# Patient Record
Sex: MALE | Race: WHITE | NOT HISPANIC OR LATINO
[De-identification: names, ages, dates, MRNs, and addresses within clinical notes are randomized per-mention and may not be internally consistent; named-entity substitution may affect disease eponyms.]

---

## 2017-01-05 ENCOUNTER — APPOINTMENT (OUTPATIENT)
Dept: FAMILY MEDICINE | Facility: CLINIC | Age: 82
End: 2017-01-05

## 2017-01-05 VITALS
BODY MASS INDEX: 31.84 KG/M2 | TEMPERATURE: 98 F | SYSTOLIC BLOOD PRESSURE: 124 MMHG | DIASTOLIC BLOOD PRESSURE: 60 MMHG | HEIGHT: 69 IN | WEIGHT: 215 LBS | HEART RATE: 72 BPM

## 2017-01-05 DIAGNOSIS — K52.9 NONINFECTIVE GASTROENTERITIS AND COLITIS, UNSPECIFIED: ICD-10-CM

## 2017-02-27 ENCOUNTER — APPOINTMENT (OUTPATIENT)
Dept: FAMILY MEDICINE | Facility: CLINIC | Age: 82
End: 2017-02-27

## 2017-02-27 ENCOUNTER — NON-APPOINTMENT (OUTPATIENT)
Age: 82
End: 2017-02-27

## 2017-02-27 VITALS — HEART RATE: 72 BPM | SYSTOLIC BLOOD PRESSURE: 140 MMHG | RESPIRATION RATE: 16 BRPM | DIASTOLIC BLOOD PRESSURE: 80 MMHG

## 2017-02-27 VITALS
SYSTOLIC BLOOD PRESSURE: 162 MMHG | OXYGEN SATURATION: 95 % | DIASTOLIC BLOOD PRESSURE: 80 MMHG | HEIGHT: 69 IN | HEART RATE: 95 BPM | WEIGHT: 210 LBS | BODY MASS INDEX: 31.1 KG/M2

## 2017-02-28 LAB
ALBUMIN SERPL ELPH-MCNC: 4.8 G/DL
ALP BLD-CCNC: 96 U/L
ALT SERPL-CCNC: 32 U/L
ANION GAP SERPL CALC-SCNC: 15 MMOL/L
APPEARANCE: CLEAR
AST SERPL-CCNC: 21 U/L
BACTERIA: NEGATIVE
BASOPHILS # BLD AUTO: 0.05 K/UL
BASOPHILS NFR BLD AUTO: 1.1 %
BILIRUB SERPL-MCNC: 0.5 MG/DL
BILIRUBIN URINE: NEGATIVE
BLOOD URINE: ABNORMAL
BUN SERPL-MCNC: 23 MG/DL
CALCIUM SERPL-MCNC: 10.4 MG/DL
CHLORIDE SERPL-SCNC: 101 MMOL/L
CHOLEST SERPL-MCNC: 206 MG/DL
CHOLEST/HDLC SERPL: 5 RATIO
CO2 SERPL-SCNC: 26 MMOL/L
COLOR: YELLOW
CREAT SERPL-MCNC: 1.16 MG/DL
CREAT SPEC-SCNC: 31 MG/DL
EOSINOPHIL # BLD AUTO: 0.18 K/UL
EOSINOPHIL NFR BLD AUTO: 3.9 %
GLUCOSE QUALITATIVE U: 100 MG/DL
GLUCOSE SERPL-MCNC: 167 MG/DL
HBA1C MFR BLD HPLC: 7.4 %
HCT VFR BLD CALC: 44.7 %
HDLC SERPL-MCNC: 41 MG/DL
HGB BLD-MCNC: 14.3 G/DL
HYALINE CASTS: 0 /LPF
IMM GRANULOCYTES NFR BLD AUTO: 0.2 %
KETONES URINE: NEGATIVE
LDLC SERPL CALC-MCNC: 104 MG/DL
LEUKOCYTE ESTERASE URINE: NEGATIVE
LYMPHOCYTES # BLD AUTO: 1.45 K/UL
LYMPHOCYTES NFR BLD AUTO: 31 %
MAN DIFF?: NORMAL
MCHC RBC-ENTMCNC: 27.9 PG
MCHC RBC-ENTMCNC: 32 GM/DL
MCV RBC AUTO: 87.1 FL
MICROALBUMIN 24H UR DL<=1MG/L-MCNC: 7.8 MG/DL
MICROALBUMIN/CREAT 24H UR-RTO: 253 UG/MG
MICROSCOPIC-UA: NORMAL
MONOCYTES # BLD AUTO: 0.58 K/UL
MONOCYTES NFR BLD AUTO: 12.4 %
NEUTROPHILS # BLD AUTO: 2.4 K/UL
NEUTROPHILS NFR BLD AUTO: 51.4 %
NITRITE URINE: NEGATIVE
PH URINE: 6
PLATELET # BLD AUTO: 196 K/UL
POTASSIUM SERPL-SCNC: 4.8 MMOL/L
PROT SERPL-MCNC: 8 G/DL
PROTEIN URINE: NEGATIVE MG/DL
RBC # BLD: 5.13 M/UL
RBC # FLD: 13.7 %
RED BLOOD CELLS URINE: 2 /HPF
SODIUM SERPL-SCNC: 142 MMOL/L
SPECIFIC GRAVITY URINE: 1.01
SQUAMOUS EPITHELIAL CELLS: 0 /HPF
T4 SERPL-MCNC: 6.6 UG/DL
TRIGL SERPL-MCNC: 306 MG/DL
TSH SERPL-ACNC: 0.76 UIU/ML
URATE SERPL-MCNC: 4.4 MG/DL
UROBILINOGEN URINE: NORMAL MG/DL
WBC # FLD AUTO: 4.67 K/UL
WHITE BLOOD CELLS URINE: 1 /HPF

## 2017-03-13 ENCOUNTER — APPOINTMENT (OUTPATIENT)
Dept: FAMILY MEDICINE | Facility: CLINIC | Age: 82
End: 2017-03-13

## 2017-03-13 VITALS
DIASTOLIC BLOOD PRESSURE: 60 MMHG | SYSTOLIC BLOOD PRESSURE: 108 MMHG | HEART RATE: 66 BPM | WEIGHT: 210.25 LBS | HEIGHT: 70 IN | BODY MASS INDEX: 30.1 KG/M2 | OXYGEN SATURATION: 93 %

## 2017-03-13 VITALS — RESPIRATION RATE: 16 BRPM | HEART RATE: 72 BPM | SYSTOLIC BLOOD PRESSURE: 120 MMHG | DIASTOLIC BLOOD PRESSURE: 80 MMHG

## 2017-03-13 DIAGNOSIS — M25.512 PAIN IN LEFT SHOULDER: ICD-10-CM

## 2017-03-13 LAB — INR PPP: 4.1 RATIO

## 2017-04-17 ENCOUNTER — APPOINTMENT (OUTPATIENT)
Dept: FAMILY MEDICINE | Facility: CLINIC | Age: 82
End: 2017-04-17

## 2017-04-17 VITALS
WEIGHT: 204 LBS | BODY MASS INDEX: 30.21 KG/M2 | SYSTOLIC BLOOD PRESSURE: 132 MMHG | TEMPERATURE: 97.6 F | HEIGHT: 69 IN | DIASTOLIC BLOOD PRESSURE: 60 MMHG

## 2017-04-17 LAB — INR PPP: 1.8 RATIO

## 2017-04-19 ENCOUNTER — TRANSCRIPTION ENCOUNTER (OUTPATIENT)
Age: 82
End: 2017-04-19

## 2017-04-19 LAB
ALBUMIN SERPL ELPH-MCNC: 4.1 G/DL
ALP BLD-CCNC: 308 U/L
ALT SERPL-CCNC: 280 U/L
ANION GAP SERPL CALC-SCNC: 18 MMOL/L
AST SERPL-CCNC: 76 U/L
BASOPHILS # BLD AUTO: 0.04 K/UL
BASOPHILS NFR BLD AUTO: 0.6 %
BILIRUB SERPL-MCNC: 2.1 MG/DL
BUN SERPL-MCNC: 24 MG/DL
CALCIUM SERPL-MCNC: 10.1 MG/DL
CHLORIDE SERPL-SCNC: 94 MMOL/L
CHOLEST SERPL-MCNC: 235 MG/DL
CHOLEST/HDLC SERPL: 10.7 RATIO
CO2 SERPL-SCNC: 23 MMOL/L
CREAT SERPL-MCNC: 0.96 MG/DL
EOSINOPHIL # BLD AUTO: 0.26 K/UL
EOSINOPHIL NFR BLD AUTO: 4.1 %
GLUCOSE SERPL-MCNC: 236 MG/DL
HAV IGG+IGM SER QL: NONREACTIVE
HBA1C MFR BLD HPLC: 7.2 %
HBV SURFACE AG SER QL: NONREACTIVE
HCT VFR BLD CALC: 41 %
HCV AB SER QL: NONREACTIVE
HCV S/CO RATIO: 0.16 S/CO
HDLC SERPL-MCNC: 22 MG/DL
HGB BLD-MCNC: 13.4 G/DL
IMM GRANULOCYTES NFR BLD AUTO: 1.4 %
LDLC SERPL CALC-MCNC: 164 MG/DL
LYMPHOCYTES # BLD AUTO: 1.13 K/UL
LYMPHOCYTES NFR BLD AUTO: 17.7 %
MAN DIFF?: NORMAL
MCHC RBC-ENTMCNC: 29.5 PG
MCHC RBC-ENTMCNC: 32.7 GM/DL
MCV RBC AUTO: 90.3 FL
MONOCYTES # BLD AUTO: 0.77 K/UL
MONOCYTES NFR BLD AUTO: 12.1 %
NEUTROPHILS # BLD AUTO: 4.08 K/UL
NEUTROPHILS NFR BLD AUTO: 64.1 %
PLATELET # BLD AUTO: 212 K/UL
POTASSIUM SERPL-SCNC: 4.6 MMOL/L
PROT SERPL-MCNC: 7.7 G/DL
RBC # BLD: 4.54 M/UL
RBC # FLD: 14.3 %
SODIUM SERPL-SCNC: 135 MMOL/L
T4 SERPL-MCNC: 8 UG/DL
TRIGL SERPL-MCNC: 244 MG/DL
TSH SERPL-ACNC: 1.72 UIU/ML
URATE SERPL-MCNC: 6.8 MG/DL
WBC # FLD AUTO: 6.37 K/UL

## 2017-04-20 LAB
HCV RNA FLD QL NAA+PROBE: NORMAL
HCV RNA SPEC QL PROBE+SIG AMP: NOT DETECTED

## 2017-04-27 ENCOUNTER — RX RENEWAL (OUTPATIENT)
Age: 82
End: 2017-04-27

## 2017-05-23 ENCOUNTER — APPOINTMENT (OUTPATIENT)
Dept: FAMILY MEDICINE | Facility: CLINIC | Age: 82
End: 2017-05-23

## 2017-05-23 VITALS
WEIGHT: 200 LBS | DIASTOLIC BLOOD PRESSURE: 62 MMHG | TEMPERATURE: 98.3 F | BODY MASS INDEX: 29.62 KG/M2 | HEIGHT: 69 IN | SYSTOLIC BLOOD PRESSURE: 114 MMHG

## 2017-05-23 LAB — INR PPP: 1 RATIO

## 2017-06-05 ENCOUNTER — APPOINTMENT (OUTPATIENT)
Dept: FAMILY MEDICINE | Facility: CLINIC | Age: 82
End: 2017-06-05

## 2017-06-15 ENCOUNTER — APPOINTMENT (OUTPATIENT)
Dept: FAMILY MEDICINE | Facility: CLINIC | Age: 82
End: 2017-06-15

## 2017-06-15 VITALS
RESPIRATION RATE: 16 BRPM | SYSTOLIC BLOOD PRESSURE: 112 MMHG | HEART RATE: 65 BPM | DIASTOLIC BLOOD PRESSURE: 62 MMHG | WEIGHT: 183.5 LBS | HEIGHT: 69 IN | OXYGEN SATURATION: 98 % | BODY MASS INDEX: 27.18 KG/M2 | TEMPERATURE: 97.9 F

## 2017-06-15 VITALS
BODY MASS INDEX: 27.18 KG/M2 | WEIGHT: 183.5 LBS | RESPIRATION RATE: 16 BRPM | OXYGEN SATURATION: 98 % | HEART RATE: 65 BPM | SYSTOLIC BLOOD PRESSURE: 112 MMHG | DIASTOLIC BLOOD PRESSURE: 62 MMHG | HEIGHT: 69 IN

## 2017-06-15 RX ORDER — WARFARIN 5 MG/1
5 TABLET ORAL
Qty: 90 | Refills: 0 | Status: COMPLETED | COMMUNITY
Start: 2017-01-05

## 2017-06-15 RX ORDER — HYDROCODONE BITARTRATE AND ACETAMINOPHEN 7.5; 325 MG/1; MG/1
7.5-325 TABLET ORAL
Qty: 12 | Refills: 0 | Status: COMPLETED | COMMUNITY
Start: 2017-05-17

## 2017-06-15 RX ORDER — TRIAMCINOLONE ACETONIDE 1 MG/G
0.1 CREAM TOPICAL
Qty: 60 | Refills: 0 | Status: COMPLETED | COMMUNITY
Start: 2017-01-13

## 2017-06-15 RX ORDER — AMOXICILLIN 875 MG/1
875 TABLET, FILM COATED ORAL
Qty: 14 | Refills: 0 | Status: COMPLETED | COMMUNITY
Start: 2017-05-23 | End: 2017-06-15

## 2017-06-15 RX ORDER — KETOCONAZOLE 20 MG/G
2 CREAM TOPICAL
Qty: 60 | Refills: 0 | Status: COMPLETED | COMMUNITY
Start: 2016-11-09

## 2017-06-15 RX ORDER — FLUTICASONE PROPIONATE 50 UG/1
50 SPRAY, METERED NASAL DAILY
Qty: 1 | Refills: 1 | Status: COMPLETED | COMMUNITY
Start: 2017-05-23 | End: 2017-06-15

## 2017-06-15 RX ORDER — WARFARIN 3 MG/1
3 TABLET ORAL
Qty: 90 | Refills: 0 | Status: COMPLETED | COMMUNITY
Start: 2017-02-04

## 2017-06-15 RX ORDER — CLINDAMYCIN HYDROCHLORIDE 300 MG/1
300 CAPSULE ORAL
Qty: 28 | Refills: 0 | Status: COMPLETED | COMMUNITY
Start: 2017-05-17

## 2017-06-15 RX ORDER — CEFPROZIL 500 MG/1
500 TABLET ORAL
Qty: 20 | Refills: 0 | Status: COMPLETED | COMMUNITY
Start: 2017-04-17 | End: 2017-06-15

## 2017-06-15 RX ORDER — L ACID,RHAMN/B.INFANTIS,LONGUM 2B CELL
100 CAPSULE, SPRINKLE ORAL
Qty: 30 | Refills: 0 | Status: COMPLETED | COMMUNITY
Start: 2017-06-06

## 2017-06-15 RX ORDER — CLOTRIMAZOLE AND BETAMETHASONE DIPROPIONATE 10; .5 MG/G; MG/G
1-0.05 CREAM TOPICAL
Qty: 45 | Refills: 0 | Status: COMPLETED | COMMUNITY
Start: 2016-12-23

## 2017-06-15 RX ORDER — WARFARIN 1 MG/1
1 TABLET ORAL
Qty: 90 | Refills: 0 | Status: COMPLETED | COMMUNITY
Start: 2017-02-06

## 2017-06-22 ENCOUNTER — APPOINTMENT (OUTPATIENT)
Dept: FAMILY MEDICINE | Facility: CLINIC | Age: 82
End: 2017-06-22

## 2017-06-22 LAB — INR PPP: 1.3 RATIO

## 2017-06-27 ENCOUNTER — RX RENEWAL (OUTPATIENT)
Age: 82
End: 2017-06-27

## 2017-07-13 ENCOUNTER — APPOINTMENT (OUTPATIENT)
Dept: FAMILY MEDICINE | Facility: CLINIC | Age: 82
End: 2017-07-13

## 2017-07-13 ENCOUNTER — RX RENEWAL (OUTPATIENT)
Age: 82
End: 2017-07-13

## 2017-07-13 LAB — INR PPP: 2.5 RATIO

## 2017-07-24 ENCOUNTER — APPOINTMENT (OUTPATIENT)
Dept: FAMILY MEDICINE | Facility: CLINIC | Age: 82
End: 2017-07-24

## 2017-07-24 LAB — INR PPP: 1.8 RATIO

## 2017-07-26 ENCOUNTER — APPOINTMENT (OUTPATIENT)
Dept: FAMILY MEDICINE | Facility: CLINIC | Age: 82
End: 2017-07-26

## 2017-09-09 ENCOUNTER — APPOINTMENT (OUTPATIENT)
Dept: FAMILY MEDICINE | Facility: CLINIC | Age: 82
End: 2017-09-09
Payer: MEDICARE

## 2017-09-09 LAB — INR PPP: 3.2 RATIO

## 2017-09-09 PROCEDURE — 85610 PROTHROMBIN TIME: CPT | Mod: QW

## 2017-09-09 PROCEDURE — 99212 OFFICE O/P EST SF 10 MIN: CPT | Mod: 25

## 2017-09-26 ENCOUNTER — APPOINTMENT (OUTPATIENT)
Dept: FAMILY MEDICINE | Facility: CLINIC | Age: 82
End: 2017-09-26
Payer: MEDICARE

## 2017-09-26 LAB — INR PPP: 3.9 RATIO

## 2017-09-26 PROCEDURE — 99212 OFFICE O/P EST SF 10 MIN: CPT | Mod: 25

## 2017-09-26 PROCEDURE — 85610 PROTHROMBIN TIME: CPT | Mod: QW

## 2017-10-02 ENCOUNTER — RX RENEWAL (OUTPATIENT)
Age: 82
End: 2017-10-02

## 2017-10-07 ENCOUNTER — APPOINTMENT (OUTPATIENT)
Dept: FAMILY MEDICINE | Facility: CLINIC | Age: 82
End: 2017-10-07
Payer: MEDICARE

## 2017-10-07 LAB — INR PPP: 1.8 RATIO

## 2017-10-07 PROCEDURE — G0008: CPT

## 2017-10-07 PROCEDURE — 99213 OFFICE O/P EST LOW 20 MIN: CPT | Mod: 25

## 2017-10-07 PROCEDURE — 90662 IIV NO PRSV INCREASED AG IM: CPT

## 2017-10-07 PROCEDURE — 85610 PROTHROMBIN TIME: CPT | Mod: QW

## 2017-10-17 ENCOUNTER — RX RENEWAL (OUTPATIENT)
Age: 82
End: 2017-10-17

## 2017-10-18 ENCOUNTER — RX RENEWAL (OUTPATIENT)
Age: 82
End: 2017-10-18

## 2017-10-21 ENCOUNTER — APPOINTMENT (OUTPATIENT)
Dept: FAMILY MEDICINE | Facility: CLINIC | Age: 82
End: 2017-10-21
Payer: MEDICARE

## 2017-10-21 VITALS
WEIGHT: 183 LBS | BODY MASS INDEX: 27.11 KG/M2 | HEART RATE: 71 BPM | RESPIRATION RATE: 16 BRPM | SYSTOLIC BLOOD PRESSURE: 110 MMHG | HEIGHT: 69 IN | OXYGEN SATURATION: 96 % | DIASTOLIC BLOOD PRESSURE: 50 MMHG

## 2017-10-21 DIAGNOSIS — S90.426A BLISTER (NONTHERMAL), UNSPECIFIED LESSER TOE(S), INITIAL ENCOUNTER: ICD-10-CM

## 2017-10-21 LAB — INR PPP: 2.7 RATIO

## 2017-10-21 PROCEDURE — 85610 PROTHROMBIN TIME: CPT | Mod: QW

## 2017-10-21 PROCEDURE — 99213 OFFICE O/P EST LOW 20 MIN: CPT | Mod: 25

## 2017-10-22 PROBLEM — S90.426A TOE BLISTER WITHOUT INFECTION: Status: ACTIVE | Noted: 2017-10-22

## 2017-10-24 ENCOUNTER — RX RENEWAL (OUTPATIENT)
Age: 82
End: 2017-10-24

## 2017-10-25 ENCOUNTER — APPOINTMENT (OUTPATIENT)
Dept: FAMILY MEDICINE | Facility: CLINIC | Age: 82
End: 2017-10-25

## 2017-10-30 ENCOUNTER — RX RENEWAL (OUTPATIENT)
Age: 82
End: 2017-10-30

## 2017-11-02 ENCOUNTER — RX RENEWAL (OUTPATIENT)
Age: 82
End: 2017-11-02

## 2017-11-08 ENCOUNTER — APPOINTMENT (OUTPATIENT)
Dept: FAMILY MEDICINE | Facility: CLINIC | Age: 82
End: 2017-11-08

## 2017-11-13 ENCOUNTER — APPOINTMENT (OUTPATIENT)
Dept: FAMILY MEDICINE | Facility: CLINIC | Age: 82
End: 2017-11-13
Payer: MEDICARE

## 2017-11-13 LAB — INR PPP: 2.2 RATIO

## 2017-11-13 PROCEDURE — 90670 PCV13 VACCINE IM: CPT

## 2017-11-13 PROCEDURE — 85610 PROTHROMBIN TIME: CPT | Mod: QW

## 2017-11-13 PROCEDURE — G0009: CPT

## 2017-11-13 PROCEDURE — 99212 OFFICE O/P EST SF 10 MIN: CPT | Mod: 25

## 2017-12-08 ENCOUNTER — RX RENEWAL (OUTPATIENT)
Age: 82
End: 2017-12-08

## 2017-12-18 ENCOUNTER — APPOINTMENT (OUTPATIENT)
Dept: FAMILY MEDICINE | Facility: CLINIC | Age: 82
End: 2017-12-18
Payer: MEDICARE

## 2017-12-18 LAB — INR PPP: 4.2 RATIO

## 2017-12-18 PROCEDURE — 85610 PROTHROMBIN TIME: CPT | Mod: QW

## 2017-12-18 PROCEDURE — 99212 OFFICE O/P EST SF 10 MIN: CPT | Mod: 25

## 2017-12-28 ENCOUNTER — APPOINTMENT (OUTPATIENT)
Dept: FAMILY MEDICINE | Facility: CLINIC | Age: 82
End: 2017-12-28

## 2017-12-31 ENCOUNTER — RX RENEWAL (OUTPATIENT)
Age: 82
End: 2017-12-31

## 2018-01-09 ENCOUNTER — FORM ENCOUNTER (OUTPATIENT)
Age: 83
End: 2018-01-09

## 2018-01-10 ENCOUNTER — APPOINTMENT (OUTPATIENT)
Dept: RADIOLOGY | Facility: CLINIC | Age: 83
End: 2018-01-10
Payer: MEDICARE

## 2018-01-10 ENCOUNTER — OUTPATIENT (OUTPATIENT)
Dept: OUTPATIENT SERVICES | Facility: HOSPITAL | Age: 83
LOS: 1 days | End: 2018-01-10
Payer: MEDICARE

## 2018-01-10 ENCOUNTER — APPOINTMENT (OUTPATIENT)
Dept: FAMILY MEDICINE | Facility: CLINIC | Age: 83
End: 2018-01-10
Payer: MEDICARE

## 2018-01-10 ENCOUNTER — APPOINTMENT (OUTPATIENT)
Dept: CT IMAGING | Facility: CLINIC | Age: 83
End: 2018-01-10
Payer: MEDICARE

## 2018-01-10 VITALS
HEIGHT: 69 IN | SYSTOLIC BLOOD PRESSURE: 104 MMHG | WEIGHT: 184 LBS | BODY MASS INDEX: 27.25 KG/M2 | DIASTOLIC BLOOD PRESSURE: 60 MMHG

## 2018-01-10 DIAGNOSIS — Z00.8 ENCOUNTER FOR OTHER GENERAL EXAMINATION: ICD-10-CM

## 2018-01-10 DIAGNOSIS — M25.511 PAIN IN RIGHT SHOULDER: ICD-10-CM

## 2018-01-10 DIAGNOSIS — M54.9 DORSALGIA, UNSPECIFIED: ICD-10-CM

## 2018-01-10 DIAGNOSIS — S22.39XA FRACTURE OF ONE RIB, UNSPECIFIED SIDE, INITIAL ENCOUNTER FOR CLOSED FRACTURE: Chronic | ICD-10-CM

## 2018-01-10 DIAGNOSIS — Z98.49 CATARACT EXTRACTION STATUS, UNSPECIFIED EYE: Chronic | ICD-10-CM

## 2018-01-10 DIAGNOSIS — Z95.1 PRESENCE OF AORTOCORONARY BYPASS GRAFT: Chronic | ICD-10-CM

## 2018-01-10 DIAGNOSIS — I48.91 UNSPECIFIED ATRIAL FIBRILLATION: ICD-10-CM

## 2018-01-10 DIAGNOSIS — Z98.89 OTHER SPECIFIED POSTPROCEDURAL STATES: Chronic | ICD-10-CM
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
## 2018-01-10 LAB — INR PPP: 4.1 RATIO

## 2018-01-10 PROCEDURE — 72070 X-RAY EXAM THORAC SPINE 2VWS: CPT | Mod: 26

## 2018-01-10 PROCEDURE — 70450 CT HEAD/BRAIN W/O DYE: CPT

## 2018-01-10 PROCEDURE — 70450 CT HEAD/BRAIN W/O DYE: CPT | Mod: 26

## 2018-01-10 PROCEDURE — 73030 X-RAY EXAM OF SHOULDER: CPT | Mod: 26,RT

## 2018-01-10 PROCEDURE — 99214 OFFICE O/P EST MOD 30 MIN: CPT

## 2018-01-10 PROCEDURE — 73030 X-RAY EXAM OF SHOULDER: CPT

## 2018-01-10 PROCEDURE — 72070 X-RAY EXAM THORAC SPINE 2VWS: CPT

## 2018-01-12 ENCOUNTER — RX RENEWAL (OUTPATIENT)
Age: 83
End: 2018-01-12

## 2018-01-13 ENCOUNTER — APPOINTMENT (OUTPATIENT)
Dept: FAMILY MEDICINE | Facility: CLINIC | Age: 83
End: 2018-01-13
Payer: MEDICARE

## 2018-01-13 LAB — INR PPP: 1.5 RATIO

## 2018-01-13 PROCEDURE — 85610 PROTHROMBIN TIME: CPT | Mod: QW

## 2018-01-13 PROCEDURE — 99212 OFFICE O/P EST SF 10 MIN: CPT | Mod: 25

## 2018-01-22 ENCOUNTER — APPOINTMENT (OUTPATIENT)
Dept: FAMILY MEDICINE | Facility: CLINIC | Age: 83
End: 2018-01-22
Payer: MEDICARE

## 2018-01-22 LAB — INR PPP: 2.1 RATIO

## 2018-01-22 PROCEDURE — 99212 OFFICE O/P EST SF 10 MIN: CPT | Mod: 25

## 2018-01-22 PROCEDURE — 85610 PROTHROMBIN TIME: CPT | Mod: QW

## 2018-02-28 ENCOUNTER — APPOINTMENT (OUTPATIENT)
Dept: CARDIOLOGY | Facility: CLINIC | Age: 83
End: 2018-02-28
Payer: MEDICARE

## 2018-02-28 ENCOUNTER — NON-APPOINTMENT (OUTPATIENT)
Age: 83
End: 2018-02-28

## 2018-02-28 ENCOUNTER — APPOINTMENT (OUTPATIENT)
Dept: FAMILY MEDICINE | Facility: CLINIC | Age: 83
End: 2018-02-28
Payer: MEDICARE

## 2018-02-28 VITALS
DIASTOLIC BLOOD PRESSURE: 62 MMHG | BODY MASS INDEX: 28.88 KG/M2 | SYSTOLIC BLOOD PRESSURE: 97 MMHG | HEIGHT: 69 IN | OXYGEN SATURATION: 94 % | WEIGHT: 195 LBS | HEART RATE: 65 BPM

## 2018-02-28 VITALS — HEART RATE: 72 BPM | RESPIRATION RATE: 16 BRPM | DIASTOLIC BLOOD PRESSURE: 80 MMHG | SYSTOLIC BLOOD PRESSURE: 110 MMHG

## 2018-02-28 VITALS
SYSTOLIC BLOOD PRESSURE: 110 MMHG | HEIGHT: 69 IN | DIASTOLIC BLOOD PRESSURE: 60 MMHG | BODY MASS INDEX: 28.88 KG/M2 | WEIGHT: 195 LBS

## 2018-02-28 LAB — INR PPP: 2.5 RATIO

## 2018-02-28 PROCEDURE — 99214 OFFICE O/P EST MOD 30 MIN: CPT

## 2018-02-28 PROCEDURE — 85610 PROTHROMBIN TIME: CPT | Mod: QW

## 2018-02-28 PROCEDURE — 36415 COLL VENOUS BLD VENIPUNCTURE: CPT

## 2018-02-28 PROCEDURE — G0439: CPT

## 2018-02-28 PROCEDURE — 93000 ELECTROCARDIOGRAM COMPLETE: CPT | Mod: 59

## 2018-03-01 ENCOUNTER — TRANSCRIPTION ENCOUNTER (OUTPATIENT)
Age: 83
End: 2018-03-01

## 2018-03-01 LAB
ALBUMIN SERPL ELPH-MCNC: 4.5 G/DL
ALP BLD-CCNC: 72 U/L
ALT SERPL-CCNC: 23 U/L
ANION GAP SERPL CALC-SCNC: 12 MMOL/L
APPEARANCE: CLEAR
AST SERPL-CCNC: 28 U/L
BACTERIA: NEGATIVE
BASOPHILS # BLD AUTO: 0.04 K/UL
BASOPHILS NFR BLD AUTO: 0.8 %
BILIRUB SERPL-MCNC: 0.7 MG/DL
BILIRUBIN URINE: NEGATIVE
BLOOD URINE: NEGATIVE
BUN SERPL-MCNC: 24 MG/DL
CALCIUM SERPL-MCNC: 10.5 MG/DL
CHLORIDE SERPL-SCNC: 101 MMOL/L
CHOLEST SERPL-MCNC: 191 MG/DL
CHOLEST/HDLC SERPL: 3.7 RATIO
CO2 SERPL-SCNC: 24 MMOL/L
COLOR: YELLOW
CREAT SERPL-MCNC: 0.97 MG/DL
CREAT SPEC-SCNC: 58 MG/DL
EOSINOPHIL # BLD AUTO: 0.21 K/UL
EOSINOPHIL NFR BLD AUTO: 4.3 %
GLUCOSE QUALITATIVE U: NEGATIVE MG/DL
GLUCOSE SERPL-MCNC: 135 MG/DL
HBA1C MFR BLD HPLC: 5.9 %
HCT VFR BLD CALC: 46.4 %
HDLC SERPL-MCNC: 51 MG/DL
HGB BLD-MCNC: 15.6 G/DL
IMM GRANULOCYTES NFR BLD AUTO: 0.2 %
KETONES URINE: NEGATIVE
LDLC SERPL CALC-MCNC: 115 MG/DL
LEUKOCYTE ESTERASE URINE: NEGATIVE
LYMPHOCYTES # BLD AUTO: 1.6 K/UL
LYMPHOCYTES NFR BLD AUTO: 32.5 %
MAN DIFF?: NORMAL
MCHC RBC-ENTMCNC: 29.8 PG
MCHC RBC-ENTMCNC: 33.6 GM/DL
MCV RBC AUTO: 88.7 FL
MICROALBUMIN 24H UR DL<=1MG/L-MCNC: 0.6 MG/DL
MICROALBUMIN/CREAT 24H UR-RTO: 10 MG/G
MICROSCOPIC-UA: NORMAL
MONOCYTES # BLD AUTO: 0.66 K/UL
MONOCYTES NFR BLD AUTO: 13.4 %
NEUTROPHILS # BLD AUTO: 2.41 K/UL
NEUTROPHILS NFR BLD AUTO: 48.8 %
NITRITE URINE: NEGATIVE
PH URINE: 6.5
PLATELET # BLD AUTO: 175 K/UL
POTASSIUM SERPL-SCNC: 5 MMOL/L
PROT SERPL-MCNC: 7.8 G/DL
PROTEIN URINE: NEGATIVE MG/DL
RBC # BLD: 5.23 M/UL
RBC # FLD: 14.1 %
RED BLOOD CELLS URINE: 1 /HPF
SODIUM SERPL-SCNC: 137 MMOL/L
SPECIFIC GRAVITY URINE: 1.01
SQUAMOUS EPITHELIAL CELLS: 0 /HPF
T4 SERPL-MCNC: 6.3 UG/DL
TRIGL SERPL-MCNC: 125 MG/DL
TSH SERPL-ACNC: 1.44 UIU/ML
URATE SERPL-MCNC: 5 MG/DL
UROBILINOGEN URINE: NEGATIVE MG/DL
WBC # FLD AUTO: 4.93 K/UL
WHITE BLOOD CELLS URINE: 0 /HPF

## 2018-03-12 ENCOUNTER — APPOINTMENT (OUTPATIENT)
Dept: CARDIOLOGY | Facility: CLINIC | Age: 83
End: 2018-03-12
Payer: MEDICARE

## 2018-03-12 PROCEDURE — 93306 TTE W/DOPPLER COMPLETE: CPT

## 2018-03-12 RX ADMIN — PERFLUTREN MG/ML: 6.52 INJECTION, SUSPENSION INTRAVENOUS at 00:00

## 2018-03-14 RX ORDER — PERFLUTREN 6.52 MG/ML
6.52 INJECTION, SUSPENSION INTRAVENOUS
Qty: 1 | Refills: 0 | Status: COMPLETED | OUTPATIENT
Start: 2018-03-12

## 2018-03-19 ENCOUNTER — APPOINTMENT (OUTPATIENT)
Dept: ELECTROPHYSIOLOGY | Facility: CLINIC | Age: 83
End: 2018-03-19
Payer: MEDICARE

## 2018-03-19 VITALS
SYSTOLIC BLOOD PRESSURE: 128 MMHG | BODY MASS INDEX: 28.88 KG/M2 | HEART RATE: 69 BPM | OXYGEN SATURATION: 96 % | WEIGHT: 195 LBS | DIASTOLIC BLOOD PRESSURE: 75 MMHG | HEIGHT: 69 IN

## 2018-03-19 PROCEDURE — 93283 PRGRMG EVAL IMPLANTABLE DFB: CPT

## 2018-03-19 PROCEDURE — 99205 OFFICE O/P NEW HI 60 MIN: CPT | Mod: 25

## 2018-03-21 ENCOUNTER — OUTPATIENT (OUTPATIENT)
Dept: OUTPATIENT SERVICES | Facility: HOSPITAL | Age: 83
LOS: 1 days | End: 2018-03-21
Payer: MEDICARE

## 2018-03-21 DIAGNOSIS — I25.10 ATHEROSCLEROTIC HEART DISEASE OF NATIVE CORONARY ARTERY WITHOUT ANGINA PECTORIS: ICD-10-CM

## 2018-03-21 DIAGNOSIS — Z98.89 OTHER SPECIFIED POSTPROCEDURAL STATES: Chronic | ICD-10-CM

## 2018-03-21 DIAGNOSIS — S22.39XA FRACTURE OF ONE RIB, UNSPECIFIED SIDE, INITIAL ENCOUNTER FOR CLOSED FRACTURE: Chronic | ICD-10-CM

## 2018-03-21 DIAGNOSIS — Z98.49 CATARACT EXTRACTION STATUS, UNSPECIFIED EYE: Chronic | ICD-10-CM

## 2018-03-21 DIAGNOSIS — Z95.1 PRESENCE OF AORTOCORONARY BYPASS GRAFT: Chronic | ICD-10-CM

## 2018-03-21 PROCEDURE — 78452 HT MUSCLE IMAGE SPECT MULT: CPT | Mod: 26

## 2018-03-21 PROCEDURE — A9500: CPT

## 2018-03-21 PROCEDURE — 78452 HT MUSCLE IMAGE SPECT MULT: CPT

## 2018-03-21 PROCEDURE — 93017 CV STRESS TEST TRACING ONLY: CPT

## 2018-03-21 PROCEDURE — 93016 CV STRESS TEST SUPVJ ONLY: CPT

## 2018-03-21 PROCEDURE — 93018 CV STRESS TEST I&R ONLY: CPT

## 2018-03-29 ENCOUNTER — RX RENEWAL (OUTPATIENT)
Age: 83
End: 2018-03-29

## 2018-03-29 ENCOUNTER — OTHER (OUTPATIENT)
Age: 83
End: 2018-03-29

## 2018-03-30 ENCOUNTER — RX RENEWAL (OUTPATIENT)
Age: 83
End: 2018-03-30

## 2018-04-04 ENCOUNTER — RX RENEWAL (OUTPATIENT)
Age: 83
End: 2018-04-04

## 2018-04-11 ENCOUNTER — APPOINTMENT (OUTPATIENT)
Dept: FAMILY MEDICINE | Facility: CLINIC | Age: 83
End: 2018-04-11
Payer: MEDICARE

## 2018-04-11 LAB — INR PPP: 2.1 RATIO

## 2018-04-11 PROCEDURE — 99212 OFFICE O/P EST SF 10 MIN: CPT | Mod: 25

## 2018-04-11 PROCEDURE — 85610 PROTHROMBIN TIME: CPT | Mod: QW

## 2018-04-19 ENCOUNTER — RX RENEWAL (OUTPATIENT)
Age: 83
End: 2018-04-19

## 2018-04-23 ENCOUNTER — RX RENEWAL (OUTPATIENT)
Age: 83
End: 2018-04-23

## 2018-05-14 ENCOUNTER — RX RENEWAL (OUTPATIENT)
Age: 83
End: 2018-05-14

## 2018-05-15 ENCOUNTER — RX RENEWAL (OUTPATIENT)
Age: 83
End: 2018-05-15

## 2018-05-23 ENCOUNTER — RX RENEWAL (OUTPATIENT)
Age: 83
End: 2018-05-23

## 2018-05-24 ENCOUNTER — RX RENEWAL (OUTPATIENT)
Age: 83
End: 2018-05-24

## 2018-05-29 ENCOUNTER — RX RENEWAL (OUTPATIENT)
Age: 83
End: 2018-05-29

## 2018-06-07 ENCOUNTER — RX RENEWAL (OUTPATIENT)
Age: 83
End: 2018-06-07

## 2018-06-13 ENCOUNTER — RX RENEWAL (OUTPATIENT)
Age: 83
End: 2018-06-13

## 2018-06-26 ENCOUNTER — RX RENEWAL (OUTPATIENT)
Age: 83
End: 2018-06-26

## 2018-07-02 ENCOUNTER — RX RENEWAL (OUTPATIENT)
Age: 83
End: 2018-07-02

## 2018-07-05 ENCOUNTER — RX RENEWAL (OUTPATIENT)
Age: 83
End: 2018-07-05

## 2018-07-08 RX ORDER — LANCETS
EACH MISCELLANEOUS
Qty: 90 | Refills: 1 | Status: ACTIVE | COMMUNITY
Start: 2018-07-05 | End: 1900-01-01

## 2018-07-18 ENCOUNTER — RX RENEWAL (OUTPATIENT)
Age: 83
End: 2018-07-18

## 2018-07-23 ENCOUNTER — RX RENEWAL (OUTPATIENT)
Age: 83
End: 2018-07-23

## 2018-07-25 ENCOUNTER — APPOINTMENT (OUTPATIENT)
Dept: FAMILY MEDICINE | Facility: CLINIC | Age: 83
End: 2018-07-25
Payer: MEDICARE

## 2018-07-25 LAB — INR PPP: 2.9 RATIO

## 2018-07-25 PROCEDURE — 99212 OFFICE O/P EST SF 10 MIN: CPT | Mod: 25

## 2018-07-25 PROCEDURE — 85610 PROTHROMBIN TIME: CPT | Mod: QW

## 2018-08-15 ENCOUNTER — RX RENEWAL (OUTPATIENT)
Age: 83
End: 2018-08-15

## 2018-09-05 ENCOUNTER — APPOINTMENT (OUTPATIENT)
Dept: FAMILY MEDICINE | Facility: CLINIC | Age: 83
End: 2018-09-05
Payer: MEDICARE

## 2018-09-05 LAB — INR PPP: 2.6 RATIO

## 2018-09-05 PROCEDURE — 99212 OFFICE O/P EST SF 10 MIN: CPT | Mod: 25

## 2018-09-05 PROCEDURE — 85610 PROTHROMBIN TIME: CPT | Mod: QW

## 2018-09-23 ENCOUNTER — RX RENEWAL (OUTPATIENT)
Age: 83
End: 2018-09-23

## 2018-09-25 ENCOUNTER — RX RENEWAL (OUTPATIENT)
Age: 83
End: 2018-09-25

## 2018-10-09 ENCOUNTER — APPOINTMENT (OUTPATIENT)
Dept: FAMILY MEDICINE | Facility: CLINIC | Age: 83
End: 2018-10-09
Payer: MEDICARE

## 2018-10-09 DIAGNOSIS — Z23 ENCOUNTER FOR IMMUNIZATION: ICD-10-CM

## 2018-10-09 LAB — INR PPP: 2.1 RATIO

## 2018-10-09 PROCEDURE — G0008: CPT

## 2018-10-09 PROCEDURE — 90662 IIV NO PRSV INCREASED AG IM: CPT

## 2018-10-09 PROCEDURE — 85610 PROTHROMBIN TIME: CPT | Mod: QW

## 2018-10-09 PROCEDURE — 99212 OFFICE O/P EST SF 10 MIN: CPT | Mod: 25

## 2018-10-13 ENCOUNTER — RX RENEWAL (OUTPATIENT)
Age: 83
End: 2018-10-13

## 2018-10-15 ENCOUNTER — RX RENEWAL (OUTPATIENT)
Age: 83
End: 2018-10-15

## 2018-10-26 ENCOUNTER — RX RENEWAL (OUTPATIENT)
Age: 83
End: 2018-10-26

## 2018-10-28 ENCOUNTER — RX RENEWAL (OUTPATIENT)
Age: 83
End: 2018-10-28

## 2018-11-12 ENCOUNTER — APPOINTMENT (OUTPATIENT)
Dept: FAMILY MEDICINE | Facility: CLINIC | Age: 83
End: 2018-11-12
Payer: MEDICARE

## 2018-11-12 VITALS — RESPIRATION RATE: 16 BRPM | DIASTOLIC BLOOD PRESSURE: 80 MMHG | HEART RATE: 72 BPM | SYSTOLIC BLOOD PRESSURE: 110 MMHG

## 2018-11-12 VITALS
HEIGHT: 69 IN | SYSTOLIC BLOOD PRESSURE: 122 MMHG | DIASTOLIC BLOOD PRESSURE: 62 MMHG | HEART RATE: 96 BPM | OXYGEN SATURATION: 97 % | TEMPERATURE: 97.4 F | WEIGHT: 197 LBS | BODY MASS INDEX: 29.18 KG/M2

## 2018-11-12 DIAGNOSIS — Z86.39 PERSONAL HISTORY OF OTHER ENDOCRINE, NUTRITIONAL AND METABOLIC DISEASE: ICD-10-CM

## 2018-11-12 DIAGNOSIS — L28.0 LICHEN SIMPLEX CHRONICUS: ICD-10-CM

## 2018-11-12 DIAGNOSIS — Z87.448 PERSONAL HISTORY OF OTHER DISEASES OF URINARY SYSTEM: ICD-10-CM

## 2018-11-12 DIAGNOSIS — Z86.79 PERSONAL HISTORY OF OTHER DISEASES OF THE CIRCULATORY SYSTEM: ICD-10-CM

## 2018-11-12 DIAGNOSIS — M79.643 PAIN IN UNSPECIFIED HAND: ICD-10-CM

## 2018-11-12 PROCEDURE — 85610 PROTHROMBIN TIME: CPT | Mod: QW

## 2018-11-12 PROCEDURE — 99215 OFFICE O/P EST HI 40 MIN: CPT | Mod: 25

## 2018-11-12 RX ORDER — WARFARIN 1 MG/1
1 TABLET ORAL DAILY
Qty: 90 | Refills: 3 | Status: COMPLETED | COMMUNITY
Start: 2017-06-22 | End: 2018-11-12

## 2018-11-12 NOTE — PHYSICAL EXAM
[No Acute Distress] : no acute distress [Well Nourished] : well nourished [Well Developed] : well developed [Well-Appearing] : well-appearing [Normal Sclera/Conjunctiva] : normal sclera/conjunctiva [PERRL] : pupils equal round and reactive to light [EOMI] : extraocular movements intact [Normal Outer Ear/Nose] : the outer ears and nose were normal in appearance [Normal Oropharynx] : the oropharynx was normal [No JVD] : no jugular venous distention [Supple] : supple [No Lymphadenopathy] : no lymphadenopathy [Thyroid Normal, No Nodules] : the thyroid was normal and there were no nodules present [No Respiratory Distress] : no respiratory distress  [Clear to Auscultation] : lungs were clear to auscultation bilaterally [No Accessory Muscle Use] : no accessory muscle use [Normal Rate] : normal rate  [Regular Rhythm] : with a regular rhythm [Normal S1, S2] : normal S1 and S2 [No Murmur] : no murmur heard [No Carotid Bruits] : no carotid bruits [No Edema] : there was no peripheral edema [Soft] : abdomen soft [Non Tender] : non-tender [Non-distended] : non-distended [No Masses] : no abdominal mass palpated [No HSM] : no HSM [Normal Bowel Sounds] : normal bowel sounds [Normal Posterior Cervical Nodes] : no posterior cervical lymphadenopathy [Normal Anterior Cervical Nodes] : no anterior cervical lymphadenopathy [No CVA Tenderness] : no CVA  tenderness [No Spinal Tenderness] : no spinal tenderness [No Joint Swelling] : no joint swelling [Grossly Normal Strength/Tone] : grossly normal strength/tone [No Rash] : no rash [Normal Gait] : normal gait [Coordination Grossly Intact] : coordination grossly intact [No Focal Deficits] : no focal deficits [Deep Tendon Reflexes (DTR)] : deep tendon reflexes were 2+ and symmetric [Normal Affect] : the affect was normal [Normal Insight/Judgement] : insight and judgment were intact [Right Foot Was Examined] : Right foot ~C was examined [Left Foot Was Examined] : left foot ~C was examined [None] : no ulcers in either foot were found [] : left foot [de-identified] : trigger  finger  right 2nd  digit

## 2018-11-12 NOTE — ASSESSMENT
[FreeTextEntry1] : had  gist  tumor  2015  last pet  scan was 2017 will  order  pet  scan f/u labs  done  for  dm inr 1.7 same dose do not take  any supplements unless  checked eat consistent diet\par  tramadol  for hand pain and betamethazone for neuro derm of  rash

## 2018-11-12 NOTE — REVIEW OF SYSTEMS
[Fatigue] : no fatigue [Chest Pain] : no chest pain [Shortness Of Breath] : no shortness of breath [Dyspnea on Exertion] : not dyspnea on exertion [Abdominal Pain] : no abdominal pain [Constipation] : no constipation [Diarrhea] : no diarrhea [Easy Bleeding] : no easy bleeding [Swollen Glands] : no swollen glands

## 2018-11-12 NOTE — HISTORY OF PRESENT ILLNESS
[Coronary Artery Disease] : Coronary Artery Disease [Hyperlipidemia] : Hyperlipidemia [Hypertension] : Hypertension [Other: ___] : [unfilled] [Check glucose ___ x/day] : Patient checks  blood glucose [unfilled]  times a day [Fasting:  ___] : Fasting Blood Sugar: [unfilled] mg/dL [Most Recent A1C: ___] : Most recent A1C was [unfilled] [Does not check BP] : The patient is not checking blood pressure [<130/90] : Target blood pressure is  <130/90 [Target goal met] : BP target goal met [Doing Well] : Patient is doing well [Low Intensity Therapy] : Patient is currently on low intensity statin  therapy [Systolic] : systolic [Stable] : The condition is stable [FreeTextEntry6] : c/o  bilateral and hand pain and  finger locking  has  itching right post calf  [Symptoms Limit Activities] : Symptoms do not limit ~his/her~ activities

## 2018-11-19 ENCOUNTER — APPOINTMENT (OUTPATIENT)
Dept: CARDIOLOGY | Facility: CLINIC | Age: 83
End: 2018-11-19
Payer: MEDICARE

## 2018-11-19 ENCOUNTER — NON-APPOINTMENT (OUTPATIENT)
Age: 83
End: 2018-11-19

## 2018-11-19 VITALS
HEART RATE: 71 BPM | OXYGEN SATURATION: 96 % | HEIGHT: 69 IN | BODY MASS INDEX: 29.18 KG/M2 | WEIGHT: 197 LBS | DIASTOLIC BLOOD PRESSURE: 67 MMHG | SYSTOLIC BLOOD PRESSURE: 123 MMHG

## 2018-11-19 PROCEDURE — 93000 ELECTROCARDIOGRAM COMPLETE: CPT

## 2018-11-19 PROCEDURE — 99214 OFFICE O/P EST MOD 30 MIN: CPT | Mod: 25

## 2018-11-19 NOTE — REASON FOR VISIT
[Follow-Up - Clinic] : a clinic follow-up of [Cardiomyopathy] : cardiomyopathy [Coronary Artery Disease] : coronary artery disease

## 2018-11-25 ENCOUNTER — RX RENEWAL (OUTPATIENT)
Age: 83
End: 2018-11-25

## 2018-11-28 ENCOUNTER — APPOINTMENT (OUTPATIENT)
Dept: FAMILY MEDICINE | Facility: CLINIC | Age: 83
End: 2018-11-28
Payer: MEDICARE

## 2018-11-28 LAB — INR PPP: 1.5 RATIO

## 2018-11-28 PROCEDURE — 85610 PROTHROMBIN TIME: CPT | Mod: QW

## 2018-11-28 PROCEDURE — 99212 OFFICE O/P EST SF 10 MIN: CPT | Mod: 25

## 2018-12-03 ENCOUNTER — NON-APPOINTMENT (OUTPATIENT)
Age: 83
End: 2018-12-03

## 2018-12-03 NOTE — REVIEW OF SYSTEMS
[Eyeglasses] : currently wearing eyeglasses [Negative] : Heme/Lymph [Recent Weight Loss (___ Lbs)] : no recent weight loss [Blurry Vision] : no blurred vision [see HPI] : see HPI [Joint Pain] : joint pain [Muscle Cramps] : no muscle cramps

## 2018-12-03 NOTE — PHYSICAL EXAM
[General Appearance - Well Developed] : well developed [Normal Appearance] : normal appearance [Well Groomed] : well groomed [General Appearance - Well Nourished] : well nourished [General Appearance - In No Acute Distress] : no acute distress [Normal Conjunctiva] : the conjunctiva exhibited no abnormalities [No Oral Pallor] : no oral pallor [No Oral Cyanosis] : no oral cyanosis [Normal Jugular Venous V Waves Present] : normal jugular venous V waves present [No Jugular Venous Cook A Waves] : no jugular venous cook A waves [Respiration, Rhythm And Depth] : normal respiratory rhythm and effort [Exaggerated Use Of Accessory Muscles For Inspiration] : no accessory muscle use [Auscultation Breath Sounds / Voice Sounds] : lungs were clear to auscultation bilaterally [Heart Rate And Rhythm] : heart rate and rhythm were normal [Heart Sounds] : normal S1 and S2 [Murmurs] : no murmurs present [Arterial Pulses Normal] : the arterial pulses were normal [Edema] : no peripheral edema present [Abdomen Soft] : soft [Abdomen Tenderness] : non-tender [Abnormal Walk] : normal gait [Nail Clubbing] : no clubbing of the fingernails [Cyanosis, Localized] : no localized cyanosis [Skin Turgor] : normal skin turgor [] : no rash [Oriented To Time, Place, And Person] : oriented to person, place, and time [Impaired Insight] : insight and judgment were intact [Affect] : the affect was normal [Memory Recent] : recent memory was not impaired [FreeTextEntry1] : no JVD or bruits

## 2018-12-12 ENCOUNTER — APPOINTMENT (OUTPATIENT)
Dept: FAMILY MEDICINE | Facility: CLINIC | Age: 83
End: 2018-12-12
Payer: MEDICARE

## 2018-12-12 DIAGNOSIS — M65.321 TRIGGER FINGER, RIGHT INDEX FINGER: ICD-10-CM

## 2018-12-12 DIAGNOSIS — H91.90 UNSPECIFIED HEARING LOSS, UNSPECIFIED EAR: ICD-10-CM

## 2018-12-12 LAB
BASOPHILS # BLD AUTO: 0.05 K/UL
BASOPHILS NFR BLD AUTO: 0.8 %
EOSINOPHIL # BLD AUTO: 0.29 K/UL
EOSINOPHIL NFR BLD AUTO: 4.7 %
HCT VFR BLD CALC: 46.4 %
HGB BLD-MCNC: 15.3 G/DL
IMM GRANULOCYTES NFR BLD AUTO: 0.3 %
INR PPP: 2.5 RATIO
LYMPHOCYTES # BLD AUTO: 1.64 K/UL
LYMPHOCYTES NFR BLD AUTO: 26.7 %
MAN DIFF?: NORMAL
MCHC RBC-ENTMCNC: 29.4 PG
MCHC RBC-ENTMCNC: 33 GM/DL
MCV RBC AUTO: 89.2 FL
MONOCYTES # BLD AUTO: 0.58 K/UL
MONOCYTES NFR BLD AUTO: 9.4 %
NEUTROPHILS # BLD AUTO: 3.56 K/UL
NEUTROPHILS NFR BLD AUTO: 58.1 %
PLATELET # BLD AUTO: 177 K/UL
RBC # BLD: 5.2 M/UL
RBC # FLD: 13.8 %
WBC # FLD AUTO: 6.14 K/UL

## 2018-12-12 PROCEDURE — 20605 DRAIN/INJ JOINT/BURSA W/O US: CPT

## 2018-12-12 PROCEDURE — 99214 OFFICE O/P EST MOD 30 MIN: CPT | Mod: 25

## 2018-12-12 RX ORDER — METHYLPRED ACET/NACL,ISO-OS/PF 40 MG/ML
40 VIAL (ML) INJECTION
Qty: 1 | Refills: 0 | Status: COMPLETED | OUTPATIENT
Start: 2018-12-12

## 2018-12-12 RX ADMIN — METHYLPREDNISOLONE ACETATE 40 MG/ML: 40 INJECTION, SUSPENSION INTRA-ARTICULAR; INTRALESIONAL; INTRAMUSCULAR; SOFT TISSUE at 00:00

## 2018-12-12 NOTE — DATA REVIEWED
[FreeTextEntry1] : inr  2.5  same dose do not take  any supplements unless  checked eat consistent diet\par

## 2018-12-12 NOTE — PHYSICAL EXAM
[de-identified] : eac clear minimal wax  [de-identified] : trigger  finger right 2nd  finger injected with lidocaine and medrol under local

## 2018-12-13 LAB
ALBUMIN SERPL ELPH-MCNC: 5 G/DL
ALP BLD-CCNC: 68 U/L
ALT SERPL-CCNC: 23 U/L
ANION GAP SERPL CALC-SCNC: 13 MMOL/L
APPEARANCE: CLEAR
AST SERPL-CCNC: 28 U/L
BACTERIA: NEGATIVE
BILIRUB SERPL-MCNC: 0.5 MG/DL
BILIRUBIN URINE: NEGATIVE
BLOOD URINE: NEGATIVE
BUN SERPL-MCNC: 35 MG/DL
CALCIUM SERPL-MCNC: 10.9 MG/DL
CHLORIDE SERPL-SCNC: 105 MMOL/L
CHOLEST SERPL-MCNC: 182 MG/DL
CHOLEST/HDLC SERPL: 4 RATIO
CO2 SERPL-SCNC: 23 MMOL/L
COLOR: YELLOW
CREAT SERPL-MCNC: 1.37 MG/DL
CREAT SPEC-SCNC: 73 MG/DL
GLUCOSE QUALITATIVE U: NEGATIVE MG/DL
GLUCOSE SERPL-MCNC: 121 MG/DL
HBA1C MFR BLD HPLC: 5.6 %
HDLC SERPL-MCNC: 46 MG/DL
HYALINE CASTS: 1 /LPF
KETONES URINE: NEGATIVE
LDLC SERPL CALC-MCNC: 110 MG/DL
LEUKOCYTE ESTERASE URINE: ABNORMAL
MICROALBUMIN 24H UR DL<=1MG/L-MCNC: 3.7 MG/DL
MICROALBUMIN/CREAT 24H UR-RTO: 51 MG/G
MICROSCOPIC-UA: NORMAL
NITRITE URINE: NEGATIVE
PH URINE: 5.5
POTASSIUM SERPL-SCNC: 5.4 MMOL/L
PROT SERPL-MCNC: 7.9 G/DL
PROTEIN URINE: NEGATIVE MG/DL
RED BLOOD CELLS URINE: 2 /HPF
SODIUM SERPL-SCNC: 141 MMOL/L
SPECIFIC GRAVITY URINE: 1.02
SQUAMOUS EPITHELIAL CELLS: 1 /HPF
T4 SERPL-MCNC: 6.6 UG/DL
TRIGL SERPL-MCNC: 131 MG/DL
TSH SERPL-ACNC: 1.37 UIU/ML
URATE SERPL-MCNC: 5.5 MG/DL
UROBILINOGEN URINE: NEGATIVE MG/DL
WHITE BLOOD CELLS URINE: 5 /HPF

## 2018-12-18 ENCOUNTER — RX RENEWAL (OUTPATIENT)
Age: 83
End: 2018-12-18

## 2018-12-22 ENCOUNTER — RX RENEWAL (OUTPATIENT)
Age: 83
End: 2018-12-22

## 2019-01-10 ENCOUNTER — RX RENEWAL (OUTPATIENT)
Age: 84
End: 2019-01-10

## 2019-01-24 ENCOUNTER — RX RENEWAL (OUTPATIENT)
Age: 84
End: 2019-01-24

## 2019-01-24 ENCOUNTER — APPOINTMENT (OUTPATIENT)
Dept: FAMILY MEDICINE | Facility: CLINIC | Age: 84
End: 2019-01-24
Payer: MEDICARE

## 2019-01-24 PROCEDURE — 85610 PROTHROMBIN TIME: CPT | Mod: QW

## 2019-01-24 PROCEDURE — 99212 OFFICE O/P EST SF 10 MIN: CPT | Mod: 25

## 2019-02-21 ENCOUNTER — RX RENEWAL (OUTPATIENT)
Age: 84
End: 2019-02-21

## 2019-02-27 ENCOUNTER — RX RENEWAL (OUTPATIENT)
Age: 84
End: 2019-02-27

## 2019-03-01 ENCOUNTER — RX RENEWAL (OUTPATIENT)
Age: 84
End: 2019-03-01

## 2019-03-04 ENCOUNTER — APPOINTMENT (OUTPATIENT)
Dept: FAMILY MEDICINE | Facility: CLINIC | Age: 84
End: 2019-03-04
Payer: MEDICARE

## 2019-03-04 ENCOUNTER — LABORATORY RESULT (OUTPATIENT)
Age: 84
End: 2019-03-04

## 2019-03-04 ENCOUNTER — TRANSCRIPTION ENCOUNTER (OUTPATIENT)
Age: 84
End: 2019-03-04

## 2019-03-04 VITALS — HEART RATE: 72 BPM | DIASTOLIC BLOOD PRESSURE: 80 MMHG | RESPIRATION RATE: 16 BRPM | SYSTOLIC BLOOD PRESSURE: 110 MMHG

## 2019-03-04 VITALS
HEIGHT: 69 IN | SYSTOLIC BLOOD PRESSURE: 110 MMHG | HEART RATE: 74 BPM | OXYGEN SATURATION: 97 % | DIASTOLIC BLOOD PRESSURE: 70 MMHG | WEIGHT: 204 LBS | BODY MASS INDEX: 30.21 KG/M2

## 2019-03-04 DIAGNOSIS — M79.10 MYALGIA, UNSPECIFIED SITE: ICD-10-CM

## 2019-03-04 LAB — INR PPP: 2.1 RATIO

## 2019-03-04 PROCEDURE — 99213 OFFICE O/P EST LOW 20 MIN: CPT | Mod: 25

## 2019-03-04 PROCEDURE — G0009: CPT

## 2019-03-04 PROCEDURE — G0439: CPT

## 2019-03-04 PROCEDURE — 90732 PPSV23 VACC 2 YRS+ SUBQ/IM: CPT

## 2019-03-04 RX ORDER — TIZANIDINE HYDROCHLORIDE 4 MG/1
4 CAPSULE ORAL
Qty: 60 | Refills: 0 | Status: COMPLETED | COMMUNITY
Start: 2018-01-10 | End: 2019-03-04

## 2019-03-04 RX ORDER — METAXALONE 800 MG/1
800 TABLET ORAL 3 TIMES DAILY
Qty: 45 | Refills: 0 | Status: COMPLETED | COMMUNITY
Start: 2018-01-22 | End: 2019-03-04

## 2019-03-04 NOTE — HISTORY OF PRESENT ILLNESS
[FreeTextEntry1] : c/o  neck back shoulder hip  pain  [de-identified] : has  multiple  back and  joint pains and muscle pains

## 2019-03-04 NOTE — HEALTH RISK ASSESSMENT
[Very Good] : ~his/her~  mood as very good [Intercurrent ED visits] : went to ED [No falls in past year] : Patient reported no falls in the past year [0] : 2) Feeling down, depressed, or hopeless: Not at all (0) [None] : None [Alone] : lives alone [Retired] : retired [High School] : high school [] :  [Fully functional (bathing, dressing, toileting, transferring, walking, feeding)] : Fully functional (bathing, dressing, toileting, transferring, walking, feeding) [Fully functional (using the telephone, shopping, preparing meals, housekeeping, doing laundry, using] : Fully functional and needs no help or supervision to perform IADLs (using the telephone, shopping, preparing meals, housekeeping, doing laundry, using transportation, managing medications and managing finances) [Reports changes in hearing] : Reports changes in hearing [Reports changes in vision] : Reports changes in vision [Smoke Detector] : smoke detector [Carbon Monoxide Detector] : carbon monoxide detector [Seat Belt] :  uses seat belt [] : No [de-identified] : occ  [Reports changes in dental health] : Reports no changes in dental health [de-identified] : hearing  aids  [de-identified] : macular degeneration

## 2019-03-05 LAB
ALBUMIN SERPL ELPH-MCNC: 5.1 G/DL
ALP BLD-CCNC: 68 U/L
ALT SERPL-CCNC: 20 U/L
ANION GAP SERPL CALC-SCNC: 13 MMOL/L
APPEARANCE: CLEAR
AST SERPL-CCNC: 24 U/L
BACTERIA: NEGATIVE
BASOPHILS # BLD AUTO: 0.05 K/UL
BASOPHILS NFR BLD AUTO: 1.1 %
BILIRUB SERPL-MCNC: 0.4 MG/DL
BILIRUBIN URINE: NEGATIVE
BLOOD URINE: NEGATIVE
BUN SERPL-MCNC: 31 MG/DL
C3 SERPL-MCNC: 147 MG/DL
CALCIUM SERPL-MCNC: 10.3 MG/DL
CCP AB SER IA-ACNC: <8 UNITS
CHLORIDE SERPL-SCNC: 106 MMOL/L
CHOLEST SERPL-MCNC: 172 MG/DL
CHOLEST/HDLC SERPL: 3.9 RATIO
CO2 SERPL-SCNC: 25 MMOL/L
COLOR: YELLOW
CREAT SERPL-MCNC: 1.2 MG/DL
CREAT SPEC-SCNC: 93 MG/DL
CRP SERPL-MCNC: <0.1 MG/DL
EOSINOPHIL # BLD AUTO: 0.21 K/UL
EOSINOPHIL NFR BLD AUTO: 4.5 %
ERYTHROCYTE [SEDIMENTATION RATE] IN BLOOD BY WESTERGREN METHOD: 14 MM/HR
GLUCOSE QUALITATIVE U: NEGATIVE
GLUCOSE SERPL-MCNC: 78 MG/DL
HBA1C MFR BLD HPLC: 5.5 %
HCT VFR BLD CALC: 47 %
HDLC SERPL-MCNC: 44 MG/DL
HGB BLD-MCNC: 14.5 G/DL
HYALINE CASTS: 0 /LPF
IMM GRANULOCYTES NFR BLD AUTO: 0.2 %
KETONES URINE: NEGATIVE
LDLC SERPL CALC-MCNC: 96 MG/DL
LEUKOCYTE ESTERASE URINE: NEGATIVE
LYMPHOCYTES # BLD AUTO: 1.54 K/UL
LYMPHOCYTES NFR BLD AUTO: 33 %
MAN DIFF?: NORMAL
MCHC RBC-ENTMCNC: 29.4 PG
MCHC RBC-ENTMCNC: 30.9 GM/DL
MCV RBC AUTO: 95.1 FL
MICROALBUMIN 24H UR DL<=1MG/L-MCNC: 1.3 MG/DL
MICROALBUMIN/CREAT 24H UR-RTO: 14 MG/G
MICROSCOPIC-UA: NORMAL
MONOCYTES # BLD AUTO: 0.6 K/UL
MONOCYTES NFR BLD AUTO: 12.9 %
NEUTROPHILS # BLD AUTO: 2.25 K/UL
NEUTROPHILS NFR BLD AUTO: 48.3 %
NITRITE URINE: NEGATIVE
PH URINE: 6
PLATELET # BLD AUTO: 187 K/UL
POTASSIUM SERPL-SCNC: 4.8 MMOL/L
PROT SERPL-MCNC: 7.6 G/DL
PROTEIN URINE: NEGATIVE
RBC # BLD: 4.94 M/UL
RBC # FLD: 13.8 %
RED BLOOD CELLS URINE: 2 /HPF
RF+CCP IGG SER-IMP: NEGATIVE
RHEUMATOID FACT SER QL: <10 IU/ML
SODIUM SERPL-SCNC: 144 MMOL/L
SPECIFIC GRAVITY URINE: 1.02
SQUAMOUS EPITHELIAL CELLS: 1 /HPF
T4 SERPL-MCNC: 6 UG/DL
TRIGL SERPL-MCNC: 160 MG/DL
TSH SERPL-ACNC: 1.29 UIU/ML
URATE SERPL-MCNC: 4.8 MG/DL
UROBILINOGEN URINE: NORMAL
WBC # FLD AUTO: 4.66 K/UL
WHITE BLOOD CELLS URINE: 1 /HPF

## 2019-03-06 ENCOUNTER — RX RENEWAL (OUTPATIENT)
Age: 84
End: 2019-03-06

## 2019-03-06 LAB — B BURGDOR IGG+IGM SER QL IB: NORMAL

## 2019-03-07 LAB — ANA SER IF-ACNC: NEGATIVE

## 2019-03-18 ENCOUNTER — RX RENEWAL (OUTPATIENT)
Age: 84
End: 2019-03-18

## 2019-03-21 ENCOUNTER — RX RENEWAL (OUTPATIENT)
Age: 84
End: 2019-03-21

## 2019-04-02 ENCOUNTER — RX RENEWAL (OUTPATIENT)
Age: 84
End: 2019-04-02

## 2019-04-07 ENCOUNTER — RX RENEWAL (OUTPATIENT)
Age: 84
End: 2019-04-07

## 2019-04-08 ENCOUNTER — RX RENEWAL (OUTPATIENT)
Age: 84
End: 2019-04-08

## 2019-04-11 ENCOUNTER — RX RENEWAL (OUTPATIENT)
Age: 84
End: 2019-04-11

## 2019-04-15 ENCOUNTER — RX RENEWAL (OUTPATIENT)
Age: 84
End: 2019-04-15

## 2019-04-26 ENCOUNTER — RX RENEWAL (OUTPATIENT)
Age: 84
End: 2019-04-26

## 2019-04-27 ENCOUNTER — RX RENEWAL (OUTPATIENT)
Age: 84
End: 2019-04-27

## 2019-05-20 ENCOUNTER — NON-APPOINTMENT (OUTPATIENT)
Age: 84
End: 2019-05-20

## 2019-05-20 ENCOUNTER — APPOINTMENT (OUTPATIENT)
Dept: CARDIOLOGY | Facility: CLINIC | Age: 84
End: 2019-05-20
Payer: MEDICARE

## 2019-05-20 ENCOUNTER — APPOINTMENT (OUTPATIENT)
Dept: DERMATOLOGY | Facility: CLINIC | Age: 84
End: 2019-05-20
Payer: MEDICARE

## 2019-05-20 VITALS
BODY MASS INDEX: 29.18 KG/M2 | SYSTOLIC BLOOD PRESSURE: 120 MMHG | HEART RATE: 49 BPM | OXYGEN SATURATION: 95 % | DIASTOLIC BLOOD PRESSURE: 64 MMHG | WEIGHT: 197 LBS | HEIGHT: 69 IN

## 2019-05-20 VITALS — BODY MASS INDEX: 29.33 KG/M2 | HEIGHT: 69 IN | WEIGHT: 198 LBS

## 2019-05-20 DIAGNOSIS — L85.3 XEROSIS CUTIS: ICD-10-CM

## 2019-05-20 DIAGNOSIS — D22.5 MELANOCYTIC NEVI OF TRUNK: ICD-10-CM

## 2019-05-20 PROCEDURE — 93000 ELECTROCARDIOGRAM COMPLETE: CPT

## 2019-05-20 PROCEDURE — 99214 OFFICE O/P EST MOD 30 MIN: CPT | Mod: 25

## 2019-05-20 PROCEDURE — 85610 PROTHROMBIN TIME: CPT | Mod: QW

## 2019-05-20 PROCEDURE — 99203 OFFICE O/P NEW LOW 30 MIN: CPT

## 2019-05-20 NOTE — PHYSICAL EXAM
[Alert] : alert [Oriented x 3] : ~L oriented x 3 [Well Nourished] : well nourished [FreeTextEntry3] : The following areas were examined and no significant abnormalities were seen except as noted below:\par \par Type II skin\par \par scalp, face, eyelids, nose, lips, ears, neck, chest, abdomen, back,groin, buttocks, right arm, left arm, right hand, left hand,\par right  leg, left leg, right foot, left foot\par \par Right lower chest: 6 x 6 mm, brown, verrucous plaque\par Upper abdomen on right: 3 x 3 mm, brown macule, darker, [place] - not suspicious on dermoscopy\par Back: Mild 2-3 mm, brown macules.\par One  3 mm raised, pink papule on the left mid back.\par Moderate xerosis present on the legs\par \par No suspicious lesions seen

## 2019-05-20 NOTE — PHYSICAL EXAM
[General Appearance - Well Developed] : well developed [Normal Appearance] : normal appearance [Well Groomed] : well groomed [General Appearance - Well Nourished] : well nourished [General Appearance - In No Acute Distress] : no acute distress [Normal Conjunctiva] : the conjunctiva exhibited no abnormalities [No Oral Pallor] : no oral pallor [No Oral Cyanosis] : no oral cyanosis [Normal Jugular Venous V Waves Present] : normal jugular venous V waves present [No Jugular Venous Cook A Waves] : no jugular venous cook A waves [Respiration, Rhythm And Depth] : normal respiratory rhythm and effort [Exaggerated Use Of Accessory Muscles For Inspiration] : no accessory muscle use [Auscultation Breath Sounds / Voice Sounds] : lungs were clear to auscultation bilaterally [Heart Rate And Rhythm] : heart rate and rhythm were normal [Heart Sounds] : normal S1 and S2 [Arterial Pulses Normal] : the arterial pulses were normal [Edema] : no peripheral edema present [Abdomen Soft] : soft [Abdomen Tenderness] : non-tender [Abnormal Walk] : normal gait [Nail Clubbing] : no clubbing of the fingernails [Cyanosis, Localized] : no localized cyanosis [Skin Turgor] : normal skin turgor [] : no rash [Oriented To Time, Place, And Person] : oriented to person, place, and time [Impaired Insight] : insight and judgment were intact [Affect] : the affect was normal [Memory Recent] : recent memory was not impaired [FreeTextEntry1] : no JVD or bruits [Systolic grade ___/6] : A grade [unfilled]/6 systolic murmur was heard.

## 2019-05-20 NOTE — REASON FOR VISIT
[Follow-Up - Clinic] : a clinic follow-up of [Cardiomyopathy] : cardiomyopathy [Coronary Artery Disease] : coronary artery disease [Dyspnea] : dyspnea

## 2019-05-20 NOTE — REVIEW OF SYSTEMS
[Eyeglasses] : currently wearing eyeglasses [see HPI] : see HPI [Joint Pain] : joint pain [Negative] : Heme/Lymph [Recent Weight Loss (___ Lbs)] : no recent weight loss [Blurry Vision] : no blurred vision [Shortness Of Breath] : no shortness of breath [Dyspnea on exertion] : dyspnea during exertion [Chest  Pressure] : no chest pressure [Chest Pain] : no chest pain [Lower Ext Edema] : no extremity edema [Leg Claudication] : no intermittent leg claudication [Palpitations] : no palpitations [Muscle Cramps] : no muscle cramps

## 2019-05-20 NOTE — ASSESSMENT
[FreeTextEntry1] : Seborrheic keratosis on right lower chest.\par Melanocytic nevi on trunk\par Xerosis on legs

## 2019-05-20 NOTE — CONSULT LETTER
[Dear  ___] : Dear  [unfilled], [Consult Letter:] : I had the pleasure of evaluating your patient, [unfilled]. [Consult Closing:] : Thank you very much for allowing me to participate in the care of this patient.  If you have any questions, please do not hesitate to contact me. [Sincerely,] : Sincerely, [FreeTextEntry2] : Isaiah Hart MD [FreeTextEntry1] : Examination of his skin reveals marked xerosis of the legs, and no suspicious lesions.\par \par Please see attached chart note for further details. [FreeTextEntry3] : Valdo Solomon MD\par [address], [address]\par [place] [address]\par Tel ([phone])\par Fax ([phone])\par Private line ([phone])\par

## 2019-05-22 ENCOUNTER — RX RENEWAL (OUTPATIENT)
Age: 84
End: 2019-05-22

## 2019-05-23 ENCOUNTER — OUTPATIENT (OUTPATIENT)
Dept: OUTPATIENT SERVICES | Facility: HOSPITAL | Age: 84
LOS: 1 days | End: 2019-05-23
Payer: MEDICARE

## 2019-05-23 VITALS
HEIGHT: 69.5 IN | HEART RATE: 75 BPM | TEMPERATURE: 98 F | OXYGEN SATURATION: 96 % | SYSTOLIC BLOOD PRESSURE: 172 MMHG | DIASTOLIC BLOOD PRESSURE: 75 MMHG | WEIGHT: 197.09 LBS | RESPIRATION RATE: 18 BRPM

## 2019-05-23 VITALS — WEIGHT: 197.09 LBS | HEIGHT: 69.5 IN

## 2019-05-23 DIAGNOSIS — Z95.1 PRESENCE OF AORTOCORONARY BYPASS GRAFT: Chronic | ICD-10-CM

## 2019-05-23 DIAGNOSIS — S22.39XA FRACTURE OF ONE RIB, UNSPECIFIED SIDE, INITIAL ENCOUNTER FOR CLOSED FRACTURE: Chronic | ICD-10-CM

## 2019-05-23 DIAGNOSIS — R06.09 OTHER FORMS OF DYSPNEA: ICD-10-CM

## 2019-05-23 DIAGNOSIS — Z98.89 OTHER SPECIFIED POSTPROCEDURAL STATES: Chronic | ICD-10-CM

## 2019-05-23 DIAGNOSIS — Z98.49 CATARACT EXTRACTION STATUS, UNSPECIFIED EYE: Chronic | ICD-10-CM

## 2019-05-23 DIAGNOSIS — Z01.810 ENCOUNTER FOR PREPROCEDURAL CARDIOVASCULAR EXAMINATION: ICD-10-CM

## 2019-05-23 LAB
ANION GAP SERPL CALC-SCNC: 14 MMOL/L — SIGNIFICANT CHANGE UP (ref 5–17)
APTT BLD: 47.4 SEC — HIGH (ref 27.5–36.3)
BASOPHILS # BLD AUTO: 0 K/UL — SIGNIFICANT CHANGE UP (ref 0–0.2)
BASOPHILS NFR BLD AUTO: 0.9 % — SIGNIFICANT CHANGE UP (ref 0–2)
BUN SERPL-MCNC: 31 MG/DL — HIGH (ref 8–20)
CALCIUM SERPL-MCNC: 9.5 MG/DL — SIGNIFICANT CHANGE UP (ref 8.6–10.2)
CHLORIDE SERPL-SCNC: 107 MMOL/L — SIGNIFICANT CHANGE UP (ref 98–107)
CO2 SERPL-SCNC: 18 MMOL/L — LOW (ref 22–29)
CREAT SERPL-MCNC: 1.08 MG/DL — SIGNIFICANT CHANGE UP (ref 0.5–1.3)
EOSINOPHIL # BLD AUTO: 0.2 K/UL — SIGNIFICANT CHANGE UP (ref 0–0.5)
EOSINOPHIL NFR BLD AUTO: 4.5 % — SIGNIFICANT CHANGE UP (ref 0–5)
GLUCOSE SERPL-MCNC: 109 MG/DL — SIGNIFICANT CHANGE UP (ref 70–115)
HCT VFR BLD CALC: 44.1 % — SIGNIFICANT CHANGE UP (ref 42–52)
HGB BLD-MCNC: 14.7 G/DL — SIGNIFICANT CHANGE UP (ref 14–18)
INR BLD: 2.8 RATIO — HIGH (ref 0.88–1.16)
LYMPHOCYTES # BLD AUTO: 1.5 K/UL — SIGNIFICANT CHANGE UP (ref 1–4.8)
LYMPHOCYTES # BLD AUTO: 33.3 % — SIGNIFICANT CHANGE UP (ref 20–55)
MAGNESIUM SERPL-MCNC: 2 MG/DL — SIGNIFICANT CHANGE UP (ref 1.6–2.6)
MCHC RBC-ENTMCNC: 29.6 PG — SIGNIFICANT CHANGE UP (ref 27–31)
MCHC RBC-ENTMCNC: 33.3 G/DL — SIGNIFICANT CHANGE UP (ref 32–36)
MCV RBC AUTO: 88.7 FL — SIGNIFICANT CHANGE UP (ref 80–94)
MONOCYTES # BLD AUTO: 0.7 K/UL — SIGNIFICANT CHANGE UP (ref 0–0.8)
MONOCYTES NFR BLD AUTO: 14.9 % — HIGH (ref 3–10)
NEUTROPHILS # BLD AUTO: 2 K/UL — SIGNIFICANT CHANGE UP (ref 1.8–8)
NEUTROPHILS NFR BLD AUTO: 46.2 % — SIGNIFICANT CHANGE UP (ref 37–73)
PLATELET # BLD AUTO: 162 K/UL — SIGNIFICANT CHANGE UP (ref 150–400)
POTASSIUM SERPL-MCNC: 5.1 MMOL/L — SIGNIFICANT CHANGE UP (ref 3.5–5.3)
POTASSIUM SERPL-SCNC: 5.1 MMOL/L — SIGNIFICANT CHANGE UP (ref 3.5–5.3)
PROTHROM AB SERPL-ACNC: 33.2 SEC — HIGH (ref 10–12.9)
RBC # BLD: 4.97 M/UL — SIGNIFICANT CHANGE UP (ref 4.6–6.2)
RBC # FLD: 13.9 % — SIGNIFICANT CHANGE UP (ref 11–15.6)
SODIUM SERPL-SCNC: 139 MMOL/L — SIGNIFICANT CHANGE UP (ref 135–145)
WBC # BLD: 4.4 K/UL — LOW (ref 4.8–10.8)
WBC # FLD AUTO: 4.4 K/UL — LOW (ref 4.8–10.8)

## 2019-05-23 PROCEDURE — 36415 COLL VENOUS BLD VENIPUNCTURE: CPT

## 2019-05-23 PROCEDURE — G0463: CPT

## 2019-05-23 PROCEDURE — 80048 BASIC METABOLIC PNL TOTAL CA: CPT

## 2019-05-23 PROCEDURE — 93005 ELECTROCARDIOGRAM TRACING: CPT

## 2019-05-23 PROCEDURE — 93010 ELECTROCARDIOGRAM REPORT: CPT

## 2019-05-23 PROCEDURE — 85730 THROMBOPLASTIN TIME PARTIAL: CPT

## 2019-05-23 PROCEDURE — 85027 COMPLETE CBC AUTOMATED: CPT

## 2019-05-23 PROCEDURE — 83735 ASSAY OF MAGNESIUM: CPT

## 2019-05-23 PROCEDURE — 85610 PROTHROMBIN TIME: CPT

## 2019-05-23 RX ORDER — WARFARIN SODIUM 2.5 MG/1
1 TABLET ORAL
Qty: 0 | Refills: 0 | DISCHARGE

## 2019-05-23 NOTE — H&P PST ADULT - NSICDXPASTSURGICALHX_GEN_ALL_CORE_FT
PAST SURGICAL HISTORY:  Closed rib fracture     S/P CABG x 3 1996 Leia Enciso    S/P cataract extraction     S/P hernia repair inguinal    S/P tonsillectomy

## 2019-05-23 NOTE — ASU PATIENT PROFILE, ADULT - PSH
Closed rib fracture    S/P CABG x 3  1996 Leia Enciso  S/P cataract extraction    S/P hernia repair  inguinal  S/P tonsillectomy

## 2019-05-23 NOTE — H&P PST ADULT - ASSESSMENT
87 year old man with a history of myocardial infarction, 3V CABG, ICD in the past with cardiomyopathy, afib on coumadin who presents today for [facility].  Given his new symptoms and previous nuclear stress test which showed possible ischemia I will arrange for cardiac cath. I spoke with both pt and son over the phone. Risks/benefits discussed.   Repeat TTE  Advised pt to go to the nearest ED if symptoms persist or worsen .  c/w fenofibrate. Change pravastatin to lipitor  c/w coreg, norvasc, enalapril  advised to start ASA 81mg qd  BP well controlled - c/w current meds  Pt needs ICD check as well - will arrange f/u with EP  The described plan was discussed with the pt. All questions and concerns were addressed to the best of my knowledge.  Here for PST for cardiac cath to r/o further CAD.

## 2019-05-23 NOTE — H&P PST ADULT - NSICDXPASTMEDICALHX_GEN_ALL_CORE_FT
PAST MEDICAL HISTORY:  AICD (automatic cardioverter/defibrillator) present     CAD (coronary artery disease)     Cardiomyopathy, ischemic     CRI (chronic renal insufficiency)     DM (diabetes mellitus) diet controlled per pt    HLD (hyperlipidemia)     HTN (hypertension)     Low back pain chronic, receiving epidurals    PAF (paroxysmal atrial fibrillation)     TIA (transient ischemic attack)

## 2019-05-23 NOTE — ASU PATIENT PROFILE, ADULT - PMH
AICD (automatic cardioverter/defibrillator) present    CAD (coronary artery disease)    Cardiomyopathy, ischemic    CRI (chronic renal insufficiency)    DM (diabetes mellitus)  diet controlled per pt  HLD (hyperlipidemia)    HTN (hypertension)    Low back pain  chronic, receiving epidurals  PAF (paroxysmal atrial fibrillation)    TIA (transient ischemic attack)

## 2019-05-23 NOTE — H&P PST ADULT - HISTORY OF PRESENT ILLNESS
87 year old man with a history of coronary disease S/P MI in 1973, atrial fibrillation history with CVA on coumadin and 3V CABG in 1996 at [facility] and [facility] ([facility]). He tells me that his ICD has never shocked him.   Today he tells me that he has been getting SOB with exertion for the past few mnths, this is new for him. Yesterday at his granddaughters graduation he could not keep up with everyone. He denies CP, diaphoresis, palpitations, dizziness, syncope, LE edema, PND.  Of note anginal symptoms were "feeling exhausted"  Echocardiogram 3/2018 showed new mod-sev LV dysfunction, mod AI, mils TR.  Nuclear stress test 3/2018 showed med sized, mod-sev defects in basal to mid lateral and inferior walls which were partially reversible     TG = 160  LDL = 164 --> 96  Tchol = 235 --> 172  HDL = 44  A1c = 7.2  Cr 1.2

## 2019-06-05 ENCOUNTER — INPATIENT (INPATIENT)
Facility: HOSPITAL | Age: 84
LOS: 0 days | Discharge: HOME | DRG: 247 | End: 2019-06-06
Attending: INTERNAL MEDICINE | Admitting: INTERNAL MEDICINE
Payer: MEDICARE

## 2019-06-05 ENCOUNTER — TRANSCRIPTION ENCOUNTER (OUTPATIENT)
Age: 84
End: 2019-06-05

## 2019-06-05 VITALS
TEMPERATURE: 98 F | RESPIRATION RATE: 18 BRPM | DIASTOLIC BLOOD PRESSURE: 67 MMHG | WEIGHT: 197.09 LBS | HEART RATE: 64 BPM | HEIGHT: 69 IN | SYSTOLIC BLOOD PRESSURE: 133 MMHG | OXYGEN SATURATION: 94 %

## 2019-06-05 DIAGNOSIS — I25.10 ATHEROSCLEROTIC HEART DISEASE OF NATIVE CORONARY ARTERY WITHOUT ANGINA PECTORIS: ICD-10-CM

## 2019-06-05 DIAGNOSIS — Z95.1 PRESENCE OF AORTOCORONARY BYPASS GRAFT: Chronic | ICD-10-CM

## 2019-06-05 DIAGNOSIS — Z98.89 OTHER SPECIFIED POSTPROCEDURAL STATES: Chronic | ICD-10-CM

## 2019-06-05 DIAGNOSIS — Z98.49 CATARACT EXTRACTION STATUS, UNSPECIFIED EYE: Chronic | ICD-10-CM

## 2019-06-05 DIAGNOSIS — S22.39XA FRACTURE OF ONE RIB, UNSPECIFIED SIDE, INITIAL ENCOUNTER FOR CLOSED FRACTURE: Chronic | ICD-10-CM

## 2019-06-05 DIAGNOSIS — Z01.810 ENCOUNTER FOR PREPROCEDURAL CARDIOVASCULAR EXAMINATION: ICD-10-CM

## 2019-06-05 LAB
APTT BLD: 38.8 SEC — HIGH (ref 27.5–36.3)
BLD GP AB SCN SERPL QL: SIGNIFICANT CHANGE UP
GLUCOSE BLDC GLUCOMTR-MCNC: 133 MG/DL — HIGH (ref 70–99)
INR BLD: 1.42 RATIO — HIGH (ref 0.88–1.16)
PROTHROM AB SERPL-ACNC: 16.5 SEC — HIGH (ref 10–12.9)
TYPE + AB SCN PNL BLD: SIGNIFICANT CHANGE UP

## 2019-06-05 PROCEDURE — 93458 L HRT ARTERY/VENTRICLE ANGIO: CPT | Mod: 26,XU

## 2019-06-05 PROCEDURE — 99152 MOD SED SAME PHYS/QHP 5/>YRS: CPT

## 2019-06-05 PROCEDURE — 99222 1ST HOSP IP/OBS MODERATE 55: CPT | Mod: 25

## 2019-06-05 PROCEDURE — 93567 NJX CAR CTH SPRVLV AORTGRPHY: CPT

## 2019-06-05 PROCEDURE — 76937 US GUIDE VASCULAR ACCESS: CPT | Mod: 26

## 2019-06-05 PROCEDURE — 92928 PRQ TCAT PLMT NTRAC ST 1 LES: CPT | Mod: LD

## 2019-06-05 RX ORDER — THIAMINE MONONITRATE (VIT B1) 100 MG
100 TABLET ORAL DAILY
Refills: 0 | Status: DISCONTINUED | OUTPATIENT
Start: 2019-06-05 | End: 2019-06-06

## 2019-06-05 RX ORDER — ALPRAZOLAM 0.25 MG
0.25 TABLET ORAL EVERY 6 HOURS
Refills: 0 | Status: DISCONTINUED | OUTPATIENT
Start: 2019-06-05 | End: 2019-06-06

## 2019-06-05 RX ORDER — WARFARIN SODIUM 2.5 MG/1
2.5 TABLET ORAL ONCE
Refills: 0 | Status: COMPLETED | OUTPATIENT
Start: 2019-06-05 | End: 2019-06-05

## 2019-06-05 RX ORDER — ASPIRIN/CALCIUM CARB/MAGNESIUM 324 MG
81 TABLET ORAL DAILY
Refills: 0 | Status: DISCONTINUED | OUTPATIENT
Start: 2019-06-05 | End: 2019-06-06

## 2019-06-05 RX ORDER — WARFARIN SODIUM 2.5 MG/1
5 TABLET ORAL ONCE
Refills: 0 | Status: DISCONTINUED | OUTPATIENT
Start: 2019-06-05 | End: 2019-06-05

## 2019-06-05 RX ORDER — CLOPIDOGREL BISULFATE 75 MG/1
600 TABLET, FILM COATED ORAL ONCE
Refills: 0 | Status: COMPLETED | OUTPATIENT
Start: 2019-06-05 | End: 2019-06-05

## 2019-06-05 RX ORDER — AMLODIPINE BESYLATE 2.5 MG/1
5 TABLET ORAL DAILY
Refills: 0 | Status: DISCONTINUED | OUTPATIENT
Start: 2019-06-05 | End: 2019-06-06

## 2019-06-05 RX ORDER — PREGABALIN 225 MG/1
1000 CAPSULE ORAL DAILY
Refills: 0 | Status: DISCONTINUED | OUTPATIENT
Start: 2019-06-05 | End: 2019-06-06

## 2019-06-05 RX ORDER — ATORVASTATIN CALCIUM 80 MG/1
40 TABLET, FILM COATED ORAL AT BEDTIME
Refills: 0 | Status: DISCONTINUED | OUTPATIENT
Start: 2019-06-05 | End: 2019-06-06

## 2019-06-05 RX ORDER — CARVEDILOL PHOSPHATE 80 MG/1
6.25 CAPSULE, EXTENDED RELEASE ORAL EVERY 12 HOURS
Refills: 0 | Status: DISCONTINUED | OUTPATIENT
Start: 2019-06-05 | End: 2019-06-06

## 2019-06-05 RX ORDER — FOLIC ACID 0.8 MG
1 TABLET ORAL DAILY
Refills: 0 | Status: DISCONTINUED | OUTPATIENT
Start: 2019-06-05 | End: 2019-06-06

## 2019-06-05 RX ORDER — CLOPIDOGREL BISULFATE 75 MG/1
75 TABLET, FILM COATED ORAL DAILY
Refills: 0 | Status: DISCONTINUED | OUTPATIENT
Start: 2019-06-05 | End: 2019-06-06

## 2019-06-05 RX ORDER — FENOFIBRATE,MICRONIZED 130 MG
145 CAPSULE ORAL DAILY
Refills: 0 | Status: DISCONTINUED | OUTPATIENT
Start: 2019-06-05 | End: 2019-06-06

## 2019-06-05 RX ORDER — CHOLECALCIFEROL (VITAMIN D3) 125 MCG
1000 CAPSULE ORAL DAILY
Refills: 0 | Status: DISCONTINUED | OUTPATIENT
Start: 2019-06-05 | End: 2019-06-06

## 2019-06-05 RX ORDER — ALBUTEROL 90 UG/1
2.5 AEROSOL, METERED ORAL ONCE
Refills: 0 | Status: COMPLETED | OUTPATIENT
Start: 2019-06-05 | End: 2019-06-05

## 2019-06-05 RX ORDER — SODIUM CHLORIDE 9 MG/ML
1000 INJECTION INTRAMUSCULAR; INTRAVENOUS; SUBCUTANEOUS
Refills: 0 | Status: DISCONTINUED | OUTPATIENT
Start: 2019-06-05 | End: 2019-06-06

## 2019-06-05 RX ORDER — ASPIRIN/CALCIUM CARB/MAGNESIUM 324 MG
1 TABLET ORAL
Qty: 0 | Refills: 0 | DISCHARGE

## 2019-06-05 RX ADMIN — ALBUTEROL 2.5 MILLIGRAM(S): 90 AEROSOL, METERED ORAL at 13:49

## 2019-06-05 RX ADMIN — Medication 0.25 MILLIGRAM(S): at 23:55

## 2019-06-05 RX ADMIN — ATORVASTATIN CALCIUM 40 MILLIGRAM(S): 80 TABLET, FILM COATED ORAL at 21:34

## 2019-06-05 RX ADMIN — CARVEDILOL PHOSPHATE 6.25 MILLIGRAM(S): 80 CAPSULE, EXTENDED RELEASE ORAL at 17:51

## 2019-06-05 RX ADMIN — WARFARIN SODIUM 2.5 MILLIGRAM(S): 2.5 TABLET ORAL at 21:34

## 2019-06-05 RX ADMIN — CLOPIDOGREL BISULFATE 600 MILLIGRAM(S): 75 TABLET, FILM COATED ORAL at 17:55

## 2019-06-05 RX ADMIN — SODIUM CHLORIDE 75 MILLILITER(S): 9 INJECTION INTRAMUSCULAR; INTRAVENOUS; SUBCUTANEOUS at 13:50

## 2019-06-05 NOTE — DISCHARGE NOTE PROVIDER - NSDCACTIVITY_GEN_ALL_CORE
Showering allowed/No heavy lifting/straining/Do not make important decisions/Do not drive or operate machinery

## 2019-06-05 NOTE — DISCHARGE NOTE PROVIDER - NSDCCPCAREPLAN_GEN_ALL_CORE_FT
PRINCIPAL DISCHARGE DIAGNOSIS  Diagnosis: Coronary stent patent  Assessment and Plan of Treatment: PRINCIPAL DISCHARGE DIAGNOSIS  Diagnosis: Coronary stent patent  Assessment and Plan of Treatment: DESx2 D1

## 2019-06-05 NOTE — DISCHARGE NOTE PROVIDER - CARE PROVIDER_API CALL
Alexander Rasmussen)  Cardiovascular Disease; Interventional Cardiology  [address] [facility], [address] [place] [address]  Phone: [phone]  Fax: [phone]  Follow Up Time: 2 weeks Alexander Rasmussen)  Cardiovascular Disease; Interventional Cardiology  [address] [facility], [address]  [place] [address]  Phone: [phone]  Fax: [phone]  Follow Up Time: 2 weeks    Yenifer Sinclair (DO)  Internal Medicine  [address] [facility], [address]  [place] [address]  Phone: [phone]  Fax: [phone]  Follow Up Time:     [address]  [place]  [phone]  Phone: (   )    -  Fax: (   )    -  Follow Up Time:

## 2019-06-05 NOTE — DISCHARGE NOTE PROVIDER - NSDCFUADDAPPT_GEN_ALL_CORE_FT
Check Coumadin level on friday maintain between 2-3     No heavy lifting, driving, sex, tub baths, swimming, or any activity that submerges the lower half of the body in water for 48 hours.  Limited walking and stairs for 48 hours.    Change the bandaid after 24 hours and every 24 hours after that.  Keep the puncture site dry and covered with a bandaid until a scab forms.      Observe the site frequently.  If bleeding or a large lump (the size of a golf ball or bigger) occurs lie flat, apply continuous direct pressure just above the puncture site for at least 10 minutes, and notify your physician immediately.  If the bleeding cannot be controlled, call 911 immediately for assistance.  Notify your physician of pain, swelling or any drainage.      Notify your physician immediately if coldness, numbness, discoloration or pain in your foot occurs. Check Coumadin level on friday maintain between 2-3   Also check kidney function same day with Dr Baptiste    No heavy lifting, driving, sex, tub baths, swimming, or any activity that submerges the lower half of the body in water for 48 hours.  Limited walking and stairs for 48 hours.    Change the bandaid after 24 hours and every 24 hours after that.  Keep the puncture site dry and covered with a bandaid until a scab forms.      Observe the site frequently.  If bleeding or a large lump (the size of a golf ball or bigger) occurs lie flat, apply continuous direct pressure just above the puncture site for at least 10 minutes, and notify your physician immediately.  If the bleeding cannot be controlled, call 911 immediately for assistance.  Notify your physician of pain, swelling or any drainage.      Notify your physician immediately if coldness, numbness, discoloration or pain in your foot occurs. Check Coumadin level on friday maintain between 2-3   Also check kidney function same day with Dr Baptiste    No heavy lifting, driving, sex, tub baths, swimming, or any activity that submerges the lower half of the body in water for 48 hours.  Limited walking and stairs for 48 hours.    Change the bandaid after 24 hours and every 24 hours after that.  Keep the puncture site dry and covered with a bandaid until a scab forms.      Observe the site frequently.  If bleeding or a large lump (the size of a golf ball or bigger) occurs lie flat, apply continuous direct pressure just above the puncture site for at least 10 minutes, and notify your physician immediately.  If the bleeding cannot be controlled, call 911 immediately for assistance.  Notify your physician of pain, swelling or any drainage.      Notify your physician immediately if coldness, numbness, discoloration or pain in your foot occurs.    PLEASE RECONSIDER GETTING A LIFEALERT AS YOU LIVE ALONE

## 2019-06-05 NOTE — DISCHARGE NOTE PROVIDER - HOSPITAL COURSE
s/p LHC RFA w/angioseal    SYNERGY ELOY x2 to D1                    REVIEW OF SYSTEMS:    Denies SOB, CP, NV, HA, dizziness, palpitations, site pain        PHYSICAL EXAM: A&Ox3 NAD Skin warm and dry    NEURO: Speech intact +gag +swallow Tongue midline VILLEGAS    NECK: No JVD, trachea midline. Eupneic    HEART: 2/6 SM noted ECG unchanged    PULMONARY:  CTA charles    ABDOMEN: Soft nontender X4 +BS Vdg/eating    EXTREMITIES: RFA site: No bleed, hematoma, pain, ecchymosis or swelling Rt DP/PT+ Dressing removed w/ instructions to pt for right groin care. s/p C RFA w/angioseal    SYNERGY ELOY x2 to D1    OOB ambulating  without fatigue, SOB or CP    Ate well    VSS Afebrile        REVIEW OF SYSTEMS:    Denies SOB, CP, NV, HA, dizziness, palpitations, site pain        PHYSICAL EXAM: A&Ox3 NAD Skin warm and dry    NEURO: Speech intact +gag +swallow Tongue midline VILLEGAS    NECK: No JVD, trachea midline. Eupneic    HEART: 2/6 SM noted ECG unchanged Tele not retrivable. ICD no pacing spikes    PULMONARY:  CTA charles    ABDOMEN: Soft nontender X4 +BS Vdg/eating    EXTREMITIES: RFA site: No bleed, hematoma, pain, ecchymosis or swelling Rt DP/PT+ Dressing removed w/ instructions to pt for right groin care.        A-s/p PCI ELOY x 2 D1        P- Warfarin/Plavix for AF/CAD    Rt groin care instructions reviewed with pt.    BMP/INR to be drawn Friday June 7 with Dr Hart to follow to monitor renal function/INR    Will keep on atrovastatin 40 mg po

## 2019-06-05 NOTE — PROGRESS NOTE ADULT - SUBJECTIVE AND OBJECTIVE BOX
SP [facility] ELOY X 2 to DIAG    complaints of dyspnea/inability to take a deep breath at rest     VSS   lungs CTA Bilat no use of accessory muscles  S1 S2 No RMG  sitting up eating with out complains at this minute  EKG repeated  No acute changes note     We will continue to monitor.

## 2019-06-05 NOTE — DISCHARGE NOTE PROVIDER - CARE PROVIDERS DIRECT ADDRESSES
,[email or web address],DirectAddress_Unknown

## 2019-06-05 NOTE — DISCHARGE NOTE PROVIDER - PROVIDER TOKENS
PROVIDER:[TOKEN:[[id number]],FOLLOWUP:[2 weeks]] PROVIDER:[TOKEN:[[id number]],FOLLOWUP:[2 weeks]],PROVIDER:[TOKEN:[[id number]]],FREE:[LAST:[blending],FIRST:[giselle],PHONE:[(   )    -],FAX:[(   )    -],ADDRESS:[[facility] [address] [place]  [phone]]]

## 2019-06-05 NOTE — DISCHARGE NOTE PROVIDER - NSDCFUADDINST_GEN_ALL_CORE_FT
No heavy lifting, driving, sex, tub baths, swimming, or any activity that submerges the lower half of the body in water for 48 hours.  Limited walking and stairs for 48 hours.    Change the bandaid after 24 hours and every 24 hours after that.  Keep the puncture site dry and covered with a bandaid until a scab forms.    Observe the site frequently.  If bleeding or a large lump (the size of a golf ball or bigger) occurs lie flat, apply continuous direct pressure just above the puncture site for at least 10 minutes, and notify your physician immediately.  If the bleeding cannot be controlled, call 911 immediately for assistance.  Notify your physician of pain, swelling or any drainage.    Notify your physician immediately if coldness, numbness, discoloration or pain in your foot occurs.  YOUR DRESSING WAS REMOVED

## 2019-06-05 NOTE — PROGRESS NOTE ADULT - SUBJECTIVE AND OBJECTIVE BOX
Department of Cardiology                                                                  [facility]/[place] [address] [address]                                                            Telephone: [phone]. Fax:[phone]                                                                           Cardiac Catheterization Note       Narrative:  87y  Male is now s/p left heart catheterization via (right  groin) approach with Dr. Rasmussen which showed: Diag closure received 2 ELOY.            PAST MEDICAL & SURGICAL HISTORY:  PAF (paroxysmal atrial fibrillation)  CRI (chronic renal insufficiency)  Low back pain: chronic, receiving epidurals  TIA (transient ischemic attack)  HLD (hyperlipidemia)  HTN (hypertension)  DM (diabetes mellitus): diet controlled per pt  AICD (automatic cardioverter/defibrillator) present  Cardiomyopathy, ischemic  CAD (coronary artery disease)  Closed rib fracture  S/P cataract extraction  S/P hernia repair: inguinal  S/P tonsillectomy  S/P CABG x 3: 1996 Leia Enciso      Home Medications:  allopurinol 300 mg oral tablet: 1 tab(s) orally once a day (05 Jun 2019 08:41)  amLODIPine 5 mg oral tablet: 1 tab(s) orally once a day (05 Jun 2019 08:41)  Analpram-HC 1%-1% rectal cream: 1 applicatorful rectal 3 times a day, As Needed (05 Jun 2019 08:41)  aspirin 81 mg oral tablet: 1 tab(s) orally once a day (05 Jun 2019 08:41)  atorvastatin 40 mg oral tablet: 1 tab(s) orally once a day (05 Jun 2019 08:41)  Coreg 6.25 mg oral tablet: 1 tab(s) orally 2 times a day (05 Jun 2019 08:41)  enalapril 2.5 mg oral tablet: 1 tab(s) orally once a day (05 Jun 2019 08:41)  fenofibrate 160 mg oral tablet: 1 tab(s) orally once a day (05 Jun 2019 08:41)  folic acid 1 mg oral tablet: 1 tab(s) orally once a day (05 Jun 2019 08:41)  glimepiride 2 mg oral tablet: 1 tab(s) orally once a day (05 Jun 2019 08:41)  Multiple Vitamins oral tablet: 1 tab(s) orally once a day (05 Jun 2019 08:41)  pravastatin 20 mg oral tablet: 1 tab(s) orally once a day (05 Jun 2019 08:41)  PreserVision AREDS 2 oral capsule: 1 cap(s) orally once a day (05 Jun 2019 08:41)  QUEtiapine 25 mg oral tablet: 0.5 tab(s) orally once a day (at bedtime) (05 Jun 2019 08:41)  thiamine 100 mg oral tablet: 1 tab(s) orally once a day (05 Jun 2019 08:41)  tiZANidine 4 mg oral capsule: 1 cap(s) orally 3 times a day, As Needed (05 Jun 2019 08:41)  traMADol 50 mg oral tablet: 1 tab(s) orally 3 times a day, As Needed (05 Jun 2019 08:41)  Vitamin B12 1000 mcg oral tablet: 1 tab(s) orally once a day (05 Jun 2019 08:41)  Vitamin D3 1000 intl units oral tablet: 1 tab(s) orally once a day (05 Jun 2019 08:41)  warfarin 5 mg oral tablet: 1 tab(s) orally 5 times a week  and takes  4  mg  twice weekly on  monday and thursday (05 Jun 2019 08:41)    Patient is a 87y old  Male who presents with a chief complaint of   HEALTH ISSUES - PROBLEM Dx:        General: No fatigue, no fevers/chills  Respiratory: No dyspnea, no cough, no wheeze  CV: No chest pain, no palpitations  Abd: No nausea  Neuro: No headache, no dizziness  No Known Allergies      Objective:  Vital Signs Last 24 Hrs  T(C): 36.8 (05 Jun 2019 08:28), Max: 36.8 (05 Jun 2019 08:28)  T(F): 98.2 (05 Jun 2019 08:28), Max: 98.2 (05 Jun 2019 08:28)  HR: 64 (05 Jun 2019 12:00) (64 - 69)  BP: 134/61 (05 Jun 2019 12:00) (133/67 - 151/75)  BP(mean): --  RR: 17 (05 Jun 2019 12:00) (16 - 18)  SpO2: 94% (05 Jun 2019 12:00) (93% - 94%)    CM: SR  Neuro: A&OX3, CN 2-12 intact  HEENT: NC, AT  Lungs: CTA B/L  CV: S1, S2, no murmur, RRR  Abd: Soft  Right Groin: Soft, no bleeding, no hematoma  Extremity: + distal pulses  EKG: no acute changes post Cath           PLAN     -post cardiac cath orders  -groin precautions  -continue current medical therapy  -DAPT (ASA and plavix)  -statin  -BB  -follow up outpt in 2 weeks  - plan for am discharge   - he will receive plavix load at 730 pm tonight Department of Cardiology                                                                  [facility]/[place] [address] [address]                                                            Telephone: [phone]. Fax:[phone]                                                                           Cardiac Catheterization Note       Narrative:  87y  Male is now s/p left heart catheterization via (right  groin) approach with Dr. Rasmussen which showed: Diag closure received 2 ELOY.            PAST MEDICAL & SURGICAL HISTORY:  PAF (paroxysmal atrial fibrillation)  CRI (chronic renal insufficiency)  Low back pain: chronic, receiving epidurals  TIA (transient ischemic attack)  HLD (hyperlipidemia)  HTN (hypertension)  DM (diabetes mellitus): diet controlled per pt  AICD (automatic cardioverter/defibrillator) present  Cardiomyopathy, ischemic  CAD (coronary artery disease)  Closed rib fracture  S/P cataract extraction  S/P hernia repair: inguinal  S/P tonsillectomy  S/P CABG x 3: 1996 Leia Enciso      Home Medications:  allopurinol 300 mg oral tablet: 1 tab(s) orally once a day (05 Jun 2019 08:41)  amLODIPine 5 mg oral tablet: 1 tab(s) orally once a day (05 Jun 2019 08:41)  Analpram-HC 1%-1% rectal cream: 1 applicatorful rectal 3 times a day, As Needed (05 Jun 2019 08:41)  aspirin 81 mg oral tablet: 1 tab(s) orally once a day (05 Jun 2019 08:41)  atorvastatin 40 mg oral tablet: 1 tab(s) orally once a day (05 Jun 2019 08:41)  Coreg 6.25 mg oral tablet: 1 tab(s) orally 2 times a day (05 Jun 2019 08:41)  enalapril 2.5 mg oral tablet: 1 tab(s) orally once a day (05 Jun 2019 08:41)  fenofibrate 160 mg oral tablet: 1 tab(s) orally once a day (05 Jun 2019 08:41)  folic acid 1 mg oral tablet: 1 tab(s) orally once a day (05 Jun 2019 08:41)  glimepiride 2 mg oral tablet: 1 tab(s) orally once a day (05 Jun 2019 08:41)  Multiple Vitamins oral tablet: 1 tab(s) orally once a day (05 Jun 2019 08:41)  pravastatin 20 mg oral tablet: 1 tab(s) orally once a day (05 Jun 2019 08:41)  PreserVision AREDS 2 oral capsule: 1 cap(s) orally once a day (05 Jun 2019 08:41)  QUEtiapine 25 mg oral tablet: 0.5 tab(s) orally once a day (at bedtime) (05 Jun 2019 08:41)  thiamine 100 mg oral tablet: 1 tab(s) orally once a day (05 Jun 2019 08:41)  tiZANidine 4 mg oral capsule: 1 cap(s) orally 3 times a day, As Needed (05 Jun 2019 08:41)  traMADol 50 mg oral tablet: 1 tab(s) orally 3 times a day, As Needed (05 Jun 2019 08:41)  Vitamin B12 1000 mcg oral tablet: 1 tab(s) orally once a day (05 Jun 2019 08:41)  Vitamin D3 1000 intl units oral tablet: 1 tab(s) orally once a day (05 Jun 2019 08:41)  warfarin 5 mg oral tablet: 1 tab(s) orally 5 times a week  and takes  4  mg  twice weekly on  monday and thursday (05 Jun 2019 08:41)    Patient is a 87y old  Male who presents with a chief complaint of   HEALTH ISSUES - PROBLEM Dx:        General: No fatigue, no fevers/chills  Respiratory: No dyspnea, no cough, no wheeze  CV: No chest pain, no palpitations  Abd: No nausea  Neuro: No headache, no dizziness  No Known Allergies      Objective:  Vital Signs Last 24 Hrs  T(C): 36.8 (05 Jun 2019 08:28), Max: 36.8 (05 Jun 2019 08:28)  T(F): 98.2 (05 Jun 2019 08:28), Max: 98.2 (05 Jun 2019 08:28)  HR: 64 (05 Jun 2019 12:00) (64 - 69)  BP: 134/61 (05 Jun 2019 12:00) (133/67 - 151/75)  BP(mean): --  RR: 17 (05 Jun 2019 12:00) (16 - 18)  SpO2: 94% (05 Jun 2019 12:00) (93% - 94%)    CM: SR  Neuro: A&OX3, CN 2-12 intact  HEENT: NC, AT  Lungs: CTA B/L  CV: S1, S2, no murmur, RRR  Abd: Soft  Right Groin: Soft, no bleeding, no hematoma  Extremity: + distal pulses  EKG: no acute changes post Cath           PLAN     -post cardiac cath orders  -groin precautions  -continue current medical therapy  -DAPT (Coumadin and plavix)  -statin  -BB  -follow up outpt in 2 weeks  - plan for am discharge   - he will receive plavix load at 730 pm tonight Department of Cardiology                                                                  [facility]/[place] [address] [address]                                                            Telephone: [phone]. Fax:[phone]                                                                           Cardiac Catheterization Note       Narrative:  87y  Male is now s/p left heart catheterization via (right  groin) approach with Dr. Rasmussen which showed: Diag closure received 2 ELOY.            PAST MEDICAL & SURGICAL HISTORY:  PAF (paroxysmal atrial fibrillation)  CRI (chronic renal insufficiency)  Low back pain: chronic, receiving epidurals  TIA (transient ischemic attack)  HLD (hyperlipidemia)  HTN (hypertension)  DM (diabetes mellitus): diet controlled per pt  AICD (automatic cardioverter/defibrillator) present  Cardiomyopathy, ischemic  CAD (coronary artery disease)  Closed rib fracture  S/P cataract extraction  S/P hernia repair: inguinal  S/P tonsillectomy  S/P CABG x 3: 1996 Leia Enciso      Home Medications:  allopurinol 300 mg oral tablet: 1 tab(s) orally once a day (05 Jun 2019 08:41)  amLODIPine 5 mg oral tablet: 1 tab(s) orally once a day (05 Jun 2019 08:41)  Analpram-HC 1%-1% rectal cream: 1 applicatorful rectal 3 times a day, As Needed (05 Jun 2019 08:41)  aspirin 81 mg oral tablet: 1 tab(s) orally once a day (05 Jun 2019 08:41)  atorvastatin 40 mg oral tablet: 1 tab(s) orally once a day (05 Jun 2019 08:41)  Coreg 6.25 mg oral tablet: 1 tab(s) orally 2 times a day (05 Jun 2019 08:41)  enalapril 2.5 mg oral tablet: 1 tab(s) orally once a day (05 Jun 2019 08:41)  fenofibrate 160 mg oral tablet: 1 tab(s) orally once a day (05 Jun 2019 08:41)  folic acid 1 mg oral tablet: 1 tab(s) orally once a day (05 Jun 2019 08:41)  glimepiride 2 mg oral tablet: 1 tab(s) orally once a day (05 Jun 2019 08:41)  Multiple Vitamins oral tablet: 1 tab(s) orally once a day (05 Jun 2019 08:41)  pravastatin 20 mg oral tablet: 1 tab(s) orally once a day (05 Jun 2019 08:41)  PreserVision AREDS 2 oral capsule: 1 cap(s) orally once a day (05 Jun 2019 08:41)  QUEtiapine 25 mg oral tablet: 0.5 tab(s) orally once a day (at bedtime) (05 Jun 2019 08:41)  thiamine 100 mg oral tablet: 1 tab(s) orally once a day (05 Jun 2019 08:41)  tiZANidine 4 mg oral capsule: 1 cap(s) orally 3 times a day, As Needed (05 Jun 2019 08:41)  traMADol 50 mg oral tablet: 1 tab(s) orally 3 times a day, As Needed (05 Jun 2019 08:41)  Vitamin B12 1000 mcg oral tablet: 1 tab(s) orally once a day (05 Jun 2019 08:41)  Vitamin D3 1000 intl units oral tablet: 1 tab(s) orally once a day (05 Jun 2019 08:41)  warfarin 5 mg oral tablet: 1 tab(s) orally 5 times a week  and takes  4  mg  twice weekly on  monday and thursday (05 Jun 2019 08:41)    Patient is a 87y old  Male who presents with a chief complaint of   HEALTH ISSUES - PROBLEM Dx:        General: No fatigue, no fevers/chills  Respiratory: No dyspnea, no cough, no wheeze  CV: No chest pain, no palpitations  Abd: No nausea  Neuro: No headache, no dizziness  No Known Allergies      Objective:  Vital Signs Last 24 Hrs  T(C): 36.8 (05 Jun 2019 08:28), Max: 36.8 (05 Jun 2019 08:28)  T(F): 98.2 (05 Jun 2019 08:28), Max: 98.2 (05 Jun 2019 08:28)  HR: 64 (05 Jun 2019 12:00) (64 - 69)  BP: 134/61 (05 Jun 2019 12:00) (133/67 - 151/75)  BP(mean): --  RR: 17 (05 Jun 2019 12:00) (16 - 18)  SpO2: 94% (05 Jun 2019 12:00) (93% - 94%)    CM: SR  Neuro: A&OX3, CN 2-12 intact  HEENT: NC, AT  Lungs: CTA B/L  CV: S1, S2, no murmur, RRR  Abd: Soft  Right Groin: Soft, no bleeding, no hematoma  Extremity: + distal pulses  EKG: no acute changes post Cath           PLAN     -post cardiac cath orders  -groin precautions  -continue current medical therapy  -DAPT (Coumadin and plavix)  -statin  -BB  -follow up outpt in 2 weeks  - plan for am discharge   - he will receive plavix load at 730 pm tonight   -resume coumadin tonight Department of Cardiology                                                                  [facility]/[place] [address]                                                            Telephone: [phone]. Fax:[phone]                                                                           Cardiac Catheterization Note       Narrative:  87y  Male is now s/p left heart catheterization via (right  groin) approach with Dr. Rasmussen which showed: Diag closure received 2 ELOY.     Admit for high bleeding risk due to antiplatelet and coumadin.          PAST MEDICAL & SURGICAL HISTORY:  PAF (paroxysmal atrial fibrillation)  CRI (chronic renal insufficiency)  Low back pain: chronic, receiving epidurals  TIA (transient ischemic attack)  HLD (hyperlipidemia)  HTN (hypertension)  DM (diabetes mellitus): diet controlled per pt  AICD (automatic cardioverter/defibrillator) present  Cardiomyopathy, ischemic  CAD (coronary artery disease)  Closed rib fracture  S/P cataract extraction  S/P hernia repair: inguinal  S/P tonsillectomy  S/P CABG x 3: 1996 Leia Enciso      Home Medications:  allopurinol 300 mg oral tablet: 1 tab(s) orally once a day (05 Jun 2019 08:41)  amLODIPine 5 mg oral tablet: 1 tab(s) orally once a day (05 Jun 2019 08:41)  Analpram-HC 1%-1% rectal cream: 1 applicatorful rectal 3 times a day, As Needed (05 Jun 2019 08:41)  aspirin 81 mg oral tablet: 1 tab(s) orally once a day (05 Jun 2019 08:41)  atorvastatin 40 mg oral tablet: 1 tab(s) orally once a day (05 Jun 2019 08:41)  Coreg 6.25 mg oral tablet: 1 tab(s) orally 2 times a day (05 Jun 2019 08:41)  enalapril 2.5 mg oral tablet: 1 tab(s) orally once a day (05 Jun 2019 08:41)  fenofibrate 160 mg oral tablet: 1 tab(s) orally once a day (05 Jun 2019 08:41)  folic acid 1 mg oral tablet: 1 tab(s) orally once a day (05 Jun 2019 08:41)  glimepiride 2 mg oral tablet: 1 tab(s) orally once a day (05 Jun 2019 08:41)  Multiple Vitamins oral tablet: 1 tab(s) orally once a day (05 Jun 2019 08:41)  pravastatin 20 mg oral tablet: 1 tab(s) orally once a day (05 Jun 2019 08:41)  PreserVision AREDS 2 oral capsule: 1 cap(s) orally once a day (05 Jun 2019 08:41)  QUEtiapine 25 mg oral tablet: 0.5 tab(s) orally once a day (at bedtime) (05 Jun 2019 08:41)  thiamine 100 mg oral tablet: 1 tab(s) orally once a day (05 Jun 2019 08:41)  tiZANidine 4 mg oral capsule: 1 cap(s) orally 3 times a day, As Needed (05 Jun 2019 08:41)  traMADol 50 mg oral tablet: 1 tab(s) orally 3 times a day, As Needed (05 Jun 2019 08:41)  Vitamin B12 1000 mcg oral tablet: 1 tab(s) orally once a day (05 Jun 2019 08:41)  Vitamin D3 1000 intl units oral tablet: 1 tab(s) orally once a day (05 Jun 2019 08:41)  warfarin 5 mg oral tablet: 1 tab(s) orally 5 times a week  and takes  4  mg  twice weekly on  monday and thursday (05 Jun 2019 08:41)    Patient is a 87y old  Male who presents with a chief complaint of   HEALTH ISSUES - PROBLEM Dx:        General: No fatigue, no fevers/chills  Respiratory: No dyspnea, no cough, no wheeze  CV: No chest pain, no palpitations  Abd: No nausea  Neuro: No headache, no dizziness  No Known Allergies      Objective:  Vital Signs Last 24 Hrs  T(C): 36.8 (05 Jun 2019 08:28), Max: 36.8 (05 Jun 2019 08:28)  T(F): 98.2 (05 Jun 2019 08:28), Max: 98.2 (05 Jun 2019 08:28)  HR: 64 (05 Jun 2019 12:00) (64 - 69)  BP: 134/61 (05 Jun 2019 12:00) (133/67 - 151/75)  BP(mean): --  RR: 17 (05 Jun 2019 12:00) (16 - 18)  SpO2: 94% (05 Jun 2019 12:00) (93% - 94%)    CM: SR  Neuro: A&OX3, CN 2-12 intact  HEENT: NC, AT  Lungs: CTA B/L  CV: S1, S2, no murmur, RRR  Abd: Soft  Right Groin: Soft, no bleeding, no hematoma  Extremity: + distal pulses  EKG: no acute changes post Cath           PLAN     -post cardiac cath orders  -groin precautions  -continue current medical therapy  -DAPT (Coumadin and plavix)  -statin  -BB  -follow up outpt in 2 weeks  - plan for am discharge   - he will receive plavix load at 730 pm tonight   -resume coumadin tonight

## 2019-06-05 NOTE — PROGRESS NOTE ADULT - SUBJECTIVE AND OBJECTIVE BOX
Patient is a 87y old  Male who presents with a chief complaint of     HPI:  87 year old man with a history of coronary disease S/P MI in 1973, atrial fibrillation history with CVA on coumadin and 3V CABG in 1996 at [facility] and [facility] ([facility]). He tells me that his ICD has never shocked him.   Today he tells me that he has been getting SOB with exertion for the past few mnths, this is new for him. Yesterday at his granddaughters graduation he could not keep up with everyone. He denies CP, diaphoresis, palpitations, dizziness, syncope, LE edema, PND.  Of note anginal symptoms were "feeling exhausted"  Echocardiogram 3/2018 showed new mod-sev LV dysfunction, mod AI, mils TR.  Nuclear stress test 3/2018 showed med sized, mod-sev defects in basal to mid lateral and inferior walls which were partially reversible         PAST MEDICAL & SURGICAL HISTORY:  PAF (paroxysmal atrial fibrillation)  CRI (chronic renal insufficiency)  Low back pain: chronic, receiving epidurals  TIA (transient ischemic attack)  HLD (hyperlipidemia)  HTN (hypertension)  DM (diabetes mellitus): diet controlled per pt  AICD (automatic cardioverter/defibrillator) present  Cardiomyopathy, ischemic  CAD (coronary artery disease)  Closed rib fracture  S/P cataract extraction  S/P hernia repair: inguinal  S/P tonsillectomy  S/P CABG x 3: 1996 [facility] Dr Enciso      Allergies  No Known Allergies  	    Vital Signs Last 24 Hrs  T(C): 36.8 (05 Jun 2019 08:28), Max: 36.8 (05 Jun 2019 08:28)  T(F): 98.2 (05 Jun 2019 08:28), Max: 98.2 (05 Jun 2019 08:28)  HR: 64 (05 Jun 2019 08:28) (64 - 64)  BP: 133/67 (05 Jun 2019 08:28) (133/67 - 133/67)    RR: 18 (05 Jun 2019 08:28) (18 - 18)  SpO2: 94% (05 Jun 2019 08:28) (94% - 94%)    I&O's Detail      PHYSICAL EXAM:  Appearance: Normal, well nourished	  HEENT:   Normal oral mucosa, PERRL, EOMI, sclera non-icteric	  Cardiovascular: Normal S1 S2, No JVD, No cardiac murmurs, No carotid bruits, No peripheral edema  Respiratory: Lungs clear to auscultation	  Psychiatry: A & O x 3, Mood & affect appropriate  Skin: No rashes, No ecchymoses, No cyanosis  Neurologic: Grossly non-focal with full strength in all four extremities  Extremities: Normal range of motion, No clubbing, cyanosis or edema  Vascular: Peripheral pulses palpable 2+ bilaterally      INTERPRETATION OF TELEMETRY:  Sinus Rhythm    LABS: INR today 1.4        Assessment and Plan    CAD with history of CABG and ICD, a moderate risk stress test in 2018, no symptomatic with c/o increased SOB  Last dose coumadin on Saturday 6/1/2019    ASA/Mallampati 2/2  Bleeding risk score 4.0%    Plan: [facility] to assess for new blockages

## 2019-06-06 ENCOUNTER — TRANSCRIPTION ENCOUNTER (OUTPATIENT)
Age: 84
End: 2019-06-06

## 2019-06-06 VITALS
RESPIRATION RATE: 18 BRPM | OXYGEN SATURATION: 96 % | SYSTOLIC BLOOD PRESSURE: 114 MMHG | HEART RATE: 69 BPM | DIASTOLIC BLOOD PRESSURE: 71 MMHG

## 2019-06-06 LAB
ALBUMIN SERPL ELPH-MCNC: 4.3 G/DL — SIGNIFICANT CHANGE UP (ref 3.3–5.2)
ALP SERPL-CCNC: 67 U/L — SIGNIFICANT CHANGE UP (ref 40–120)
ALT FLD-CCNC: 19 U/L — SIGNIFICANT CHANGE UP
ANION GAP SERPL CALC-SCNC: 11 MMOL/L — SIGNIFICANT CHANGE UP (ref 5–17)
AST SERPL-CCNC: 23 U/L — SIGNIFICANT CHANGE UP
BASOPHILS # BLD AUTO: 0 K/UL — SIGNIFICANT CHANGE UP (ref 0–0.2)
BASOPHILS NFR BLD AUTO: 0.5 % — SIGNIFICANT CHANGE UP (ref 0–2)
BILIRUB SERPL-MCNC: 0.7 MG/DL — SIGNIFICANT CHANGE UP (ref 0.4–2)
BUN SERPL-MCNC: 29 MG/DL — HIGH (ref 8–20)
CALCIUM SERPL-MCNC: 10.1 MG/DL — SIGNIFICANT CHANGE UP (ref 8.6–10.2)
CHLORIDE SERPL-SCNC: 105 MMOL/L — SIGNIFICANT CHANGE UP (ref 98–107)
CO2 SERPL-SCNC: 23 MMOL/L — SIGNIFICANT CHANGE UP (ref 22–29)
CREAT SERPL-MCNC: 1.15 MG/DL — SIGNIFICANT CHANGE UP (ref 0.5–1.3)
EOSINOPHIL # BLD AUTO: 0.2 K/UL — SIGNIFICANT CHANGE UP (ref 0–0.5)
EOSINOPHIL NFR BLD AUTO: 5.6 % — HIGH (ref 0–5)
GLUCOSE SERPL-MCNC: 117 MG/DL — HIGH (ref 70–115)
HCT VFR BLD CALC: 41.9 % — LOW (ref 42–52)
HGB BLD-MCNC: 13.7 G/DL — LOW (ref 14–18)
LYMPHOCYTES # BLD AUTO: 1.3 K/UL — SIGNIFICANT CHANGE UP (ref 1–4.8)
LYMPHOCYTES # BLD AUTO: 30.8 % — SIGNIFICANT CHANGE UP (ref 20–55)
MCHC RBC-ENTMCNC: 28.8 PG — SIGNIFICANT CHANGE UP (ref 27–31)
MCHC RBC-ENTMCNC: 32.7 G/DL — SIGNIFICANT CHANGE UP (ref 32–36)
MCV RBC AUTO: 88.2 FL — SIGNIFICANT CHANGE UP (ref 80–94)
MONOCYTES # BLD AUTO: 0.5 K/UL — SIGNIFICANT CHANGE UP (ref 0–0.8)
MONOCYTES NFR BLD AUTO: 11.4 % — HIGH (ref 3–10)
NEUTROPHILS # BLD AUTO: 2.2 K/UL — SIGNIFICANT CHANGE UP (ref 1.8–8)
NEUTROPHILS NFR BLD AUTO: 51.5 % — SIGNIFICANT CHANGE UP (ref 37–73)
PLATELET # BLD AUTO: 149 K/UL — LOW (ref 150–400)
POTASSIUM SERPL-MCNC: 4.5 MMOL/L — SIGNIFICANT CHANGE UP (ref 3.5–5.3)
POTASSIUM SERPL-SCNC: 4.5 MMOL/L — SIGNIFICANT CHANGE UP (ref 3.5–5.3)
PROT SERPL-MCNC: 7 G/DL — SIGNIFICANT CHANGE UP (ref 6.6–8.7)
RBC # BLD: 4.75 M/UL — SIGNIFICANT CHANGE UP (ref 4.6–6.2)
RBC # FLD: 13.7 % — SIGNIFICANT CHANGE UP (ref 11–15.6)
SODIUM SERPL-SCNC: 139 MMOL/L — SIGNIFICANT CHANGE UP (ref 135–145)
WBC # BLD: 4.3 K/UL — LOW (ref 4.8–10.8)
WBC # FLD AUTO: 4.3 K/UL — LOW (ref 4.8–10.8)

## 2019-06-06 PROCEDURE — 85730 THROMBOPLASTIN TIME PARTIAL: CPT

## 2019-06-06 PROCEDURE — 86900 BLOOD TYPING SEROLOGIC ABO: CPT

## 2019-06-06 PROCEDURE — C1769: CPT

## 2019-06-06 PROCEDURE — 86850 RBC ANTIBODY SCREEN: CPT

## 2019-06-06 PROCEDURE — 99152 MOD SED SAME PHYS/QHP 5/>YRS: CPT

## 2019-06-06 PROCEDURE — 76937 US GUIDE VASCULAR ACCESS: CPT

## 2019-06-06 PROCEDURE — C1894: CPT

## 2019-06-06 PROCEDURE — 85027 COMPLETE CBC AUTOMATED: CPT

## 2019-06-06 PROCEDURE — 99153 MOD SED SAME PHYS/QHP EA: CPT

## 2019-06-06 PROCEDURE — 86901 BLOOD TYPING SEROLOGIC RH(D): CPT

## 2019-06-06 PROCEDURE — 93459 L HRT ART/GRFT ANGIO: CPT | Mod: XU

## 2019-06-06 PROCEDURE — 93567 NJX CAR CTH SPRVLV AORTGRPHY: CPT

## 2019-06-06 PROCEDURE — 93005 ELECTROCARDIOGRAM TRACING: CPT

## 2019-06-06 PROCEDURE — C9600: CPT | Mod: LD

## 2019-06-06 PROCEDURE — C1760: CPT

## 2019-06-06 PROCEDURE — 93010 ELECTROCARDIOGRAM REPORT: CPT

## 2019-06-06 PROCEDURE — 36415 COLL VENOUS BLD VENIPUNCTURE: CPT

## 2019-06-06 PROCEDURE — C1887: CPT

## 2019-06-06 PROCEDURE — 94640 AIRWAY INHALATION TREATMENT: CPT

## 2019-06-06 PROCEDURE — 82962 GLUCOSE BLOOD TEST: CPT

## 2019-06-06 PROCEDURE — 85610 PROTHROMBIN TIME: CPT

## 2019-06-06 PROCEDURE — C1874: CPT

## 2019-06-06 PROCEDURE — C1725: CPT

## 2019-06-06 PROCEDURE — 80053 COMPREHEN METABOLIC PANEL: CPT

## 2019-06-06 RX ORDER — ASPIRIN/CALCIUM CARB/MAGNESIUM 324 MG
1 TABLET ORAL
Qty: 90 | Refills: 3
Start: 2019-06-06 | End: 2020-05-30

## 2019-06-06 RX ORDER — CLOPIDOGREL BISULFATE 75 MG/1
1 TABLET, FILM COATED ORAL
Qty: 90 | Refills: 0
Start: 2019-06-06 | End: 2019-09-03

## 2019-06-06 RX ADMIN — CARVEDILOL PHOSPHATE 6.25 MILLIGRAM(S): 80 CAPSULE, EXTENDED RELEASE ORAL at 05:15

## 2019-06-06 RX ADMIN — AMLODIPINE BESYLATE 5 MILLIGRAM(S): 2.5 TABLET ORAL at 05:15

## 2019-06-06 RX ADMIN — Medication 2.5 MILLIGRAM(S): at 05:14

## 2019-06-06 NOTE — DISCHARGE NOTE NURSING/CASE MANAGEMENT/SOCIAL WORK - NSDCFUADDAPPT_GEN_ALL_CORE_FT
Check Coumadin level on friday maintain between 2-3   Also check kidney function same day with Dr Baptiste    No heavy lifting, driving, sex, tub baths, swimming, or any activity that submerges the lower half of the body in water for 48 hours.  Limited walking and stairs for 48 hours.    Change the bandaid after 24 hours and every 24 hours after that.  Keep the puncture site dry and covered with a bandaid until a scab forms.      Observe the site frequently.  If bleeding or a large lump (the size of a golf ball or bigger) occurs lie flat, apply continuous direct pressure just above the puncture site for at least 10 minutes, and notify your physician immediately.  If the bleeding cannot be controlled, call 911 immediately for assistance.  Notify your physician of pain, swelling or any drainage.      Notify your physician immediately if coldness, numbness, discoloration or pain in your foot occurs.    PLEASE RECONSIDER GETTING A LIFEALERT AS YOU LIVE ALONE

## 2019-06-06 NOTE — DISCHARGE NOTE NURSING/CASE MANAGEMENT/SOCIAL WORK - NSDCDPATPORTLINK_GEN_ALL_CORE
You can access the [facility], offered by [facility], by registering with the following website: [email or web address]

## 2019-06-12 ENCOUNTER — APPOINTMENT (OUTPATIENT)
Dept: CARDIOLOGY | Facility: CLINIC | Age: 84
End: 2019-06-12
Payer: MEDICARE

## 2019-06-12 ENCOUNTER — NON-APPOINTMENT (OUTPATIENT)
Age: 84
End: 2019-06-12

## 2019-06-12 ENCOUNTER — RX RENEWAL (OUTPATIENT)
Age: 84
End: 2019-06-12

## 2019-06-12 VITALS
HEIGHT: 69 IN | DIASTOLIC BLOOD PRESSURE: 68 MMHG | HEART RATE: 70 BPM | BODY MASS INDEX: 28.58 KG/M2 | SYSTOLIC BLOOD PRESSURE: 144 MMHG | OXYGEN SATURATION: 94 % | WEIGHT: 193 LBS

## 2019-06-12 PROCEDURE — 93000 ELECTROCARDIOGRAM COMPLETE: CPT

## 2019-06-12 PROCEDURE — 99214 OFFICE O/P EST MOD 30 MIN: CPT | Mod: 25

## 2019-06-12 NOTE — REASON FOR VISIT
[Follow-Up - Clinic] : a clinic follow-up of [Cardiomyopathy] : cardiomyopathy [Dyspnea] : dyspnea [Coronary Artery Disease] : coronary artery disease

## 2019-06-16 ENCOUNTER — RX RENEWAL (OUTPATIENT)
Age: 84
End: 2019-06-16

## 2019-06-17 ENCOUNTER — APPOINTMENT (OUTPATIENT)
Dept: CARDIOLOGY | Facility: CLINIC | Age: 84
End: 2019-06-17
Payer: MEDICARE

## 2019-06-17 PROCEDURE — 85610 PROTHROMBIN TIME: CPT | Mod: QW

## 2019-06-17 PROCEDURE — 99211 OFF/OP EST MAY X REQ PHY/QHP: CPT

## 2019-06-17 PROCEDURE — 93306 TTE W/DOPPLER COMPLETE: CPT

## 2019-06-20 ENCOUNTER — NON-APPOINTMENT (OUTPATIENT)
Age: 84
End: 2019-06-20

## 2019-06-20 NOTE — PHYSICAL EXAM
[General Appearance - Well Developed] : well developed [Normal Appearance] : normal appearance [General Appearance - Well Nourished] : well nourished [Well Groomed] : well groomed [Normal Conjunctiva] : the conjunctiva exhibited no abnormalities [General Appearance - In No Acute Distress] : no acute distress [No Oral Cyanosis] : no oral cyanosis [No Oral Pallor] : no oral pallor [No Jugular Venous Cook A Waves] : no jugular venous cook A waves [Normal Jugular Venous V Waves Present] : normal jugular venous V waves present [Respiration, Rhythm And Depth] : normal respiratory rhythm and effort [Exaggerated Use Of Accessory Muscles For Inspiration] : no accessory muscle use [Auscultation Breath Sounds / Voice Sounds] : lungs were clear to auscultation bilaterally [Heart Sounds] : normal S1 and S2 [Arterial Pulses Normal] : the arterial pulses were normal [Heart Rate And Rhythm] : heart rate and rhythm were normal [Edema] : no peripheral edema present [Systolic grade ___/6] : A grade [unfilled]/6 systolic murmur was heard. [Abdomen Tenderness] : non-tender [Abdomen Soft] : soft [Nail Clubbing] : no clubbing of the fingernails [Skin Turgor] : normal skin turgor [Cyanosis, Localized] : no localized cyanosis [Abnormal Walk] : normal gait [Oriented To Time, Place, And Person] : oriented to person, place, and time [] : no rash [Affect] : the affect was normal [Impaired Insight] : insight and judgment were intact [Memory Recent] : recent memory was not impaired [FreeTextEntry1] : no JVD or bruits

## 2019-06-20 NOTE — REVIEW OF SYSTEMS
[Eyeglasses] : currently wearing eyeglasses [Dyspnea on exertion] : dyspnea during exertion [see HPI] : see HPI [Joint Pain] : joint pain [Negative] : Heme/Lymph [Recent Weight Loss (___ Lbs)] : no recent weight loss [Blurry Vision] : no blurred vision [Chest  Pressure] : no chest pressure [Shortness Of Breath] : no shortness of breath [Chest Pain] : no chest pain [Lower Ext Edema] : no extremity edema [Leg Claudication] : no intermittent leg claudication [Palpitations] : no palpitations [Muscle Cramps] : no muscle cramps

## 2019-07-01 ENCOUNTER — APPOINTMENT (OUTPATIENT)
Dept: FAMILY MEDICINE | Facility: CLINIC | Age: 84
End: 2019-07-01
Payer: MEDICARE

## 2019-07-01 VITALS
WEIGHT: 195 LBS | BODY MASS INDEX: 28.88 KG/M2 | DIASTOLIC BLOOD PRESSURE: 52 MMHG | HEIGHT: 69 IN | SYSTOLIC BLOOD PRESSURE: 100 MMHG | OXYGEN SATURATION: 96 % | HEART RATE: 71 BPM | RESPIRATION RATE: 16 BRPM

## 2019-07-01 VITALS — SYSTOLIC BLOOD PRESSURE: 110 MMHG | DIASTOLIC BLOOD PRESSURE: 70 MMHG | HEART RATE: 72 BPM | RESPIRATION RATE: 16 BRPM

## 2019-07-01 DIAGNOSIS — Z00.00 ENCOUNTER FOR GENERAL ADULT MEDICAL EXAMINATION W/OUT ABNORMAL FINDINGS: ICD-10-CM

## 2019-07-01 LAB
INR PPP: 1.5 RATIO
INR PPP: 2.3 RATIO
INR PPP: 2.6 RATIO
INR PPP: 3 RATIO
POCT-PROTHROMBIN TIME: 18.3 SECS

## 2019-07-01 PROCEDURE — 85610 PROTHROMBIN TIME: CPT | Mod: QW

## 2019-07-01 PROCEDURE — 99214 OFFICE O/P EST MOD 30 MIN: CPT | Mod: 25

## 2019-07-01 NOTE — REVIEW OF SYSTEMS
[Dyspnea on Exertion] : dyspnea on exertion [Diarrhea] : diarrhea [Chest Pain] : no chest pain [Shortness Of Breath] : no shortness of breath [Itching] : no itching [Dizziness] : no dizziness [FreeTextEntry2] : fatigue  and  sob  less  [FreeTextEntry7] : diarrhea  since  stating plavix

## 2019-07-01 NOTE — ASSESSMENT
[FreeTextEntry1] : dm  well controlled  will decrease glimeperide to 1 mg henrique may be  dc medically stable  continue all meds\par

## 2019-07-01 NOTE — HISTORY OF PRESENT ILLNESS
[Coronary Artery Disease] : Coronary Artery Disease [Diabetes Mellitus] : Diabetes Mellitus [Hyperlipidemia] : Hyperlipidemia [Hypertension] : Hypertension [Other: ___] : [unfilled] [Check glucose ___ x/day] : Patient checks  blood glucose [unfilled]  times a day [Most Recent A1C: ___] : Most recent A1C was [unfilled] [<130/90] : Target blood pressure is  <130/90 [Target goal met] : BP target goal met [Doing Well] : Patient is doing well [Managed with medications] : managed with  medication [Low Intensity Therapy] : Patient is currently on low intensity statin  therapy [Systolic] : systolic [With moderate physical activity] : Shortness of breath with moderate physical activity [Responding to Treatment] : The condition is responding to treatment [FreeTextEntry6] : had  angioplasty 3  wks  ago feeling well having  some  diarrhea for  3 wks  since  angaioplasty  [de-identified] : [address]  [Symptoms Limit Activities] : Symptoms do not limit ~his/her~ activities

## 2019-07-01 NOTE — DATA REVIEWED
[FreeTextEntry1] : inr  3.0 same dose do not take  any supplements unless  checked eat consistent diet\par

## 2019-07-02 LAB
ALBUMIN SERPL ELPH-MCNC: 4.5 G/DL
ALP BLD-CCNC: 73 U/L
ALT SERPL-CCNC: 18 U/L
ANION GAP SERPL CALC-SCNC: 10 MMOL/L
AST SERPL-CCNC: 22 U/L
BASOPHILS # BLD AUTO: 0.04 K/UL
BASOPHILS NFR BLD AUTO: 0.9 %
BILIRUB SERPL-MCNC: 0.4 MG/DL
BUN SERPL-MCNC: 29 MG/DL
CALCIUM SERPL-MCNC: 9.5 MG/DL
CHLORIDE SERPL-SCNC: 109 MMOL/L
CHOLEST SERPL-MCNC: 111 MG/DL
CHOLEST/HDLC SERPL: 2.9 RATIO
CO2 SERPL-SCNC: 24 MMOL/L
CREAT SERPL-MCNC: 1.3 MG/DL
EOSINOPHIL # BLD AUTO: 0.31 K/UL
EOSINOPHIL NFR BLD AUTO: 7 %
ESTIMATED AVERAGE GLUCOSE: 114 MG/DL
GLUCOSE SERPL-MCNC: 143 MG/DL
HBA1C MFR BLD HPLC: 5.6 %
HCT VFR BLD CALC: 44 %
HDLC SERPL-MCNC: 38 MG/DL
HGB BLD-MCNC: 13.2 G/DL
IMM GRANULOCYTES NFR BLD AUTO: 0.2 %
LDLC SERPL CALC-MCNC: 54 MG/DL
LYMPHOCYTES # BLD AUTO: 1.3 K/UL
LYMPHOCYTES NFR BLD AUTO: 29.5 %
MAN DIFF?: NORMAL
MCHC RBC-ENTMCNC: 28.9 PG
MCHC RBC-ENTMCNC: 30 GM/DL
MCV RBC AUTO: 96.3 FL
MONOCYTES # BLD AUTO: 0.58 K/UL
MONOCYTES NFR BLD AUTO: 13.2 %
NEUTROPHILS # BLD AUTO: 2.17 K/UL
NEUTROPHILS NFR BLD AUTO: 49.2 %
PLATELET # BLD AUTO: 145 K/UL
POTASSIUM SERPL-SCNC: 5.7 MMOL/L
PROT SERPL-MCNC: 6.7 G/DL
RBC # BLD: 4.57 M/UL
RBC # FLD: 14 %
SODIUM SERPL-SCNC: 143 MMOL/L
TRIGL SERPL-MCNC: 95 MG/DL
URATE SERPL-MCNC: 4.6 MG/DL
WBC # FLD AUTO: 4.41 K/UL

## 2019-07-07 ENCOUNTER — RX RENEWAL (OUTPATIENT)
Age: 84
End: 2019-07-07

## 2019-07-07 LAB
APPEARANCE: CLEAR
BILIRUBIN URINE: NEGATIVE
BLOOD URINE: NEGATIVE
C DIFF TOX GENS STL QL NAA+PROBE: NORMAL
CDIFF BY PCR: DETECTED
COLOR: NORMAL
CREAT SPEC-SCNC: 37 MG/DL
GLUCOSE QUALITATIVE U: NEGATIVE
KETONES URINE: NEGATIVE
LEUKOCYTE ESTERASE URINE: NEGATIVE
MICROALBUMIN 24H UR DL<=1MG/L-MCNC: <1.2 MG/DL
MICROALBUMIN/CREAT 24H UR-RTO: NORMAL MG/G
NITRITE URINE: NEGATIVE
PH URINE: 6.5
PROTEIN URINE: NEGATIVE
SPECIFIC GRAVITY URINE: 1.01
UROBILINOGEN URINE: NORMAL

## 2019-07-12 ENCOUNTER — APPOINTMENT (OUTPATIENT)
Dept: ELECTROPHYSIOLOGY | Facility: CLINIC | Age: 84
End: 2019-07-12

## 2019-07-15 ENCOUNTER — NON-APPOINTMENT (OUTPATIENT)
Age: 84
End: 2019-07-15

## 2019-07-15 ENCOUNTER — APPOINTMENT (OUTPATIENT)
Dept: CARDIOLOGY | Facility: CLINIC | Age: 84
End: 2019-07-15
Payer: MEDICARE

## 2019-07-15 VITALS
BODY MASS INDEX: 28.29 KG/M2 | OXYGEN SATURATION: 95 % | DIASTOLIC BLOOD PRESSURE: 54 MMHG | WEIGHT: 191 LBS | HEART RATE: 70 BPM | HEIGHT: 69 IN | SYSTOLIC BLOOD PRESSURE: 115 MMHG

## 2019-07-15 PROCEDURE — 99214 OFFICE O/P EST MOD 30 MIN: CPT | Mod: 25

## 2019-07-15 PROCEDURE — 93000 ELECTROCARDIOGRAM COMPLETE: CPT

## 2019-07-15 NOTE — REVIEW OF SYSTEMS
[Recent Weight Loss (___ Lbs)] : no recent weight loss [Blurry Vision] : no blurred vision [Eyeglasses] : currently wearing eyeglasses [Shortness Of Breath] : no shortness of breath [Dyspnea on exertion] : not dyspnea during exertion [Chest  Pressure] : no chest pressure [Chest Pain] : no chest pain [Lower Ext Edema] : no extremity edema [Leg Claudication] : no intermittent leg claudication [Palpitations] : no palpitations [see HPI] : see HPI [Joint Pain] : joint pain [Muscle Cramps] : no muscle cramps [Negative] : Heme/Lymph

## 2019-07-15 NOTE — PHYSICAL EXAM
[General Appearance - Well Developed] : well developed [Normal Appearance] : normal appearance [Well Groomed] : well groomed [General Appearance - Well Nourished] : well nourished [General Appearance - In No Acute Distress] : no acute distress [Normal Conjunctiva] : the conjunctiva exhibited no abnormalities [No Oral Pallor] : no oral pallor [No Oral Cyanosis] : no oral cyanosis [Normal Jugular Venous V Waves Present] : normal jugular venous V waves present [No Jugular Venous Cook A Waves] : no jugular venous cook A waves [FreeTextEntry1] : no JVD or bruits [Respiration, Rhythm And Depth] : normal respiratory rhythm and effort [Exaggerated Use Of Accessory Muscles For Inspiration] : no accessory muscle use [Auscultation Breath Sounds / Voice Sounds] : lungs were clear to auscultation bilaterally [Heart Rate And Rhythm] : heart rate and rhythm were normal [Heart Sounds] : normal S1 and S2 [Arterial Pulses Normal] : the arterial pulses were normal [Edema] : no peripheral edema present [Systolic grade ___/6] : A grade [unfilled]/6 systolic murmur was heard. [Abdomen Soft] : soft [Abdomen Tenderness] : non-tender [Abnormal Walk] : normal gait [Nail Clubbing] : no clubbing of the fingernails [Cyanosis, Localized] : no localized cyanosis [Skin Turgor] : normal skin turgor [] : no rash [Oriented To Time, Place, And Person] : oriented to person, place, and time [Impaired Insight] : insight and judgment were intact [Affect] : the affect was normal [Memory Recent] : recent memory was not impaired

## 2019-07-20 LAB
C DIFF TOX GENS STL QL NAA+PROBE: NORMAL
CDIFF BY PCR: DETECTED
INR PPP: 1.77 RATIO
PT BLD: 20.4 SEC

## 2019-07-22 ENCOUNTER — APPOINTMENT (OUTPATIENT)
Dept: FAMILY MEDICINE | Facility: CLINIC | Age: 84
End: 2019-07-22
Payer: MEDICARE

## 2019-07-22 VITALS
HEIGHT: 69 IN | WEIGHT: 189 LBS | BODY MASS INDEX: 27.99 KG/M2 | SYSTOLIC BLOOD PRESSURE: 118 MMHG | DIASTOLIC BLOOD PRESSURE: 68 MMHG | HEART RATE: 74 BPM | OXYGEN SATURATION: 96 %

## 2019-07-22 VITALS — DIASTOLIC BLOOD PRESSURE: 80 MMHG | RESPIRATION RATE: 16 BRPM | SYSTOLIC BLOOD PRESSURE: 110 MMHG | HEART RATE: 72 BPM

## 2019-07-22 DIAGNOSIS — F32.9 MAJOR DEPRESSIVE DISORDER, SINGLE EPISODE, UNSPECIFIED: ICD-10-CM

## 2019-07-22 PROCEDURE — 99214 OFFICE O/P EST MOD 30 MIN: CPT

## 2019-07-22 NOTE — REVIEW OF SYSTEMS
[Chest Pain] : no chest pain [Palpitations] : no palpitations [Shortness Of Breath] : no shortness of breath [Abdominal Pain] : no abdominal pain [Nausea] : no nausea [Constipation] : no constipation [Dysuria] : no dysuria [Joint Pain] : no joint pain [Dizziness] : no dizziness [Headache] : no headache Former smoker

## 2019-07-22 NOTE — HISTORY OF PRESENT ILLNESS
[de-identified] : finished  flagy  but  still has  diarrhea stool   post  for  c diff still started on vanco  yesterday   testing  glucose  daily never  higher than 120 doews not  check bp but was at goal  118/70  [FreeTextEntry1] : f/u on c-diff af  dm

## 2019-07-22 NOTE — ASSESSMENT
[FreeTextEntry1] : d-diff  will teat with with vanco  for  10 days  the  daily for several mos medically stable  continue all meds\par side  affects and  expected  results  of medication were  discussed  with patient and all  questions  were  answered and patient  agrees with plan\par

## 2019-07-22 NOTE — PHYSICAL EXAM
[No Acute Distress] : no acute distress [PERRL] : pupils equal round and reactive to light [Normal Oropharynx] : the oropharynx was normal [Ill-Appearing] : ill-appearing [No Accessory Muscle Use] : no accessory muscle use [No Lymphadenopathy] : no lymphadenopathy [Regular Rhythm] : with a regular rhythm [No Edema] : there was no peripheral edema [Normal] : no joint swelling and grossly normal strength and tone

## 2019-07-31 ENCOUNTER — APPOINTMENT (OUTPATIENT)
Dept: FAMILY MEDICINE | Facility: CLINIC | Age: 84
End: 2019-07-31
Payer: MEDICARE

## 2019-07-31 VITALS — DIASTOLIC BLOOD PRESSURE: 70 MMHG | RESPIRATION RATE: 16 BRPM | HEART RATE: 72 BPM | SYSTOLIC BLOOD PRESSURE: 90 MMHG

## 2019-07-31 VITALS
BODY MASS INDEX: 27.88 KG/M2 | TEMPERATURE: 97.6 F | HEART RATE: 71 BPM | DIASTOLIC BLOOD PRESSURE: 58 MMHG | SYSTOLIC BLOOD PRESSURE: 100 MMHG | HEIGHT: 69 IN | OXYGEN SATURATION: 98 % | WEIGHT: 188.25 LBS

## 2019-07-31 PROCEDURE — 85610 PROTHROMBIN TIME: CPT | Mod: QW

## 2019-07-31 PROCEDURE — 99214 OFFICE O/P EST MOD 30 MIN: CPT | Mod: 25

## 2019-07-31 PROCEDURE — G0444 DEPRESSION SCREEN ANNUAL: CPT

## 2019-07-31 RX ORDER — GLIMEPIRIDE 2 MG/1
2 TABLET ORAL DAILY
Qty: 90 | Refills: 1 | Status: DISCONTINUED | COMMUNITY
Start: 2017-10-07 | End: 2019-07-31

## 2019-07-31 NOTE — REVIEW OF SYSTEMS
[Diarrhea] : diarrhea [Fever] : no fever [Abdominal Pain] : no abdominal pain [Dizziness] : no dizziness

## 2019-07-31 NOTE — PHYSICAL EXAM
[No Acute Distress] : no acute distress [Normal Voice/Communication] : normal voice/communication [PERRL] : pupils equal round and reactive to light [Normal Oropharynx] : the oropharynx was normal [No Lymphadenopathy] : no lymphadenopathy [Regular Rhythm] : with a regular rhythm [Clear to Auscultation] : lungs were clear to auscultation bilaterally [Non Tender] : non-tender [No Edema] : there was no peripheral edema [Soft] : abdomen soft [No HSM] : no HSM [Normal Posterior Cervical Nodes] : no posterior cervical lymphadenopathy [Normal Supraclavicular Nodes] : no supraclavicular lymphadenopathy [No Rash] : no rash [Normal Anterior Cervical Nodes] : no anterior cervical lymphadenopathy [Normal Gait] : normal gait

## 2019-07-31 NOTE — HISTORY OF PRESENT ILLNESS
[Hyperlipidemia] : Hyperlipidemia [Diabetes Mellitus] : Diabetes Mellitus [Hypertension] : Hypertension [Other: ___] : [unfilled] [No episodes] : No hypoglycemic episodes since the last visit. [Check glucose ___ x/day] : Patient checks  blood glucose [unfilled]  times a day [Most Recent A1C: ___] : Most recent A1C was [unfilled] [Does not check BP] : The patient is not checking blood pressure [120/80] : Target blood pressure is 120/80 [Target goal met] : BP target goal met [Doing Well] : Patient is doing well [Managed with medications] : managed with  medication [High Intensity therapy] : Patient is currently on high intensity statin therapy [FreeTextEntry6] : diarrhea  slowing  down still having  frequent bm  no  temp

## 2019-07-31 NOTE — ASSESSMENT
[FreeTextEntry1] : will try fidaxomicin if  not  successful  can  try rifamycin 400 mg tid  for 20 days off  label

## 2019-08-04 ENCOUNTER — RX RENEWAL (OUTPATIENT)
Age: 84
End: 2019-08-04

## 2019-08-11 ENCOUNTER — RX RENEWAL (OUTPATIENT)
Age: 84
End: 2019-08-11

## 2019-08-26 ENCOUNTER — APPOINTMENT (OUTPATIENT)
Dept: FAMILY MEDICINE | Facility: CLINIC | Age: 84
End: 2019-08-26
Payer: MEDICARE

## 2019-08-26 VITALS
DIASTOLIC BLOOD PRESSURE: 60 MMHG | SYSTOLIC BLOOD PRESSURE: 122 MMHG | BODY MASS INDEX: 28.14 KG/M2 | OXYGEN SATURATION: 97 % | WEIGHT: 190 LBS | HEIGHT: 69 IN | HEART RATE: 63 BPM

## 2019-08-26 PROCEDURE — 99214 OFFICE O/P EST MOD 30 MIN: CPT

## 2019-08-26 NOTE — HISTORY OF PRESENT ILLNESS
[FreeTextEntry8] : chronic cdif improved but not resolved no fever finished vanco last week has not seen gi or ID

## 2019-08-28 ENCOUNTER — RX RENEWAL (OUTPATIENT)
Age: 84
End: 2019-08-28

## 2019-09-10 ENCOUNTER — APPOINTMENT (OUTPATIENT)
Dept: ELECTROPHYSIOLOGY | Facility: CLINIC | Age: 84
End: 2019-09-10

## 2019-09-18 ENCOUNTER — APPOINTMENT (OUTPATIENT)
Dept: FAMILY MEDICINE | Facility: CLINIC | Age: 84
End: 2019-09-18
Payer: MEDICARE

## 2019-09-18 VITALS
HEART RATE: 64 BPM | BODY MASS INDEX: 28.6 KG/M2 | SYSTOLIC BLOOD PRESSURE: 138 MMHG | TEMPERATURE: 97.9 F | HEIGHT: 69 IN | WEIGHT: 193.13 LBS | DIASTOLIC BLOOD PRESSURE: 60 MMHG | OXYGEN SATURATION: 98 %

## 2019-09-18 VITALS — HEART RATE: 72 BPM | RESPIRATION RATE: 16 BRPM | SYSTOLIC BLOOD PRESSURE: 130 MMHG | DIASTOLIC BLOOD PRESSURE: 80 MMHG

## 2019-09-18 LAB
INR PPP: 2.1 RATIO
INR PPP: 2.4 RATIO

## 2019-09-18 PROCEDURE — 90686 IIV4 VACC NO PRSV 0.5 ML IM: CPT

## 2019-09-18 PROCEDURE — 99214 OFFICE O/P EST MOD 30 MIN: CPT | Mod: 25

## 2019-09-18 PROCEDURE — 85610 PROTHROMBIN TIME: CPT | Mod: QW

## 2019-09-18 PROCEDURE — 11730 AVULSION NAIL PLATE SIMPLE 1: CPT

## 2019-09-18 PROCEDURE — G0008: CPT

## 2019-09-18 NOTE — HISTORY OF PRESENT ILLNESS
[Coronary Artery Disease] : Coronary Artery Disease [Hyperlipidemia] : Hyperlipidemia [Diabetes Mellitus] : Diabetes Mellitus [Hypertension] : Hypertension [Other: ___] : [unfilled] [Target goal met] : BP target goal met [Does not check BP] : The patient is not checking blood pressure [120/80] : Target blood pressure is 120/80 [Doing Well] : Patient is doing well [Managed with medications] : managed with  medication [High Intensity therapy] : Patient is currently on high intensity statin therapy [Well Controlled] : Overall status has been well controlled [FreeTextEntry6] : diarrhea  slowing  down still having  frequent bm  no  temp c/o  left  sided  back pain and left  5 th toe  pain 1 mo   [de-identified] : no   longer takiong  any meds  av  120  [Symptoms Limit Activities] : Symptoms do not limit ~his/her~ activities

## 2019-09-18 NOTE — DATA REVIEWED
[FreeTextEntry1] : inr  2.4 same dose do not take  any supplements unless  checked eat consistent diet\par

## 2019-09-18 NOTE — ASSESSMENT
[FreeTextEntry1] : still has  sx of  c-dif  will refil deficid  infect  toenail  removed pt  tolerated procdure  well back johann  advil

## 2019-09-18 NOTE — PHYSICAL EXAM
[PERRL] : pupils equal round and reactive to light [No Acute Distress] : no acute distress [Normal Oropharynx] : the oropharynx was normal [No Lymphadenopathy] : no lymphadenopathy [Clear to Auscultation] : lungs were clear to auscultation bilaterally [Regular Rhythm] : with a regular rhythm [No Murmur] : no murmur heard [No Edema] : there was no peripheral edema [Normal] : no CVA or spinal tenderness [No Rash] : no rash [Speech Grossly Normal] : speech grossly normal [de-identified] : infected  ingrown  toenaiol  left  5 th toe  medial  side

## 2019-09-19 ENCOUNTER — APPOINTMENT (OUTPATIENT)
Dept: CARDIOLOGY | Facility: CLINIC | Age: 84
End: 2019-09-19

## 2019-09-25 ENCOUNTER — APPOINTMENT (OUTPATIENT)
Dept: FAMILY MEDICINE | Facility: CLINIC | Age: 84
End: 2019-09-25
Payer: MEDICARE

## 2019-09-25 ENCOUNTER — RX RENEWAL (OUTPATIENT)
Age: 84
End: 2019-09-25

## 2019-09-25 VITALS
HEIGHT: 69.5 IN | HEART RATE: 54 BPM | WEIGHT: 195.5 LBS | DIASTOLIC BLOOD PRESSURE: 54 MMHG | SYSTOLIC BLOOD PRESSURE: 102 MMHG | BODY MASS INDEX: 28.31 KG/M2 | OXYGEN SATURATION: 95 %

## 2019-09-25 PROCEDURE — 85610 PROTHROMBIN TIME: CPT | Mod: QW

## 2019-09-25 PROCEDURE — 99213 OFFICE O/P EST LOW 20 MIN: CPT | Mod: 25

## 2019-09-25 NOTE — HISTORY OF PRESENT ILLNESS
[FreeTextEntry6] : fu fo  c-diff and  ingrown toe nail \par \par bm have improved   still has pain  in left  5 th digit

## 2019-09-25 NOTE — PHYSICAL EXAM
[No Acute Distress] : no acute distress [PERRL] : pupils equal round and reactive to light [Normal Oropharynx] : the oropharynx was normal [No Lymphadenopathy] : no lymphadenopathy [Clear to Auscultation] : lungs were clear to auscultation bilaterally [Regular Rhythm] : with a regular rhythm [No Murmur] : no murmur heard [No Edema] : there was no peripheral edema [Normal] : no CVA or spinal tenderness [No Rash] : no rash [Speech Grossly Normal] : speech grossly normal [de-identified] : infected  ingrown  toenaiol  left  5 th toe  medial  side  [de-identified] : 5 th  toe nail slight ingrown  with debrie  removed

## 2019-09-28 ENCOUNTER — RX RENEWAL (OUTPATIENT)
Age: 84
End: 2019-09-28

## 2019-09-30 ENCOUNTER — RX RENEWAL (OUTPATIENT)
Age: 84
End: 2019-09-30

## 2019-10-10 ENCOUNTER — RX RENEWAL (OUTPATIENT)
Age: 84
End: 2019-10-10

## 2019-10-14 ENCOUNTER — NON-APPOINTMENT (OUTPATIENT)
Age: 84
End: 2019-10-14

## 2019-10-14 ENCOUNTER — APPOINTMENT (OUTPATIENT)
Dept: CARDIOLOGY | Facility: CLINIC | Age: 84
End: 2019-10-14
Payer: MEDICARE

## 2019-10-14 VITALS
DIASTOLIC BLOOD PRESSURE: 54 MMHG | WEIGHT: 193 LBS | BODY MASS INDEX: 27.94 KG/M2 | HEART RATE: 68 BPM | HEIGHT: 69.5 IN | SYSTOLIC BLOOD PRESSURE: 84 MMHG | OXYGEN SATURATION: 94 %

## 2019-10-14 PROCEDURE — 99214 OFFICE O/P EST MOD 30 MIN: CPT | Mod: 25

## 2019-10-14 PROCEDURE — 93000 ELECTROCARDIOGRAM COMPLETE: CPT

## 2019-10-14 NOTE — REASON FOR VISIT
[Follow-Up - Clinic] : a clinic follow-up of [Cardiomyopathy] : cardiomyopathy [Coronary Artery Disease] : coronary artery disease [Dyspnea] : dyspnea [Fatigue] : feeling tired (fatigue)

## 2019-10-14 NOTE — PHYSICAL EXAM
[Normal Appearance] : normal appearance [General Appearance - Well Developed] : well developed [Well Groomed] : well groomed [General Appearance - Well Nourished] : well nourished [Normal Conjunctiva] : the conjunctiva exhibited no abnormalities [General Appearance - In No Acute Distress] : no acute distress [No Oral Cyanosis] : no oral cyanosis [No Oral Pallor] : no oral pallor [Normal Jugular Venous V Waves Present] : normal jugular venous V waves present [No Jugular Venous Cook A Waves] : no jugular venous cook A waves [Respiration, Rhythm And Depth] : normal respiratory rhythm and effort [Auscultation Breath Sounds / Voice Sounds] : lungs were clear to auscultation bilaterally [Exaggerated Use Of Accessory Muscles For Inspiration] : no accessory muscle use [Heart Rate And Rhythm] : heart rate and rhythm were normal [Heart Sounds] : normal S1 and S2 [Arterial Pulses Normal] : the arterial pulses were normal [Edema] : no peripheral edema present [Abdomen Soft] : soft [Systolic grade ___/6] : A grade [unfilled]/6 systolic murmur was heard. [Abnormal Walk] : normal gait [Abdomen Tenderness] : non-tender [Nail Clubbing] : no clubbing of the fingernails [Skin Turgor] : normal skin turgor [Cyanosis, Localized] : no localized cyanosis [] : no rash [Oriented To Time, Place, And Person] : oriented to person, place, and time [Affect] : the affect was normal [Impaired Insight] : insight and judgment were intact [Memory Recent] : recent memory was not impaired [FreeTextEntry1] : no JVD or bruits

## 2019-10-15 ENCOUNTER — RX RENEWAL (OUTPATIENT)
Age: 84
End: 2019-10-15

## 2019-10-31 ENCOUNTER — RX RENEWAL (OUTPATIENT)
Age: 84
End: 2019-10-31

## 2019-11-17 ENCOUNTER — RX RENEWAL (OUTPATIENT)
Age: 84
End: 2019-11-17

## 2019-11-20 ENCOUNTER — APPOINTMENT (OUTPATIENT)
Dept: ELECTROPHYSIOLOGY | Facility: CLINIC | Age: 84
End: 2019-11-20

## 2019-11-23 ENCOUNTER — RX RENEWAL (OUTPATIENT)
Age: 84
End: 2019-11-23

## 2019-12-04 NOTE — DISCHARGE NOTE NURSING/CASE MANAGEMENT/SOCIAL WORK - NSTOBACCOREFERRAL_GEN_A_NCS
[facility] ORTHOPEDICS POST-OP NOTE    Subjective:           Chief Complaint:   Chief Complaint   Patient presents with    Right Shoulder - Post-op Evaluation     Right shoulder scope 11.22.19. Suture removal. States that his shoulder is doing good.        Past Medical History:   Diagnosis Date    Anxiety     Depression     Diabetes mellitus, type 2     GERD (gastroesophageal reflux disease)     Hyperlipidemia     Hypertension     Sleep apnea        Past Surgical History:   Procedure Laterality Date    ARTHROSCOPIC REPAIR OF ROTATOR CUFF OF SHOULDER Right 11/22/2019    Procedure: REPAIR, ROTATOR CUFF, ARTHROSCOPIC;  Surgeon: Junior Mondragon Jr., MD;  Location: [facility] OR;  Service: Orthopedics;  Laterality: Right;  WITH BIO PATCH    FIXATION OF TENDON Right 11/22/2019    Procedure: FIXATION, TENDON;  Surgeon: Junior Mondragon Jr., MD;  Location: [facility] OR;  Service: Orthopedics;  Laterality: Right;    REMOVED BONE      SHOULDER ARTHROSCOPY W/ SUPERIOR LABRAL ANTERIOR POSTERIOR LESION REPAIR Right 11/22/2019    Procedure: ARTHROSCOPY, SHOULDER, WITH SLAP REPAIR;  Surgeon: Junior Mondragon Jr., MD;  Location: [facility] OR;  Service: Orthopedics;  Laterality: Right;    SINUS SURGERY         Current Outpatient Medications   Medication Sig    ALPRAZolam (XANAX) 0.25 MG tablet Take 1 tablet (0.25 mg total) by mouth 3 (three) times daily as needed.    ascorbic acid, vitamin C, (VITAMIN C) 500 MG tablet Take 500 mg by mouth once daily.    aspirin (ECOTRIN) 81 MG EC tablet Take 1 tablet by mouth once daily.    b complex vitamins capsule Take 1 capsule by mouth once daily.    CONTOUR NEXT STRIPS Strp     dapagliflozin (FARXIGA) 10 mg Tab Take 1 tablet by mouth once daily.    glipiZIDE (GLUCOTROL) 5 MG tablet Take 5 mg by mouth once daily.     hydroCHLOROthiazide (HYDRODIURIL) 12.5 MG Tab TAKE 1 TABLET BY MOUTH ONCE DAILY    JANUVIA 100 mg Tab 100 mg once daily.     losartan (COZAAR) 100 MG tablet TAKE 1 TABLET BY  MOUTH EVERY DAY    metformin (GLUCOPHAGE) 1000 MG tablet TAKE ONE TABLET BY MOUTH TWICE DAILY    multivitamin capsule Take 1 capsule by mouth once daily.    oxyCODONE-acetaminophen (PERCOCET)  mg per tablet Take 1 tablet by mouth every 4 (four) hours as needed for Pain.    rosuvastatin (CRESTOR) 10 MG tablet Take 1 tablet by mouth every evening.     losartan (COZAAR) 100 MG tablet TAKE 1 TABLET BY MOUTH EVERY DAY (Patient not taking: Reported on 12/4/2019)    losartan (COZAAR) 100 MG tablet TAKE 1 TABLET BY MOUTH EVERY DAY (Patient not taking: Reported on 12/4/2019)    promethazine (PHENERGAN) 25 MG tablet Take 1 tablet (25 mg total) by mouth every 8 (eight) hours as needed for Nausea. (Patient not taking: Reported on 12/4/2019)     No current facility-administered medications for this visit.        Review of patient's allergies indicates:  No Known Allergies    Family History   Problem Relation Age of Onset    Cancer Father     Diabetes Father     Hypertension Father        Social History     Socioeconomic History    Marital status:      Spouse name: Not on file    Number of children: Not on file    Years of education: Not on file    Highest education level: Not on file   Occupational History    Not on file   Social Needs    Financial resource strain: Not on file    Food insecurity:     Worry: Not on file     Inability: Not on file    Transportation needs:     Medical: Not on file     Non-medical: Not on file   Tobacco Use    Smoking status: Never Smoker    Smokeless tobacco: Never Used   Substance and Sexual Activity    Alcohol use: No    Drug use: No    Sexual activity: Yes   Lifestyle    Physical activity:     Days per week: Not on file     Minutes per session: Not on file    Stress: Only a little   Relationships    Social connections:     Talks on phone: Not on file     Gets together: Not on file     Attends Gnosticism service: Not on file     Active member of club or  organization: Not on file     Attends meetings of clubs or organizations: Not on file     Relationship status: Not on file   Other Topics Concern    Not on file   Social History Narrative    Not on file       History of present illness:  Follow-up right shoulder arthroscopy.  He had what appeared to be a longstanding slap issues and instability and subsequent rotator cuff tear.  So we did a biceps tenodesis we did anterior labral reefing and rotator cuff reinforcement with Biopatch.  He is doing okay at 2 weeks.  Not much pain    Review of Systems:    Musculoskeletal:  See HPI      Objective:        Physical Examination:    Vital Signs:    Vitals:    12/04/19 0839   BP: 132/86   Pulse: 89       Body mass index is 31.01 kg/m².    This a well-developed, well nourished patient in no acute distress.  They are alert and oriented and cooperative to examination.        So physical exam shows a scab had irritation of skin where his Band-Aids were otherwise the wounds look fine sutures are out. We did not test his range of motion lower arms doing fine.  Pertinent New Results:    XRAY Report / Interpretation:       Assessment/Plan:   So our plan simple sling circumduction exercises.  Pain medicine renewal not today. Start therapy January.  Return in January.  X-ray next trip.    This note was created using Dragon voice recognition software that occasionally misinterpreted phrases or words.       Patient declined information

## 2019-12-08 ENCOUNTER — RX RENEWAL (OUTPATIENT)
Age: 84
End: 2019-12-08

## 2019-12-12 ENCOUNTER — RX RENEWAL (OUTPATIENT)
Age: 84
End: 2019-12-12

## 2019-12-21 ENCOUNTER — RX RENEWAL (OUTPATIENT)
Age: 84
End: 2019-12-21

## 2019-12-23 ENCOUNTER — RX RENEWAL (OUTPATIENT)
Age: 84
End: 2019-12-23

## 2019-12-26 ENCOUNTER — TRANSCRIPTION ENCOUNTER (OUTPATIENT)
Age: 84
End: 2019-12-26

## 2019-12-27 ENCOUNTER — APPOINTMENT (OUTPATIENT)
Dept: PHYSICAL MEDICINE AND REHAB | Facility: CLINIC | Age: 84
End: 2019-12-27
Payer: MEDICARE

## 2019-12-27 DIAGNOSIS — M54.5 LOW BACK PAIN: ICD-10-CM

## 2019-12-27 PROCEDURE — 72100 X-RAY EXAM L-S SPINE 2/3 VWS: CPT

## 2019-12-27 PROCEDURE — 99203 OFFICE O/P NEW LOW 30 MIN: CPT

## 2019-12-27 RX ORDER — VANCOMYCIN HYDROCHLORIDE 250 MG/1
250 CAPSULE ORAL 4 TIMES DAILY
Qty: 40 | Refills: 0 | Status: DISCONTINUED | COMMUNITY
Start: 2019-07-20 | End: 2019-12-27

## 2019-12-27 RX ORDER — METRONIDAZOLE 500 MG/1
500 TABLET ORAL 3 TIMES DAILY
Qty: 30 | Refills: 0 | Status: DISCONTINUED | COMMUNITY
Start: 2019-07-07 | End: 2019-12-27

## 2019-12-27 RX ORDER — AMLODIPINE BESYLATE 5 MG/1
5 TABLET ORAL DAILY
Qty: 90 | Refills: 3 | Status: DISCONTINUED | COMMUNITY
Start: 2017-06-06 | End: 2019-12-27

## 2019-12-27 RX ORDER — PRAVASTATIN SODIUM 10 MG/1
10 TABLET ORAL
Refills: 0 | Status: DISCONTINUED | COMMUNITY
Start: 2019-03-04 | End: 2019-12-27

## 2019-12-27 RX ORDER — CARVEDILOL 6.25 MG/1
6.25 TABLET, FILM COATED ORAL TWICE DAILY
Refills: 0 | Status: DISCONTINUED | COMMUNITY
Start: 2019-02-27 | End: 2019-12-27

## 2019-12-27 NOTE — HISTORY OF PRESENT ILLNESS
[FreeTextEntry1] : 88 y.o. M presents to office w/ c/o CLBP since 1990's after several work related accidents.  Pt. was maintained on narcotic analgesics (ie, fentanyl patch) for about 10 years and stopped in 2017.  Pt. states that his LBP radiates to both hips but not down legs.  Pt. denies N/T/W/B in legs/feet.  ??? h/o DM PN.  No recent spinal imaging studies.  No recent P.T.  Pt. underwent LESI over 10 years ago which were helpful.  Takes Tramadol 50 mg po qhs prn.

## 2019-12-27 NOTE — DATA REVIEWED
[FreeTextEntry1] : X-rays L-spine (2 views) obtained at today's office visit: c/w dextroscoliosis; multilevel DDD worse L3-4.  Pigtail catheter.

## 2019-12-27 NOTE — ASSESSMENT
[FreeTextEntry1] : 88 y.o. M w/ multiple medical comorbidities w/ CLBP likely 2' lumbar spondylosis/DDD.  NSAIDs contraindicated 2' patient's extensive cardiac hx.  Narcotics not recommended.  Will start P.T. for modalities, gentle ROM, stretching and strengthening exercises.  May consider lumbar MBBs if patient is unable to advance his rehab program.  Pt. understands that he will need cardiac clearance prior to any spinal injections.   RTC 6 weeks.

## 2019-12-27 NOTE — PHYSICAL EXAM
[FreeTextEntry1] : NAD\par A&Ox3\par Non-obese\par ROM L-spine: 1/2 full forward flexion; 5' ext w/ pain\par ROM Hips: smooth IR/ER w/o pain\par Pelvic tilt: none\par Seated slump test: neg\par SLR: neg\par DTR's: symmetric knees; absent ankles\par MMT: 5/5 b/l LE\par Sensation: SILT\par Toe & Heel Walk: Yes\par

## 2020-01-13 ENCOUNTER — APPOINTMENT (OUTPATIENT)
Dept: CARDIOLOGY | Facility: CLINIC | Age: 85
End: 2020-01-13

## 2020-01-29 ENCOUNTER — RX RENEWAL (OUTPATIENT)
Age: 85
End: 2020-01-29

## 2020-02-02 ENCOUNTER — RX RENEWAL (OUTPATIENT)
Age: 85
End: 2020-02-02

## 2020-02-07 ENCOUNTER — TRANSCRIPTION ENCOUNTER (OUTPATIENT)
Age: 85
End: 2020-02-07

## 2020-02-20 ENCOUNTER — NON-APPOINTMENT (OUTPATIENT)
Age: 85
End: 2020-02-20

## 2020-02-20 ENCOUNTER — APPOINTMENT (OUTPATIENT)
Dept: FAMILY MEDICINE | Facility: CLINIC | Age: 85
End: 2020-02-20
Payer: MEDICARE

## 2020-02-20 VITALS
HEART RATE: 88 BPM | WEIGHT: 200 LBS | BODY MASS INDEX: 33.32 KG/M2 | SYSTOLIC BLOOD PRESSURE: 122 MMHG | DIASTOLIC BLOOD PRESSURE: 62 MMHG | OXYGEN SATURATION: 96 % | HEIGHT: 64.9 IN

## 2020-02-20 VITALS — DIASTOLIC BLOOD PRESSURE: 80 MMHG | RESPIRATION RATE: 16 BRPM | SYSTOLIC BLOOD PRESSURE: 122 MMHG | HEART RATE: 72 BPM

## 2020-02-20 DIAGNOSIS — R06.02 SHORTNESS OF BREATH: ICD-10-CM

## 2020-02-20 LAB
INR PPP: 2.3 RATIO
INR PPP: 2.5 RATIO
INR PPP: 3.6 RATIO
POCT-PROTHROMBIN TIME: 28 SECS

## 2020-02-20 PROCEDURE — 93000 ELECTROCARDIOGRAM COMPLETE: CPT

## 2020-02-20 PROCEDURE — 99214 OFFICE O/P EST MOD 30 MIN: CPT | Mod: 25

## 2020-02-20 NOTE — PHYSICAL EXAM
[No Acute Distress] : no acute distress [PERRL] : pupils equal round and reactive to light [Clear to Auscultation] : lungs were clear to auscultation bilaterally [No Lymphadenopathy] : no lymphadenopathy [Regular Rhythm] : with a regular rhythm [No Edema] : there was no peripheral edema [Normal Insight/Judgement] : insight and judgment were intact [Normal] : no rash [No Focal Deficits] : no focal deficits

## 2020-02-20 NOTE — ASSESSMENT
[FreeTextEntry1] : GARDNER  WITH CHEST  BURNING  IN COLD  WEATHER CARDIO EVAL PERSISTANT  C-DIFF  REPEAT  MIRIAM FOR C-DIFF AND  GI  EVAL HOLD  COUMADIN FOR  1  DAY

## 2020-02-21 ENCOUNTER — APPOINTMENT (OUTPATIENT)
Dept: CARDIOLOGY | Facility: CLINIC | Age: 85
End: 2020-02-21
Payer: MEDICARE

## 2020-02-21 VITALS
DIASTOLIC BLOOD PRESSURE: 65 MMHG | HEIGHT: 64.9 IN | WEIGHT: 200 LBS | OXYGEN SATURATION: 96 % | SYSTOLIC BLOOD PRESSURE: 117 MMHG | HEART RATE: 72 BPM | BODY MASS INDEX: 33.32 KG/M2

## 2020-02-21 PROCEDURE — 99214 OFFICE O/P EST MOD 30 MIN: CPT

## 2020-02-21 NOTE — DISCUSSION/SUMMARY
[FreeTextEntry1] : \par Coronary artery disease: Stable and without angina; patient's symptoms from earlier this week having non-anginal characteristics and 12-lead ECG is without significant change; I recommend continuing medical management with clopidogrel, carvedilol, and isosorbide.  Patient will contact Dr. Sinclair if symptoms recur.\par \par Atrial fibrillation: Paroxysmal; tolerating anticoagulation.\par \par Chronic systolic heart failure: Moderate systolic dysfunction; appears compensated euvolemic; continue present management.\par \par ** Patient says that he is overdue for an ICD interrogation and will schedule an appointment with Dr. Oh for routine device management.\par \par *** I asked him to followup with Dr. Sinclair within the next one month.

## 2020-02-21 NOTE — HISTORY OF PRESENT ILLNESS
[FreeTextEntry1] : Markie Allen is an 88-year-old man with a history of coronary artery disease, remote myocardial infarction, coronary artery bypass graft surgery, coronary stents, cardiomyopathy, moderate aortic valve insufficiency, ICD, atrial fibrillation, CVA, who presents for cardiac examination - he normally sees my colleague, Dr. Yenifer Sinclair (last visit was in October 2019).  Yesterday he was seen by his primary care physician, Dr. Hart, and complained of shortness of breath and burning sensation in his chest when exposed to cold air; he was subsequently given an appointment to see me.  He says that he generally has been feeling well but describes intermittent dyspnea, often when walking up inclines surfaces.  Earlier this week he felt a burning discomfort in his chest when he inhaled cold ambient air.  He denies typical angina.  He feels well today and denies chest discomfort.  He has not been experiencing orthopnea.

## 2020-02-21 NOTE — REASON FOR VISIT
[Follow-Up - Clinic] : a clinic follow-up of [Chest Pain] : chest pain [Medication Management] : Medication management [Coronary Artery Disease] : coronary artery disease

## 2020-02-21 NOTE — PHYSICAL EXAM
[Normal Appearance] : normal appearance [General Appearance - In No Acute Distress] : no acute distress [Eyelids - No Xanthelasma] : the eyelids demonstrated no xanthelasmas [Well Groomed] : well groomed [No Oral Cyanosis] : no oral cyanosis [FreeTextEntry1] : No JVD [Respiration, Rhythm And Depth] : normal respiratory rhythm and effort [Auscultation Breath Sounds / Voice Sounds] : lungs were clear to auscultation bilaterally [Heart Rate And Rhythm] : heart rate and rhythm were normal [Bowel Sounds] : normal bowel sounds [Abdomen Soft] : soft [Heart Sounds] : normal S1 and S2 [Abnormal Walk] : normal gait [Nail Clubbing] : no clubbing of the fingernails [Cyanosis, Localized] : no localized cyanosis [Impaired Insight] : insight and judgment were intact [Oriented To Time, Place, And Person] : oriented to person, place, and time [] : no rash [Mood] : the mood was normal [Affect] : the affect was normal

## 2020-02-21 NOTE — REVIEW OF SYSTEMS
[see HPI] : see HPI [Feeling Fatigued] : not feeling fatigued [Chest Pain] : chest pain [Shortness Of Breath] : shortness of breath [Dizziness] : no dizziness [Cough] : cough [Negative] : Endocrine

## 2020-02-24 ENCOUNTER — APPOINTMENT (OUTPATIENT)
Dept: FAMILY MEDICINE | Facility: CLINIC | Age: 85
End: 2020-02-24
Payer: MEDICARE

## 2020-02-24 VITALS
TEMPERATURE: 98.2 F | HEART RATE: 74 BPM | OXYGEN SATURATION: 95 % | WEIGHT: 200 LBS | HEIGHT: 64.9 IN | DIASTOLIC BLOOD PRESSURE: 74 MMHG | SYSTOLIC BLOOD PRESSURE: 120 MMHG | BODY MASS INDEX: 33.32 KG/M2

## 2020-02-24 VITALS — DIASTOLIC BLOOD PRESSURE: 80 MMHG | SYSTOLIC BLOOD PRESSURE: 120 MMHG | HEART RATE: 72 BPM | RESPIRATION RATE: 16 BRPM

## 2020-02-24 DIAGNOSIS — Z87.09 PERSONAL HISTORY OF OTHER DISEASES OF THE RESPIRATORY SYSTEM: ICD-10-CM

## 2020-02-24 DIAGNOSIS — F11.20 OPIOID DEPENDENCE, UNCOMPLICATED: ICD-10-CM

## 2020-02-24 PROCEDURE — 99214 OFFICE O/P EST MOD 30 MIN: CPT

## 2020-02-24 NOTE — PHYSICAL EXAM
[No Acute Distress] : no acute distress [PERRL] : pupils equal round and reactive to light [Normal Oropharynx] : the oropharynx was normal [Regular Rhythm] : with a regular rhythm [No Lymphadenopathy] : no lymphadenopathy [Normal S1, S2] : normal S1 and S2 [No Spinal Tenderness] : no spinal tenderness [Normal] : no joint swelling and grossly normal strength and tone [Normal Insight/Judgement] : insight and judgment were intact [de-identified] : mild  wheezing

## 2020-02-24 NOTE — HISTORY OF PRESENT ILLNESS
[FreeTextEntry8] : cough congestion for 6 wks  getting worse mild   chest  tightness no  temp had  gotten  better now  worse  taking dificid  daily

## 2020-02-28 LAB
C DIFF TOX GENS STL QL NAA+PROBE: NORMAL
CDIFF BY PCR: NOT DETECTED

## 2020-03-10 ENCOUNTER — FORM ENCOUNTER (OUTPATIENT)
Age: 85
End: 2020-03-10

## 2020-03-11 ENCOUNTER — OUTPATIENT (OUTPATIENT)
Dept: OUTPATIENT SERVICES | Facility: HOSPITAL | Age: 85
LOS: 1 days | End: 2020-03-11
Payer: MEDICARE

## 2020-03-11 ENCOUNTER — RX RENEWAL (OUTPATIENT)
Age: 85
End: 2020-03-11

## 2020-03-11 ENCOUNTER — APPOINTMENT (OUTPATIENT)
Dept: FAMILY MEDICINE | Facility: CLINIC | Age: 85
End: 2020-03-11
Payer: MEDICARE

## 2020-03-11 ENCOUNTER — APPOINTMENT (OUTPATIENT)
Dept: RADIOLOGY | Facility: CLINIC | Age: 85
End: 2020-03-11
Payer: MEDICARE

## 2020-03-11 VITALS
WEIGHT: 200 LBS | TEMPERATURE: 98.3 F | OXYGEN SATURATION: 96 % | HEIGHT: 69 IN | BODY MASS INDEX: 29.62 KG/M2 | DIASTOLIC BLOOD PRESSURE: 68 MMHG | HEART RATE: 82 BPM | SYSTOLIC BLOOD PRESSURE: 122 MMHG

## 2020-03-11 DIAGNOSIS — Z98.89 OTHER SPECIFIED POSTPROCEDURAL STATES: Chronic | ICD-10-CM

## 2020-03-11 DIAGNOSIS — R05 COUGH: ICD-10-CM

## 2020-03-11 DIAGNOSIS — Z00.8 ENCOUNTER FOR OTHER GENERAL EXAMINATION: ICD-10-CM

## 2020-03-11 DIAGNOSIS — S22.39XA FRACTURE OF ONE RIB, UNSPECIFIED SIDE, INITIAL ENCOUNTER FOR CLOSED FRACTURE: Chronic | ICD-10-CM

## 2020-03-11 DIAGNOSIS — Z95.1 PRESENCE OF AORTOCORONARY BYPASS GRAFT: Chronic | ICD-10-CM

## 2020-03-11 DIAGNOSIS — Z98.49 CATARACT EXTRACTION STATUS, UNSPECIFIED EYE: Chronic | ICD-10-CM

## 2020-03-11 PROCEDURE — 71046 X-RAY EXAM CHEST 2 VIEWS: CPT | Mod: 26

## 2020-03-11 PROCEDURE — 85610 PROTHROMBIN TIME: CPT | Mod: QW

## 2020-03-11 PROCEDURE — 71046 X-RAY EXAM CHEST 2 VIEWS: CPT

## 2020-03-11 PROCEDURE — 99213 OFFICE O/P EST LOW 20 MIN: CPT | Mod: 25

## 2020-03-20 ENCOUNTER — RX RENEWAL (OUTPATIENT)
Age: 85
End: 2020-03-20

## 2020-03-24 LAB
INR PPP: 1.4 RATIO
INR PPP: 1.7 RATIO
INR PPP: 2.8 RATIO

## 2020-03-27 ENCOUNTER — RX RENEWAL (OUTPATIENT)
Age: 85
End: 2020-03-27

## 2020-04-02 ENCOUNTER — APPOINTMENT (OUTPATIENT)
Dept: GASTROENTEROLOGY | Facility: CLINIC | Age: 85
End: 2020-04-02

## 2020-04-07 ENCOUNTER — APPOINTMENT (OUTPATIENT)
Dept: ELECTROPHYSIOLOGY | Facility: CLINIC | Age: 85
End: 2020-04-07

## 2020-04-15 ENCOUNTER — RX RENEWAL (OUTPATIENT)
Age: 85
End: 2020-04-15

## 2020-04-17 ENCOUNTER — APPOINTMENT (OUTPATIENT)
Dept: FAMILY MEDICINE | Facility: CLINIC | Age: 85
End: 2020-04-17
Payer: MEDICARE

## 2020-04-17 PROCEDURE — 99213 OFFICE O/P EST LOW 20 MIN: CPT | Mod: 95

## 2020-04-17 NOTE — PLAN
[FreeTextEntry1] : parker  will  have  to be  seen in office  or f/u with cardio when covid situation better take probiotics  with amoxil  for the shortest period of time

## 2020-04-17 NOTE — HISTORY OF PRESENT ILLNESS
[Home] : at home, [unfilled] , at the time of the visit. [Other Location: e.g. Home (Enter Location, City,State)___] : at [unfilled] [Patient] : the patient [FreeTextEntry8] : dental  pain no temp no temp  no facial pAIN also parker 1 block no  cough no leg swelling no  pnd quit smoking 2 ppd 1971 has not had any bleeding  with coumadin

## 2020-05-04 ENCOUNTER — RX RENEWAL (OUTPATIENT)
Age: 85
End: 2020-05-04

## 2020-05-05 ENCOUNTER — APPOINTMENT (OUTPATIENT)
Dept: ELECTROPHYSIOLOGY | Facility: CLINIC | Age: 85
End: 2020-05-05
Payer: MEDICARE

## 2020-05-05 VITALS
SYSTOLIC BLOOD PRESSURE: 117 MMHG | HEIGHT: 69 IN | DIASTOLIC BLOOD PRESSURE: 68 MMHG | BODY MASS INDEX: 29.62 KG/M2 | HEART RATE: 65 BPM | OXYGEN SATURATION: 96 % | WEIGHT: 200 LBS

## 2020-05-05 PROCEDURE — 93283 PRGRMG EVAL IMPLANTABLE DFB: CPT

## 2020-05-05 RX ORDER — HYDROCORTISONE ACETATE AND PRAMOXINE HYDROCHLORIDE 25; 10 MG/G; MG/G
2.5-1 CREAM TOPICAL TWICE DAILY
Qty: 1 | Refills: 3 | Status: DISCONTINUED | COMMUNITY
Start: 2017-06-15 | End: 2020-05-05

## 2020-05-05 RX ORDER — AMOXICILLIN 875 MG/1
875 TABLET, FILM COATED ORAL
Qty: 1 | Refills: 0 | Status: DISCONTINUED | COMMUNITY
Start: 2020-04-17 | End: 2020-05-05

## 2020-05-05 RX ORDER — FIDAXOMICIN 200 MG/1
200 TABLET, FILM COATED ORAL TWICE DAILY
Qty: 20 | Refills: 1 | Status: DISCONTINUED | COMMUNITY
Start: 2019-07-31 | End: 2020-05-05

## 2020-05-05 RX ORDER — PREDNISONE 10 MG/1
10 TABLET ORAL DAILY
Qty: 20 | Refills: 0 | Status: DISCONTINUED | COMMUNITY
Start: 2020-02-24 | End: 2020-05-05

## 2020-05-05 RX ORDER — DOXYCYCLINE 100 MG/1
100 CAPSULE ORAL
Qty: 20 | Refills: 0 | Status: DISCONTINUED | COMMUNITY
Start: 2020-02-24 | End: 2020-05-05

## 2020-05-05 NOTE — HISTORY OF PRESENT ILLNESS
[Palpitations] : no palpitations [SOB] : dyspnea [Syncope] : no syncope [ICD Shocks] : no recent ICD shocks [Dizziness] : no dizziness [Chest Pain] : no chest pain or discomfort [de-identified] : 88 year old male with history of CM, AICD in place.  Patient has not had device interrogation in approximately two years.  Reports GARDNER for past year.

## 2020-05-05 NOTE — PROCEDURE
[No] : not [ICD] : Implantable cardioverter-defibrillator [Normal] : The battery status is normal. [NSR] : normal sinus rhythm [Threshold Testing Performed] : Threshold testing was performed [Lead Imp:  ___ohms] : lead impedance was [unfilled] ohms [Sensing Amplitude ___mv] : sensing amplitude was [unfilled] mv [___ ms] : [unfilled] ms [___V @] : [unfilled] V [None] : none [Asense-Vsense ___ %] : Asense-Vsense [unfilled]% [Asense-Vpace ___ %] : Asense-Vpace [unfilled]% [de-identified] : [employer]onic [de-identified] : Kanu [de-identified] :  [de-identified] : [id number] [de-identified] : AAI-DDD [de-identified] : 4.8years

## 2020-05-05 NOTE — DISCUSSION/SUMMARY
[Routine Follow-up in 3-4 months] : routine follow-up in 3-4 months [FreeTextEntry1] : Patient presents for routine AICD follow up.  Interrogation reveals adequate sensing and pacing thresholds, no tachy therapies delivered, three brief episodes of NSVT since last interrogation in 2018. Contacted central monitoring team to attempt to establish remote monitoring for patient. In the interim patient will follow up in office in three months. Advised patient to follow up with Dr. Sinclair for GARDNER.

## 2020-05-14 ENCOUNTER — RX RENEWAL (OUTPATIENT)
Age: 85
End: 2020-05-14

## 2020-05-17 ENCOUNTER — RX RENEWAL (OUTPATIENT)
Age: 85
End: 2020-05-17

## 2020-05-19 ENCOUNTER — APPOINTMENT (OUTPATIENT)
Dept: CARDIOLOGY | Facility: CLINIC | Age: 85
End: 2020-05-19
Payer: MEDICARE

## 2020-05-19 DIAGNOSIS — R07.9 CHEST PAIN, UNSPECIFIED: ICD-10-CM

## 2020-05-19 PROCEDURE — 99213 OFFICE O/P EST LOW 20 MIN: CPT | Mod: 95

## 2020-05-19 NOTE — REASON FOR VISIT
[Follow-Up - Clinic] : a clinic follow-up of [Cardiomyopathy] : cardiomyopathy [Chest Pain] : chest pain [Coronary Artery Disease] : coronary artery disease [Dyspnea] : dyspnea

## 2020-05-20 ENCOUNTER — APPOINTMENT (OUTPATIENT)
Dept: FAMILY MEDICINE | Facility: CLINIC | Age: 85
End: 2020-05-20
Payer: MEDICARE

## 2020-05-20 VITALS
OXYGEN SATURATION: 96 % | SYSTOLIC BLOOD PRESSURE: 100 MMHG | HEIGHT: 69 IN | WEIGHT: 200 LBS | DIASTOLIC BLOOD PRESSURE: 46 MMHG | HEART RATE: 72 BPM | BODY MASS INDEX: 29.62 KG/M2 | RESPIRATION RATE: 16 BRPM

## 2020-05-20 DIAGNOSIS — R04.0 EPISTAXIS: ICD-10-CM

## 2020-05-20 PROCEDURE — 99213 OFFICE O/P EST LOW 20 MIN: CPT

## 2020-05-22 PROBLEM — R07.9 CHEST PAIN: Status: ACTIVE | Noted: 2020-05-22

## 2020-05-22 NOTE — REVIEW OF SYSTEMS
[Eyeglasses] : currently wearing eyeglasses [see HPI] : see HPI [Joint Pain] : joint pain [Negative] : Endocrine [Recent Weight Loss (___ Lbs)] : no recent weight loss [Dyspnea on exertion] : dyspnea during exertion [Shortness Of Breath] : no shortness of breath [Blurry Vision] : no blurred vision [Chest  Pressure] : no chest pressure [Chest Pain] : chest pain [Lower Ext Edema] : no extremity edema [Leg Claudication] : no intermittent leg claudication [Palpitations] : no palpitations [Muscle Cramps] : no muscle cramps

## 2020-05-22 NOTE — HISTORY OF PRESENT ILLNESS
[FreeTextEntry1] : This visit was provided via telehealth using real-time 2-way audiovisual technology.  The patient, Markie Allen, was located at home at the time of visit.\par The provider, Yenifer Sinclair DO, was located at the [facility] in [place], [place] at the time of visit.  The patient, Markie Allen, and Physician, Yenifer Sinclair participated in the telehealth encounter.  \par Verbal consent was given on 05/22/2020 by geronimo Wilson.

## 2020-05-26 ENCOUNTER — RX RENEWAL (OUTPATIENT)
Age: 85
End: 2020-05-26

## 2020-05-27 ENCOUNTER — APPOINTMENT (OUTPATIENT)
Dept: CARDIOLOGY | Facility: CLINIC | Age: 85
End: 2020-05-27
Payer: MEDICARE

## 2020-05-27 PROCEDURE — 85610 PROTHROMBIN TIME: CPT | Mod: QW

## 2020-05-27 PROCEDURE — 93306 TTE W/DOPPLER COMPLETE: CPT

## 2020-05-27 RX ADMIN — PERFLUTREN MG/ML: 6.52 INJECTION, SUSPENSION INTRAVENOUS at 00:00

## 2020-05-28 RX ORDER — PERFLUTREN 6.52 MG/ML
6.52 INJECTION, SUSPENSION INTRAVENOUS
Qty: 1 | Refills: 0 | Status: COMPLETED | OUTPATIENT
Start: 2020-05-27

## 2020-05-29 ENCOUNTER — INPATIENT (INPATIENT)
Facility: HOSPITAL | Age: 85
LOS: 3 days | Discharge: HOME | DRG: 247 | End: 2020-06-02
Attending: INTERNAL MEDICINE | Admitting: HOSPITALIST
Payer: MEDICARE

## 2020-05-29 ENCOUNTER — APPOINTMENT (OUTPATIENT)
Dept: CARDIOLOGY | Facility: CLINIC | Age: 85
End: 2020-05-29

## 2020-05-29 VITALS
HEART RATE: 65 BPM | OXYGEN SATURATION: 95 % | SYSTOLIC BLOOD PRESSURE: 119 MMHG | DIASTOLIC BLOOD PRESSURE: 65 MMHG | TEMPERATURE: 98 F | RESPIRATION RATE: 16 BRPM | HEIGHT: 69 IN | WEIGHT: 199.96 LBS

## 2020-05-29 DIAGNOSIS — Z98.89 OTHER SPECIFIED POSTPROCEDURAL STATES: Chronic | ICD-10-CM

## 2020-05-29 DIAGNOSIS — S22.39XA FRACTURE OF ONE RIB, UNSPECIFIED SIDE, INITIAL ENCOUNTER FOR CLOSED FRACTURE: Chronic | ICD-10-CM

## 2020-05-29 DIAGNOSIS — Z98.49 CATARACT EXTRACTION STATUS, UNSPECIFIED EYE: Chronic | ICD-10-CM

## 2020-05-29 DIAGNOSIS — Z95.1 PRESENCE OF AORTOCORONARY BYPASS GRAFT: Chronic | ICD-10-CM

## 2020-05-29 DIAGNOSIS — R07.9 CHEST PAIN, UNSPECIFIED: ICD-10-CM

## 2020-05-29 LAB
ALBUMIN SERPL ELPH-MCNC: 4.5 G/DL — SIGNIFICANT CHANGE UP (ref 3.3–5.2)
ALP SERPL-CCNC: 73 U/L — SIGNIFICANT CHANGE UP (ref 40–120)
ALT FLD-CCNC: 21 U/L — SIGNIFICANT CHANGE UP
ANION GAP SERPL CALC-SCNC: 12 MMOL/L — SIGNIFICANT CHANGE UP (ref 5–17)
ANION GAP SERPL CALC-SCNC: 13 MMOL/L — SIGNIFICANT CHANGE UP (ref 5–17)
APTT BLD: 40.7 SEC — HIGH (ref 27.5–36.3)
AST SERPL-CCNC: 26 U/L — SIGNIFICANT CHANGE UP
BILIRUB SERPL-MCNC: 0.5 MG/DL — SIGNIFICANT CHANGE UP (ref 0.4–2)
BUN SERPL-MCNC: 29 MG/DL — HIGH (ref 8–20)
BUN SERPL-MCNC: 30 MG/DL — HIGH (ref 8–20)
CALCIUM SERPL-MCNC: 9.4 MG/DL — SIGNIFICANT CHANGE UP (ref 8.6–10.2)
CALCIUM SERPL-MCNC: 9.5 MG/DL — SIGNIFICANT CHANGE UP (ref 8.6–10.2)
CHLORIDE SERPL-SCNC: 103 MMOL/L — SIGNIFICANT CHANGE UP (ref 98–107)
CHLORIDE SERPL-SCNC: 105 MMOL/L — SIGNIFICANT CHANGE UP (ref 98–107)
CO2 SERPL-SCNC: 20 MMOL/L — LOW (ref 22–29)
CO2 SERPL-SCNC: 21 MMOL/L — LOW (ref 22–29)
CREAT SERPL-MCNC: 1.49 MG/DL — HIGH (ref 0.5–1.3)
CREAT SERPL-MCNC: 1.69 MG/DL — HIGH (ref 0.5–1.3)
GLUCOSE BLDC GLUCOMTR-MCNC: 103 MG/DL — HIGH (ref 70–99)
GLUCOSE BLDC GLUCOMTR-MCNC: 113 MG/DL — HIGH (ref 70–99)
GLUCOSE SERPL-MCNC: 111 MG/DL — HIGH (ref 70–99)
GLUCOSE SERPL-MCNC: 115 MG/DL — HIGH (ref 70–99)
HCT VFR BLD CALC: 41.4 % — SIGNIFICANT CHANGE UP (ref 39–50)
HGB BLD-MCNC: 13.2 G/DL — SIGNIFICANT CHANGE UP (ref 13–17)
INR BLD: 2.34 RATIO — HIGH (ref 0.88–1.16)
INR PPP: 1.6 RATIO
LIDOCAIN IGE QN: 141 U/L — HIGH (ref 22–51)
MAGNESIUM SERPL-MCNC: 1.6 MG/DL — SIGNIFICANT CHANGE UP (ref 1.6–2.6)
MCHC RBC-ENTMCNC: 29.5 PG — SIGNIFICANT CHANGE UP (ref 27–34)
MCHC RBC-ENTMCNC: 31.9 GM/DL — LOW (ref 32–36)
MCV RBC AUTO: 92.6 FL — SIGNIFICANT CHANGE UP (ref 80–100)
NT-PROBNP SERPL-SCNC: 799 PG/ML — HIGH (ref 0–300)
PLATELET # BLD AUTO: 145 K/UL — LOW (ref 150–400)
POTASSIUM SERPL-MCNC: 4.6 MMOL/L — SIGNIFICANT CHANGE UP (ref 3.5–5.3)
POTASSIUM SERPL-MCNC: 5.3 MMOL/L — SIGNIFICANT CHANGE UP (ref 3.5–5.3)
POTASSIUM SERPL-SCNC: 4.6 MMOL/L — SIGNIFICANT CHANGE UP (ref 3.5–5.3)
POTASSIUM SERPL-SCNC: 5.3 MMOL/L — SIGNIFICANT CHANGE UP (ref 3.5–5.3)
PROT SERPL-MCNC: 6.8 G/DL — SIGNIFICANT CHANGE UP (ref 6.6–8.7)
PROTHROM AB SERPL-ACNC: 27.1 SEC — HIGH (ref 10–12.9)
RBC # BLD: 4.47 M/UL — SIGNIFICANT CHANGE UP (ref 4.2–5.8)
RBC # FLD: 13.6 % — SIGNIFICANT CHANGE UP (ref 10.3–14.5)
SODIUM SERPL-SCNC: 137 MMOL/L — SIGNIFICANT CHANGE UP (ref 135–145)
SODIUM SERPL-SCNC: 137 MMOL/L — SIGNIFICANT CHANGE UP (ref 135–145)
TROPONIN T SERPL-MCNC: <0.01 NG/ML — SIGNIFICANT CHANGE UP (ref 0–0.06)
WBC # BLD: 4.19 K/UL — SIGNIFICANT CHANGE UP (ref 3.8–10.5)
WBC # FLD AUTO: 4.19 K/UL — SIGNIFICANT CHANGE UP (ref 3.8–10.5)

## 2020-05-29 PROCEDURE — 99285 EMERGENCY DEPT VISIT HI MDM: CPT

## 2020-05-29 PROCEDURE — 99223 1ST HOSP IP/OBS HIGH 75: CPT

## 2020-05-29 PROCEDURE — 71045 X-RAY EXAM CHEST 1 VIEW: CPT | Mod: 26

## 2020-05-29 PROCEDURE — 93010 ELECTROCARDIOGRAM REPORT: CPT

## 2020-05-29 RX ORDER — MULTIVIT-MIN/FERROUS GLUCONATE 9 MG/15 ML
1 LIQUID (ML) ORAL
Qty: 0 | Refills: 0 | DISCHARGE

## 2020-05-29 RX ORDER — ENOXAPARIN SODIUM 100 MG/ML
40 INJECTION SUBCUTANEOUS DAILY
Refills: 0 | Status: DISCONTINUED | OUTPATIENT
Start: 2020-05-29 | End: 2020-06-02

## 2020-05-29 RX ORDER — PREGABALIN 225 MG/1
1 CAPSULE ORAL
Qty: 0 | Refills: 0 | DISCHARGE

## 2020-05-29 RX ORDER — GLIMEPIRIDE 1 MG
1 TABLET ORAL
Qty: 0 | Refills: 0 | DISCHARGE

## 2020-05-29 RX ORDER — GLUCAGON INJECTION, SOLUTION 0.5 MG/.1ML
1 INJECTION, SOLUTION SUBCUTANEOUS ONCE
Refills: 0 | Status: DISCONTINUED | OUTPATIENT
Start: 2020-05-29 | End: 2020-06-01

## 2020-05-29 RX ORDER — DEXTROSE 50 % IN WATER 50 %
25 SYRINGE (ML) INTRAVENOUS ONCE
Refills: 0 | Status: DISCONTINUED | OUTPATIENT
Start: 2020-05-29 | End: 2020-05-30

## 2020-05-29 RX ORDER — AMLODIPINE BESYLATE 2.5 MG/1
1 TABLET ORAL
Qty: 0 | Refills: 0 | DISCHARGE

## 2020-05-29 RX ORDER — HYDROCORTISONE/PRAMOXINE 2.5 %-1 %
1 CREAM WITH APPLICATOR RECTAL
Qty: 0 | Refills: 0 | DISCHARGE

## 2020-05-29 RX ORDER — CARVEDILOL PHOSPHATE 80 MG/1
1 CAPSULE, EXTENDED RELEASE ORAL
Qty: 0 | Refills: 0 | DISCHARGE

## 2020-05-29 RX ORDER — SACUBITRIL AND VALSARTAN 24; 26 MG/1; MG/1
1 TABLET, FILM COATED ORAL
Refills: 0 | Status: DISCONTINUED | OUTPATIENT
Start: 2020-05-29 | End: 2020-05-31

## 2020-05-29 RX ORDER — THIAMINE MONONITRATE (VIT B1) 100 MG
1 TABLET ORAL
Qty: 0 | Refills: 0 | DISCHARGE

## 2020-05-29 RX ORDER — SODIUM CHLORIDE 9 MG/ML
1000 INJECTION, SOLUTION INTRAVENOUS
Refills: 0 | Status: DISCONTINUED | OUTPATIENT
Start: 2020-05-29 | End: 2020-05-30

## 2020-05-29 RX ORDER — INSULIN LISPRO 100/ML
VIAL (ML) SUBCUTANEOUS AT BEDTIME
Refills: 0 | Status: DISCONTINUED | OUTPATIENT
Start: 2020-05-29 | End: 2020-05-30

## 2020-05-29 RX ORDER — QUETIAPINE FUMARATE 200 MG/1
12.5 TABLET, FILM COATED ORAL AT BEDTIME
Refills: 0 | Status: DISCONTINUED | OUTPATIENT
Start: 2020-05-29 | End: 2020-06-02

## 2020-05-29 RX ORDER — ALLOPURINOL 300 MG
300 TABLET ORAL DAILY
Refills: 0 | Status: DISCONTINUED | OUTPATIENT
Start: 2020-05-29 | End: 2020-06-02

## 2020-05-29 RX ORDER — FENOFIBRATE,MICRONIZED 130 MG
145 CAPSULE ORAL DAILY
Refills: 0 | Status: DISCONTINUED | OUTPATIENT
Start: 2020-05-29 | End: 2020-06-02

## 2020-05-29 RX ORDER — CLOPIDOGREL BISULFATE 75 MG/1
75 TABLET, FILM COATED ORAL DAILY
Refills: 0 | Status: DISCONTINUED | OUTPATIENT
Start: 2020-05-29 | End: 2020-06-02

## 2020-05-29 RX ORDER — ATORVASTATIN CALCIUM 80 MG/1
40 TABLET, FILM COATED ORAL AT BEDTIME
Refills: 0 | Status: DISCONTINUED | OUTPATIENT
Start: 2020-05-29 | End: 2020-06-02

## 2020-05-29 RX ORDER — DEXTROSE 50 % IN WATER 50 %
12.5 SYRINGE (ML) INTRAVENOUS ONCE
Refills: 0 | Status: DISCONTINUED | OUTPATIENT
Start: 2020-05-29 | End: 2020-05-30

## 2020-05-29 RX ORDER — CARVEDILOL PHOSPHATE 80 MG/1
12.5 CAPSULE, EXTENDED RELEASE ORAL EVERY 12 HOURS
Refills: 0 | Status: DISCONTINUED | OUTPATIENT
Start: 2020-05-29 | End: 2020-06-02

## 2020-05-29 RX ORDER — FOLIC ACID 0.8 MG
1 TABLET ORAL DAILY
Refills: 0 | Status: DISCONTINUED | OUTPATIENT
Start: 2020-05-29 | End: 2020-06-02

## 2020-05-29 RX ORDER — ASPIRIN/CALCIUM CARB/MAGNESIUM 324 MG
325 TABLET ORAL ONCE
Refills: 0 | Status: COMPLETED | OUTPATIENT
Start: 2020-05-29 | End: 2020-05-29

## 2020-05-29 RX ORDER — INSULIN LISPRO 100/ML
VIAL (ML) SUBCUTANEOUS
Refills: 0 | Status: DISCONTINUED | OUTPATIENT
Start: 2020-05-29 | End: 2020-05-30

## 2020-05-29 RX ORDER — NITROGLYCERIN 6.5 MG
1 CAPSULE, EXTENDED RELEASE ORAL ONCE
Refills: 0 | Status: COMPLETED | OUTPATIENT
Start: 2020-05-29 | End: 2020-05-29

## 2020-05-29 RX ORDER — DEXTROSE 50 % IN WATER 50 %
15 SYRINGE (ML) INTRAVENOUS ONCE
Refills: 0 | Status: DISCONTINUED | OUTPATIENT
Start: 2020-05-29 | End: 2020-05-30

## 2020-05-29 RX ORDER — CHOLECALCIFEROL (VITAMIN D3) 125 MCG
1 CAPSULE ORAL
Qty: 0 | Refills: 0 | DISCHARGE

## 2020-05-29 RX ORDER — WARFARIN SODIUM 2.5 MG/1
1 TABLET ORAL
Qty: 0 | Refills: 0 | DISCHARGE

## 2020-05-29 RX ADMIN — QUETIAPINE FUMARATE 12.5 MILLIGRAM(S): 200 TABLET, FILM COATED ORAL at 21:16

## 2020-05-29 RX ADMIN — SACUBITRIL AND VALSARTAN 1 TABLET(S): 24; 26 TABLET, FILM COATED ORAL at 18:50

## 2020-05-29 RX ADMIN — CARVEDILOL PHOSPHATE 12.5 MILLIGRAM(S): 80 CAPSULE, EXTENDED RELEASE ORAL at 18:50

## 2020-05-29 RX ADMIN — Medication 1 INCH(S): at 11:14

## 2020-05-29 RX ADMIN — ATORVASTATIN CALCIUM 40 MILLIGRAM(S): 80 TABLET, FILM COATED ORAL at 21:16

## 2020-05-29 RX ADMIN — CLOPIDOGREL BISULFATE 75 MILLIGRAM(S): 75 TABLET, FILM COATED ORAL at 18:51

## 2020-05-29 RX ADMIN — Medication 325 MILLIGRAM(S): at 11:14

## 2020-05-29 NOTE — H&P ADULT - HISTORY OF PRESENT ILLNESS
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 ICD, Afib (on coumadin), CVA, HTN, HLD, and DMII who presented to the ED after being hypotensive at his Cardiologist Dr. Sinclair's office. Patient states that for the last few months he has been experiencing dyspnea with very minimal exertion and with daily activities like making his bed. Patient states that in the last month he also began to get some vague chest tightness and shoulder pain at the same time he felt short of breath, and it would also resolve with rest. Patient states initially this was intermittent but now becoming more constant. states when has chest tightness sometimes has burning sensation in chest as well. Patient was supposed to have a nuclear stress today out patient, and was NPO since last night. Patient states he was complaining of lightheadedness and dizziness thsi AM and was found to have a low bp. Patient was then given IV fluids in the doctors office and EMS, and when he arrived in the ED his BP was 119/65. Patient states that his symptoms resolved with the IV fluids. Patient denies any fevers, chills, syncope, PND, LE edema, abdominal pain, N/V/D, or headache.     Patient medications obtained from [facility] per patient request

## 2020-05-29 NOTE — ED PROVIDER NOTE - OBJECTIVE STATEMENT
Pt is an 89yo M with significant cardiac history followed by [facility] presenting from his cardiologists office. He states that he was at his cardiologist's office today to get a stress test when he felt dizzy and lightheaded and was sent to [facility] for eval. He states that he has followed with his cardiologists and has had recent echo/ekg which were normal. He reports that for the past 3-4 months he has been having chest tightness, shoulder blade tightness, shortness of breath, and feeling very weak and tired. He states the symptoms have been worsening as well. He denies any syncope, palpitations, f/c/n/v/d, covid symptoms or contacts.

## 2020-05-29 NOTE — ED ADULT TRIAGE NOTE - CHIEF COMPLAINT QUOTE
Pt with h/o 2 stents has been c/o dizziness and SOB with exertion recently. Went to cardiologist this morning for stress test and was found to be hypotensive in the 60's. was given 1 liter bolus by EMS and SBP in low 90's upon arrival. Denies any chest pain.

## 2020-05-29 NOTE — H&P ADULT - NSHPPHYSICALEXAM_GEN_ALL_CORE
Vital Signs Last 24 Hrs  T(F): 97.4 (29 May 2020 16:46), Max: 98.1 (29 May 2020 10:23)  HR: 68 (29 May 2020 16:46) (63 - 68)  BP: 146/76 (29 May 2020 16:46) (119/65 - 172/72)  RR: 18 (29 May 2020 16:46) (16 - 18)  SpO2: 95% (29 May 2020 16:46) (95% - 98%)    Physical Exam:  Constitutional: NAD, awake and alert, well-developed; +obesity +elderly  Neck: Soft and supple, No LAD, No JVD  Respiratory: cta b/l no wheezing no rhonchi  Cardiovascular: +s1/s2 no edema b/l le  Gastrointestinal: soft nt nd bs+  Vascular: 2+ peripheral pulses  Neurological: A/O x 3, no focal deficits  Musculoskeletal: 5/5 strength b/l upper and lower extremities  : Contraindicated  Breasts: Contraindicated  Rectal: Contraindicated

## 2020-05-29 NOTE — ED PROVIDER NOTE - ATTENDING CONTRIBUTION TO CARE
The patient seen and examined    Chest pain , ACS  Acute renal Injury    I, Juan Acevedo, performed the initial face to face bedside interview with this patient regarding history of present illness, review of symptoms and relevant past medical, social and family history.  I completed an independent physical examination.  I was the initial provider who evaluated this patient. I have signed out the follow up of any pending tests (i.e. labs, radiological studies) to the resident.  I have communicated the patient’s plan of care and disposition with the resident.

## 2020-05-29 NOTE — H&P ADULT - ASSESSMENT
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 ICD, Afib (on coumadin), CVA, HTN, HLD, and DMII who presented to the ED after being hypotensive at his Cardiologist Dr. Sinclair's office. Patient states that for the last few months he has been experiencing dyspnea with very minimal exertion and with daily activities like making his bed. Patient states that in the last month he also began to get some vague chest tightness and shoulder pain at the same time he felt short of breath, and it would also resolve with rest. Patient states initially this was intermittent but now becoming more constant. states when has chest tightness sometimes has burning sensation in chest as well. Patient was supposed to have a nuclear stress today out patient, and was NPO since last night. Patient states he was complaining of lightheadedness and dizziness thsi AM and was found to have a low bp. Patient was then given IV fluids in the doctors office and EMS, and when he arrived in the ED his BP was 119/65. Patient states that his symptoms resolved with the IV fluids. Patient denies any fevers, chills, syncope, PND, LE edema, abdominal pain, N/V/D, or headache.     #dyspnea on exertion  - admit to medicine - tele  - in patient w/ previous stents and cabg   - plavix  - coreg  - abnormal nuclear stress test 05/29/2020-> d/w Dr Berrios will need cath on monday given on coumadin   - check tsh a1c and lipids  - cardiology consult appreciated  - trop x 1 negative     #diastolic congestive heart failure  - euvolemic on exam  - echo 5/27- mild tr and pr ; lvef 36% done outpatient   - echo outpatient on     #hx of cva and afib  - coreg  - hold coumadin   - monitor inr     #HTN - hx of hypotension prior to coming to ED   - monitor blood pressure  - coreg, entresto w/ parameters    #HLD  - statin and fenofibrate    #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  - monitor cr  - avoid nephrotoxic meds  - most probable 2/2 dehydration as was npo  - nephro consult as will be having cath on monday     #type 2 diabetes  - not on meds outpatient  - iss   - monitor fingersticks    #dvt prophylaxis   - lovenox SC  IMPROVE VTE Individual Risk Assessment  RISK                                                                Points  [  ] Previous VTE                                                  3  [  ] Thrombophilia                                               2  [  ] Lower limb paralysis                                      2        (unable to hold up >15 seconds)    [  ] Current Cancer                                              2         (within 6 months)  [  ] Immobilization > 24 hrs                                1  [  ] ICU/CCU stay > 24 hours                              1  [x  ] Age > 60                                                      1    IMPROVE VTE Score _____1____    IMPROVE Score 0-1: Low Risk, No VTE prophylaxis required for most patients, encourage ambulation.   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 ICD, Afib (on coumadin), CVA, HTN, HLD, and DMII who presented to the ED after being hypotensive at his Cardiologist Dr. Sinclair's office. Patient states that for the last few months he has been experiencing dyspnea with very minimal exertion and with daily activities like making his bed. Patient states that in the last month he also began to get some vague chest tightness and shoulder pain at the same time he felt short of breath, and it would also resolve with rest. Patient states initially this was intermittent but now becoming more constant. states when has chest tightness sometimes has burning sensation in chest as well. Patient was supposed to have a nuclear stress today out patient, and was NPO since last night. Patient states he was complaining of lightheadedness and dizziness thsi AM and was found to have a low bp. Patient was then given IV fluids in the doctors office and EMS, and when he arrived in the ED his BP was 119/65. Patient states that his symptoms resolved with the IV fluids. Patient denies any fevers, chills, syncope, PND, LE edema, abdominal pain, N/V/D, or headache.     #dyspnea on exertion  - admit to medicine - tele  - in patient w/ previous stents and cabg   - plavix  - coreg  - abnormal nuclear stress test 05/29/2020-> d/w Dr Berrios will need cath on monday given on coumadin   - check tsh a1c and lipids  - cardiology consult appreciated  - trop x 1 negative     #diastolic congestive heart failure w/ reduced ef   - euvolemic on exam  - echo 5/27- mild tr and pr ; lvef 36% done outpatient   - echo outpatient on   - entresto, coreg    #hx of cva and afib  - coreg  - hold coumadin   - monitor inr     #HTN - hx of hypotension prior to coming to ED   - monitor blood pressure  - coreg, entresto w/ parameters    #HLD  - statin and fenofibrate    #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  - monitor cr  - avoid nephrotoxic meds  - most probable 2/2 dehydration as was npo  - nephro consult as will be having cath on monday     #type 2 diabetes  - not on meds outpatient  - iss   - monitor fingersticks    #dvt prophylaxis   - lovenox SC  IMPROVE VTE Individual Risk Assessment  RISK                                                                Points  [  ] Previous VTE                                                  3  [  ] Thrombophilia                                               2  [  ] Lower limb paralysis                                      2        (unable to hold up >15 seconds)    [  ] Current Cancer                                              2         (within 6 months)  [  ] Immobilization > 24 hrs                                1  [  ] ICU/CCU stay > 24 hours                              1  [x  ] Age > 60                                                      1    IMPROVE VTE Score _____1____    IMPROVE Score 0-1: Low Risk, No VTE prophylaxis required for most patients, encourage ambulation.   IMPROVE Score 2-3: At risk, pharmacologic VTE prophylaxis is indicated for most patients (in the absence of a contraindication)  IMPROVE Score > or = 4: High Risk, pharmacologic VTE prophylaxis is indicated for most patients (in the absence of a contraindication)

## 2020-05-29 NOTE — CONSULT NOTE ADULT - ASSESSMENT
A/P: Patient is a 89 y/o M with a PMHx of MI (1973) then s/p CABG (1996) and PCI (ELOY x 2 to D1 05/2019), ischemic CM HFrEF (EF 35% 06/19) s/p [employer] ICD, Afib (on coumadin), CVA, HTN, HLD, and DMII who presented to the ED after being hypotensive at his Cardiologist Dr. Sinclair's office. Patient states that for the last few months he has been experiencing dyspnea with very minimal exertion and with daily activities like making his bed. Patient states that in the last month he also began to get some vague chest and shoulder pain at the same time he felt short of breath, and it would also resolve with rest. Patient was supposed to have a nuclear stress today, and was NPO since last night. Patient states he was complaining of lightheadedness, and was found to have a BP of 60/40. Patient was given IV fluids in the doctors office and EMS, and when he arrived in the ED his BP was 119/65. Patient states that his symptoms resolved with the IV fluids. Patient denies any fevers, chills, syncope, orthopnea, PND, LE edema, abdominal pain, N/V/D, or headache.   Troponin neg x 1      1. Hypotension  - Likely secondary to hypovolemia from NPO status.   - Symptoms improved with IV fluids.   - Continue to monitor.   - Given NTG paste despite no chest pain? Would remove as with labile BP.     2. CAD  - Patient with hx of CABG s/p ELOY x 2 (D1 05/19) that showed patent LIMA-LAD and SVG-OM with moderate atherosclerosis.   - Patient with worsening dyspnea and anginal symptoms.  - Troponin negative x 1  - Will move forward with Nuclear stress test today.   - Continue Plavix (not on ASA as on coumadin), Lipitor, Coreg, Imdur, Entreso, and fenofibrate.     3. HFrEF  - Patient appears euvolemic.   - Had recent echo in the office, results not in [facility].   - Continue home meds.     Preliminary evaluation, please await official recommendations by Dr. Berrios

## 2020-05-29 NOTE — H&P ADULT - NSHPSOCIALHISTORY_GEN_ALL_CORE
Lives at home alone  Former smoker quit 1971 smoked 1 ppd x 30 years prior   Denies illicit drug use  Social etoh use

## 2020-05-29 NOTE — ED PROVIDER NOTE - CARE PLAN
Principal Discharge DX:	Chest pain  Secondary Diagnosis:	DM (diabetes mellitus)  Secondary Diagnosis:	HTN (hypertension)  Secondary Diagnosis:	HLD (hyperlipidemia)

## 2020-05-29 NOTE — H&P ADULT - NSHPREVIEWOFSYSTEMS_GEN_ALL_CORE
Review of Systems:  CONSTITUTIONAL: No weakness, fevers or chills  EYES/ENT: No visual changes;  No vertigo or throat pain   NECK: No pain or stiffness  RESPIRATORY: No cough, wheezing, hemoptysis; +dyspnea on exertion  CARDIOVASCULAR: +chest pain  GASTROINTESTINAL: No abdominal or epigastric pain. No nausea, vomiting, or hematemesis; No diarrhea or constipation.   GENITOURINARY: No dysuria, frequency or hematuria  NEUROLOGICAL: +dizziness  SKIN: No itching, burning, rashes, or lesions   All other review of systems is negative unless indicated above

## 2020-05-29 NOTE — ED PROVIDER NOTE - PHYSICAL EXAMINATION
General: Well appearing male in no acute distress  HEENT: Normocephalic, atraumatic. Moist mucous membranes.   Eyes: No scleral icterus. EOMI. LUZMA.  Neck:. Soft and supple. Full ROM without pain. No midline tenderness  Cardiac: Palpable pacemaker/defibrillator. Regular rate and regular rhythm. No murmurs, rubs, gallops. Peripheral pulses 2+ and symmetric. No LE edema.  Resp: Lungs CTAB. Speaking in full sentences. No wheezes, rales or rhonchi.  Abd: Soft, non-tender, non-distended. No guarding or rebound. No scars, masses, or lesions.  Back: Spine midline and non-tender. No CVA tenderness.    Skin: No rashes, abrasions, or lacerations.  Neuro: AO x 3. Moves all extremities symmetrically. Motor strength and sensation grossly intact

## 2020-05-29 NOTE — ED PROVIDER NOTE - CLINICAL SUMMARY MEDICAL DECISION MAKING FREE TEXT BOX
Patient presenting from cardiologist's office with worsening ACS symptoms. Cardiology aware and involved in patient's care, at bedside. Patient to be worked up for ACS at this time.

## 2020-05-29 NOTE — CONSULT NOTE ADULT - SUBJECTIVE AND OBJECTIVE BOX
[facility]                                                               Office:  [address] [address]                                                              Telephone: [phone]. Fax:[phone]                                                                        CARDIOLOGY CONSULTATION NOTE                                                                                             Consult requested by:  Dr. Acevedo  Reason for Consultation: Hypotension.  History obtained by: Patient and medical record   obtained: No    Chief complaint:    Patient is a 88y old  Male who presents with a chief complaint of hypotension and dizziness.       HPI: Patient is a 89 y/o M with a PMHx of MI (1973) then s/p CABG (1996) and PCI (ELOY x 2 to D1 05/2019), ischemic CM HFrEF (EF 35% 06/19) s/p [employer] ICD, Afib (on coumadin), CVA, HTN, HLD, and DMII who presented to the ED after being hypotensive at his Cardiologist Dr. Sinclair's office. Patient states that for the last few months he has been experiencing dyspnea with very minimal exertion and with daily activities like making his bed. Patient states that in the last month he also began to get some vague chest and shoulder pain at the same time he felt short of breath, and it would also resolve with rest. Patient was supposed to have a nuclear stress today, and was NPO since last night. Patient states he was complaining of lightheadedness, and was found to have a BP of 60/40. Patient was given IV fluids in the doctors office and EMS, and when he arrived in the ED his BP was 119/65. Patient states that his symptoms resolved with the IV fluids. Patient denies any fevers, chills, syncope, orthopnea, PND, LE edema, abdominal pain, N/V/D, or headache.     REVIEW OF SYMPTOMS:     CONSTITUTIONAL: No fever, weight loss, or fatigue  ENMT:  No difficulty hearing, tinnitus, vertigo; No sinus or throat pain  NECK: No pain or stiffness  CARDIOVASCULAR: AS PER HPI  RESPIRATORY: AS PER HPI  : No dysuria, no hematuria   GI: No dark color stool, no melena, no diarrhea, no constipation, no abdominal pain   NEURO: No headache, no dizziness, no slurred speech   MUSCULOSKELETAL: No joint pain or swelling; No muscle, back, or extremity pain  PSYCH: No agitation, no anxiety.    ALL OTHER REVIEW OF SYSTEMS ARE NEGATIVE.      PREVIOUS DIAGNOSTIC TESTING  ECHO FINDINGS:  Outpatient Echo 06/19  EF 35%, mild MR, mild to mod AI, mildly dilated LA, moderate global LV dysfunction, stage II DD, mild TR.     CATHETERIZATION FINDINGS:   < from: Cardiac Cath Lab - Adult (06.05.19 @ 09:40) >  CORONARY VESSELS: The coronary circulation is left dominant.  LM:   --  Distal left main: There was a 20 % stenosis.  LAD:   --  Proximal LAD: There was a 100 % stenosis.  --  D1: There was a 95 % stenosis.  CX:   --  Proximal circumflex: There was a 80 % stenosis.  --  Mid circumflex: There was a 80 % stenosis.  RCA:   --  RCA: Small non dominant with moderate to severe disease.  GRAFTS:   --  Graft to the LAD: The graft was a LIMA. Graft angiography  showed no evidence of disease.  --  Graft to the 1st obtuse marginal: The graft was a saphenous vein graft.  Graft angiography showed moderate atherosclerosis.  AORTA: Ascending aorta: Normal.  COMPLICATIONS: No complications occurred during the cath lab visit.  DIAGNOSTIC IMPRESSIONS: Patent LIMA to LAD. SVG to OM1 with moderate  disease at the ostium of SVG.  Diffusely disease circumflex system.  After diagnostic imaging, patient noted to have occlusion of the D1 vessel.  ? of ruptured plaque vs thrombus.  PCI performed with 2 ELOY.  DIAGNOSTIC RECOMMENDATIONS: Coumadin and plavix.  No aspirin due to elevated bleeding risk in an elderly patient.  INTERVENTIONAL IMPRESSIONS: Patent LIMA to LAD. SVG to OM1 with moderate  disease at the ostium of SVG.  Diffusely disease circumflex system.  After diagnostic imaging, patient noted to have occlusion of the D1 vessel.  ? of ruptured plaque vs thrombus.  PCI performed with 2 ELOY.  INTERVENTIONAL RECOMMENDATIONS: Coumadin and plavix.  No aspirin due to elevated bleeding risk in an elderly patient.  Prepared and signed by  Alexander Rasmussen MD  Signed 06/11/2019 15:17:47    < end of copied text >      ALLERGIES: Allergies    No Known Allergies    Intolerances      PAST MEDICAL HISTORY  PAF (paroxysmal atrial fibrillation)  CRI (chronic renal insufficiency)  Low back pain  TIA (transient ischemic attack)  HLD (hyperlipidemia)  HTN (hypertension)  DM (diabetes mellitus)  AICD (automatic cardioverter/defibrillator) present  Cardiomyopathy, ischemic  CAD (coronary artery disease)      PAST SURGICAL HISTORY  Closed rib fracture  S/P cataract extraction  S/P hernia repair  S/P tonsillectomy  S/P CABG x 3      FAMILY HISTORY: Non-contributory      SOCIAL HISTORY:  Lives alone  CIGARETTES:   Former smoker, quit 1970s. Smoked 2.5 PPD x 30 years  ALCOHOL: Socially  DRUGS: Denies      CURRENT MEDICATIONS:       HOME CARDIAC MEDICATIONS:  Plavix 75mg PO qday  Lipitor 40mg PO QHS  Entresto 24-26mg PO BID  Coumadin 5mg   Imdur 10mg PO qday  Coreg 12.5mg PO BID  Fenofibrate 160mg PO Qday      Vital Signs Last 24 Hrs  T(C): 36.7 (29 May 2020 10:23), Max: 36.7 (29 May 2020 10:23)  T(F): 98.1 (29 May 2020 10:23), Max: 98.1 (29 May 2020 10:23)  HR: 63 (29 May 2020 12:44) (63 - 65)  BP: 128/69 (29 May 2020 12:44) (119/65 - 172/72)  RR: 18 (29 May 2020 12:44) (16 - 18)  SpO2: 98% (29 May 2020 12:44) (95% - 98%)      PHYSICAL EXAM:  Constitutional: Comfortable . No acute distress.   HEENT: Atraumatic and normocephalic , neck is supple . no JVD. No carotid bruit. PEERL   CNS: A&Ox3. No focal deficits. EOMI. Cranial nerves II-IX are intact.   Lymph Nodes: Cervical : Not palpable.  Respiratory: CTA b/l  Cardiovascular: S1S2 RRR. No rubs or gallop. II/VI systolic murmur lsb  Gastrointestinal: Soft non-tender and non distended . +Bowel sounds. negative Hardin's sign.  Extremities: No edema.   Psychiatric: Calm . no agitation.  Skin: No skin rash/ulcers visualized to face, hands or feet.    Intake and output:     LABS:                        13.2   4.19  )-----------( 145      ( 29 May 2020 11:22 )             41.4     05-29    137  |  103  |  30.0<H>  ----------------------------<  111<H>  5.3   |  21.0<L>  |  1.69<H>    Ca    9.4      29 May 2020 11:22  Mg     1.6     05-29    TPro  6.8  /  Alb  4.5  /  TBili  0.5  /  DBili  x   /  AST  26  /  ALT  21  /  AlkPhos  73  05-29    CARDIAC MARKERS ( 29 May 2020 11:22 )  x     / <0.01 ng/mL / x     / x     / x        ;p-BNP=Serum Pro-Brain Natriuretic Peptide: 799 pg/mL (05-29 @ 11:22)          INTERPRETATION OF TELEMETRY: Reviewed by me. SR/SB  ECG: Reviewed by me. SB, PRWP, T wave inversions anterolaterally     RADIOLOGY & ADDITIONAL STUDIES:    X-ray:  reviewed by me.   < from: Xray Chest 1 View-PORTABLE IMMEDIATE (05.29.20 @ 12:29) >   EXAM:  XR CHEST PORTABLE IMMED 1V                          PROCEDURE DATE:  05/29/2020          INTERPRETATION:  AP chest on May 29, 2020 12:01 PM. Patient has chest pain and hypotension.    Heart is shows normal size. Sternotomy and left-sided defibrillator again noted.    The lung fields and pleural surfaces are unremarkable.    Chest is similar to March 11 of this year.    IMPRESSION: No acute finding or change. Sternotomy and defibrillator again noted.    < end of copied text >

## 2020-05-29 NOTE — CONSULT NOTE ADULT - ATTENDING COMMENTS
hypotension. Unclear cause.  Probably dehydrated or vaso vagal.  dyspnea on exertion : prior coronary artery disease and CABG. Worsening symptoms  Ischemic cardiomyopathy :   stress test shows mild ischemia in distal LAD and scar with minimal lacey infarct in RCA territory.   Discussed with Dr. Sinclair .  Cath films discussed with dr. Rasmussen.  INR was 2.4   Will hold anticoagulation . cardiac cath on monday.   Add ranexa 500 BID.  f.u echo.  ct plavix. staint. beta-blocker and Imdur.

## 2020-05-29 NOTE — ED PROVIDER NOTE - NS ED ROS FT
General: Denies fever, chills  HEENT: Denies sensory changes, sore throat  Neck: Denies neck pain, neck stiffness  Resp: Endorses SOB  Cardiovascular: Endorses CP, palpitations. Denies LE edema.   GI: Denies nausea, vomiting, abdominal pain, diarrhea, constipation, blood in stool  : Denies dysuria, hematuria, frequency, incontinence  MSK: Denies back pain  Neuro: Endorses weakness, tired, dizziness. Denies HA, numbness  Skin: Denies rashes

## 2020-05-30 LAB
A1C WITH ESTIMATED AVERAGE GLUCOSE RESULT: 5.8 % — HIGH (ref 4–5.6)
ANION GAP SERPL CALC-SCNC: 15 MMOL/L — SIGNIFICANT CHANGE UP (ref 5–17)
APPEARANCE UR: CLEAR — SIGNIFICANT CHANGE UP
APPEARANCE UR: CLEAR — SIGNIFICANT CHANGE UP
APTT BLD: 52.5 SEC — HIGH (ref 27.5–36.3)
BACTERIA # UR AUTO: ABNORMAL
BACTERIA # UR AUTO: NEGATIVE — SIGNIFICANT CHANGE UP
BILIRUB UR-MCNC: NEGATIVE — SIGNIFICANT CHANGE UP
BILIRUB UR-MCNC: NEGATIVE — SIGNIFICANT CHANGE UP
BUN SERPL-MCNC: 30 MG/DL — HIGH (ref 8–20)
CALCIUM SERPL-MCNC: 9.8 MG/DL — SIGNIFICANT CHANGE UP (ref 8.6–10.2)
CHLORIDE SERPL-SCNC: 103 MMOL/L — SIGNIFICANT CHANGE UP (ref 98–107)
CHOLEST SERPL-MCNC: 110 MG/DL — SIGNIFICANT CHANGE UP (ref 110–199)
CO2 SERPL-SCNC: 22 MMOL/L — SIGNIFICANT CHANGE UP (ref 22–29)
COLOR SPEC: YELLOW — SIGNIFICANT CHANGE UP
COLOR SPEC: YELLOW — SIGNIFICANT CHANGE UP
COMMENT - URINE: SIGNIFICANT CHANGE UP
CREAT SERPL-MCNC: 1.34 MG/DL — HIGH (ref 0.5–1.3)
DIFF PNL FLD: ABNORMAL
DIFF PNL FLD: NEGATIVE — SIGNIFICANT CHANGE UP
EPI CELLS # UR: SIGNIFICANT CHANGE UP
EPI CELLS # UR: SIGNIFICANT CHANGE UP
ESTIMATED AVERAGE GLUCOSE: 120 MG/DL — HIGH (ref 68–114)
GLUCOSE BLDC GLUCOMTR-MCNC: 113 MG/DL — HIGH (ref 70–99)
GLUCOSE BLDC GLUCOMTR-MCNC: 130 MG/DL — HIGH (ref 70–99)
GLUCOSE SERPL-MCNC: 109 MG/DL — HIGH (ref 70–99)
GLUCOSE UR QL: NEGATIVE MG/DL — SIGNIFICANT CHANGE UP
GLUCOSE UR QL: NEGATIVE MG/DL — SIGNIFICANT CHANGE UP
HCT VFR BLD CALC: 42.7 % — SIGNIFICANT CHANGE UP (ref 39–50)
HDLC SERPL-MCNC: 35 MG/DL — LOW
HGB BLD-MCNC: 13.8 G/DL — SIGNIFICANT CHANGE UP (ref 13–17)
INR BLD: 2.72 RATIO — HIGH (ref 0.88–1.16)
KETONES UR-MCNC: NEGATIVE — SIGNIFICANT CHANGE UP
KETONES UR-MCNC: NEGATIVE — SIGNIFICANT CHANGE UP
LEUKOCYTE ESTERASE UR-ACNC: ABNORMAL
LEUKOCYTE ESTERASE UR-ACNC: ABNORMAL
LIPID PNL WITH DIRECT LDL SERPL: 49 MG/DL — SIGNIFICANT CHANGE UP
MCHC RBC-ENTMCNC: 29.5 PG — SIGNIFICANT CHANGE UP (ref 27–34)
MCHC RBC-ENTMCNC: 32.3 GM/DL — SIGNIFICANT CHANGE UP (ref 32–36)
MCV RBC AUTO: 91.2 FL — SIGNIFICANT CHANGE UP (ref 80–100)
NITRITE UR-MCNC: NEGATIVE — SIGNIFICANT CHANGE UP
NITRITE UR-MCNC: NEGATIVE — SIGNIFICANT CHANGE UP
PH UR: 6 — SIGNIFICANT CHANGE UP (ref 5–8)
PH UR: 6 — SIGNIFICANT CHANGE UP (ref 5–8)
PLATELET # BLD AUTO: 150 K/UL — SIGNIFICANT CHANGE UP (ref 150–400)
POTASSIUM SERPL-MCNC: 4.8 MMOL/L — SIGNIFICANT CHANGE UP (ref 3.5–5.3)
POTASSIUM SERPL-SCNC: 4.8 MMOL/L — SIGNIFICANT CHANGE UP (ref 3.5–5.3)
PROT UR-MCNC: NEGATIVE MG/DL — SIGNIFICANT CHANGE UP
PROT UR-MCNC: NEGATIVE MG/DL — SIGNIFICANT CHANGE UP
PROTHROM AB SERPL-ACNC: 31.7 SEC — HIGH (ref 10–12.9)
RBC # BLD: 4.68 M/UL — SIGNIFICANT CHANGE UP (ref 4.2–5.8)
RBC # FLD: 13.5 % — SIGNIFICANT CHANGE UP (ref 10.3–14.5)
RBC CASTS # UR COMP ASSIST: NEGATIVE /HPF — SIGNIFICANT CHANGE UP (ref 0–4)
RBC CASTS # UR COMP ASSIST: SIGNIFICANT CHANGE UP /HPF (ref 0–4)
SARS-COV-2 RNA SPEC QL NAA+PROBE: SIGNIFICANT CHANGE UP
SODIUM SERPL-SCNC: 140 MMOL/L — SIGNIFICANT CHANGE UP (ref 135–145)
SP GR SPEC: 1.01 — SIGNIFICANT CHANGE UP (ref 1.01–1.02)
SP GR SPEC: 1.01 — SIGNIFICANT CHANGE UP (ref 1.01–1.02)
TOTAL CHOLESTEROL/HDL RATIO MEASUREMENT: 3 RATIO — LOW (ref 3.4–9.6)
TRIGL SERPL-MCNC: 132 MG/DL — SIGNIFICANT CHANGE UP (ref 10–200)
TSH SERPL-MCNC: 1.47 UIU/ML — SIGNIFICANT CHANGE UP (ref 0.27–4.2)
UROBILINOGEN FLD QL: NEGATIVE MG/DL — SIGNIFICANT CHANGE UP
UROBILINOGEN FLD QL: NEGATIVE MG/DL — SIGNIFICANT CHANGE UP
WBC # BLD: 3.8 K/UL — SIGNIFICANT CHANGE UP (ref 3.8–10.5)
WBC # FLD AUTO: 3.8 K/UL — SIGNIFICANT CHANGE UP (ref 3.8–10.5)
WBC UR QL: ABNORMAL
WBC UR QL: SIGNIFICANT CHANGE UP

## 2020-05-30 PROCEDURE — 99232 SBSQ HOSP IP/OBS MODERATE 35: CPT

## 2020-05-30 RX ADMIN — Medication 1 MILLIGRAM(S): at 11:09

## 2020-05-30 RX ADMIN — Medication 300 MILLIGRAM(S): at 11:09

## 2020-05-30 RX ADMIN — QUETIAPINE FUMARATE 12.5 MILLIGRAM(S): 200 TABLET, FILM COATED ORAL at 21:46

## 2020-05-30 RX ADMIN — ENOXAPARIN SODIUM 40 MILLIGRAM(S): 100 INJECTION SUBCUTANEOUS at 11:09

## 2020-05-30 RX ADMIN — CARVEDILOL PHOSPHATE 12.5 MILLIGRAM(S): 80 CAPSULE, EXTENDED RELEASE ORAL at 05:38

## 2020-05-30 RX ADMIN — Medication 145 MILLIGRAM(S): at 11:09

## 2020-05-30 RX ADMIN — ATORVASTATIN CALCIUM 40 MILLIGRAM(S): 80 TABLET, FILM COATED ORAL at 21:46

## 2020-05-30 RX ADMIN — SACUBITRIL AND VALSARTAN 1 TABLET(S): 24; 26 TABLET, FILM COATED ORAL at 17:49

## 2020-05-30 RX ADMIN — SACUBITRIL AND VALSARTAN 1 TABLET(S): 24; 26 TABLET, FILM COATED ORAL at 05:38

## 2020-05-30 RX ADMIN — CLOPIDOGREL BISULFATE 75 MILLIGRAM(S): 75 TABLET, FILM COATED ORAL at 11:09

## 2020-05-30 RX ADMIN — CARVEDILOL PHOSPHATE 12.5 MILLIGRAM(S): 80 CAPSULE, EXTENDED RELEASE ORAL at 17:50

## 2020-05-30 NOTE — PROGRESS NOTE ADULT - ASSESSMENT
89 yo M w/ hx MI (1973) then s/p CABG (1996), PCI (ELOY x 2 to D1 05/2019), ischemic CM (EF 35% 06/19) s/p [employer] ICD, Afib (on coumadin), CVA, HTN, HLD, and DMII who presented to the ED after being hypotensive at his Cardiologist Dr. Sinclair's office. He was NPO for stress test that am and felt lightheaded.  had prior episodes of LH in past. Patient states that for the last few months he has been experiencing dyspnea with very minimal exertion and with daily activities.      hypotension--unclear etiology, dehydration?   + stress test, planned for cath Monday---hold coumadin.    CAD: c/w plavix, coreg and lipitor.    Afib: hold coumadin    chronic HFrEF: seems euvolemic    c/w entresto    had outpatient echo prior to ER arrival    LEANDER on CKD3?: monitor post IVF    dm2: dc sliding scale, hga1c 5.8

## 2020-05-30 NOTE — PROGRESS NOTE ADULT - SUBJECTIVE AND OBJECTIVE BOX
seen for hypotension    no acute complaints/events  no cp/sob/LH  ros negative     MEDICATIONS  (STANDING):  allopurinol 300 milliGRAM(s) Oral daily  atorvastatin 40 milliGRAM(s) Oral at bedtime  carvedilol 12.5 milliGRAM(s) Oral every 12 hours  clopidogrel Tablet 75 milliGRAM(s) Oral daily  dextrose 5%. 1000 milliLiter(s) (50 mL/Hr) IV Continuous <Continuous>  dextrose 50% Injectable 12.5 Gram(s) IV Push once  dextrose 50% Injectable 25 Gram(s) IV Push once  dextrose 50% Injectable 25 Gram(s) IV Push once  enoxaparin Injectable 40 milliGRAM(s) SubCutaneous daily  fenofibrate Tablet 145 milliGRAM(s) Oral daily  folic acid 1 milliGRAM(s) Oral daily  insulin lispro (HumaLOG) corrective regimen sliding scale   SubCutaneous three times a day before meals  insulin lispro (HumaLOG) corrective regimen sliding scale   SubCutaneous at bedtime  QUEtiapine 12.5 milliGRAM(s) Oral at bedtime  sacubitril 24 mG/valsartan 26 mG 1 Tablet(s) Oral two times a day    MEDICATIONS  (PRN):  dextrose 40% Gel 15 Gram(s) Oral once PRN Blood Glucose LESS THAN 70 milliGRAM(s)/deciliter  glucagon  Injectable 1 milliGRAM(s) IntraMuscular once PRN Glucose LESS THAN 70 milligrams/deciliter      Allergies    No Known Allergies    Vital Signs Last 24 Hrs  T(C): 36.9 (30 May 2020 08:15), Max: 36.9 (30 May 2020 08:15)  T(F): 98.4 (30 May 2020 08:15), Max: 98.4 (30 May 2020 08:15)  HR: 66 (30 May 2020 11:06) (63 - 81)  BP: 120/70 (30 May 2020 11:06) (104/65 - 146/76)  BP(mean): 91 (30 May 2020 04:57) (91 - 91)  RR: 18 (30 May 2020 08:15) (16 - 18)  SpO2: 95% (30 May 2020 08:15) (95% - 98%)    PHYSICAL EXAM:    GENERAL: NAD  CHEST/LUNG: Clear to percussion bilaterally  HEART: Regular rate and rhythm; S1 S2  ABDOMEN: Soft, Nontender,  Bowel sounds present  EXTREMITIES:  no edema   NERVOUS SYSTEM:  Alert & Oriented X3, Motor Strength 5/5 B/L upper and lower extremities  PSYCH: normal mood, appropriate response.    LABS:                        13.8   3.80  )-----------( 150      ( 30 May 2020 06:44 )             42.7     05    140  |  103  |  30.0<H>  ----------------------------<  109<H>  4.8   |  22.0  |  1.34<H>    Ca    9.8      30 May 2020 06:44  Mg     1.6         TPro  6.8  /  Alb  4.5  /  TBili  0.5  /  DBili  x   /  AST  26  /  ALT  21  /  AlkPhos  73  29    PT/INR - ( 30 May 2020 06:44 )   PT: 31.7 sec;   INR: 2.72 ratio         PTT - ( 30 May 2020 06:44 )  PTT:52.5 sec  Urinalysis Basic - ( 30 May 2020 10:31 )    Color: Yellow / Appearance: Clear / S.010 / pH: x  Gluc: x / Ketone: Negative  / Bili: Negative / Urobili: Negative mg/dL   Blood: x / Protein: Negative mg/dL / Nitrite: Negative   Leuk Esterase: Moderate / RBC: Negative /HPF / WBC 3-5   Sq Epi: x / Non Sq Epi: Occasional / Bacteria: Negative        CAPILLARY BLOOD GLUCOSE      POCT Blood Glucose.: 113 mg/dL (30 May 2020 11:03)  POCT Blood Glucose.: 130 mg/dL (30 May 2020 07:53)  POCT Blood Glucose.: 103 mg/dL (29 May 2020 21:14)  POCT Blood Glucose.: 113 mg/dL (29 May 2020 18:05)        RADIOLOGY & ADDITIONAL TESTS:

## 2020-05-31 LAB
ANION GAP SERPL CALC-SCNC: 14 MMOL/L — SIGNIFICANT CHANGE UP (ref 5–17)
APTT BLD: 47 SEC — HIGH (ref 27.5–36.3)
BUN SERPL-MCNC: 29 MG/DL — HIGH (ref 8–20)
CALCIUM SERPL-MCNC: 9.3 MG/DL — SIGNIFICANT CHANGE UP (ref 8.6–10.2)
CHLORIDE SERPL-SCNC: 105 MMOL/L — SIGNIFICANT CHANGE UP (ref 98–107)
CO2 SERPL-SCNC: 22 MMOL/L — SIGNIFICANT CHANGE UP (ref 22–29)
CREAT SERPL-MCNC: 1.4 MG/DL — HIGH (ref 0.5–1.3)
CULTURE RESULTS: SIGNIFICANT CHANGE UP
GLUCOSE SERPL-MCNC: 92 MG/DL — SIGNIFICANT CHANGE UP (ref 70–99)
HCT VFR BLD CALC: 40 % — SIGNIFICANT CHANGE UP (ref 39–50)
HGB BLD-MCNC: 13 G/DL — SIGNIFICANT CHANGE UP (ref 13–17)
INR BLD: 2.19 RATIO — HIGH (ref 0.88–1.16)
MCHC RBC-ENTMCNC: 29.4 PG — SIGNIFICANT CHANGE UP (ref 27–34)
MCHC RBC-ENTMCNC: 32.5 GM/DL — SIGNIFICANT CHANGE UP (ref 32–36)
MCV RBC AUTO: 90.5 FL — SIGNIFICANT CHANGE UP (ref 80–100)
PLATELET # BLD AUTO: 134 K/UL — LOW (ref 150–400)
POTASSIUM SERPL-MCNC: 4.3 MMOL/L — SIGNIFICANT CHANGE UP (ref 3.5–5.3)
POTASSIUM SERPL-SCNC: 4.3 MMOL/L — SIGNIFICANT CHANGE UP (ref 3.5–5.3)
PROTHROM AB SERPL-ACNC: 25.3 SEC — HIGH (ref 10–12.9)
RBC # BLD: 4.42 M/UL — SIGNIFICANT CHANGE UP (ref 4.2–5.8)
RBC # FLD: 13.6 % — SIGNIFICANT CHANGE UP (ref 10.3–14.5)
SODIUM SERPL-SCNC: 141 MMOL/L — SIGNIFICANT CHANGE UP (ref 135–145)
SPECIMEN SOURCE: SIGNIFICANT CHANGE UP
WBC # BLD: 3.88 K/UL — SIGNIFICANT CHANGE UP (ref 3.8–10.5)
WBC # FLD AUTO: 3.88 K/UL — SIGNIFICANT CHANGE UP (ref 3.8–10.5)

## 2020-05-31 PROCEDURE — 99232 SBSQ HOSP IP/OBS MODERATE 35: CPT

## 2020-05-31 PROCEDURE — 76775 US EXAM ABDO BACK WALL LIM: CPT | Mod: 26

## 2020-05-31 RX ORDER — SODIUM CHLORIDE 9 MG/ML
1000 INJECTION INTRAMUSCULAR; INTRAVENOUS; SUBCUTANEOUS
Refills: 0 | Status: DISCONTINUED | OUTPATIENT
Start: 2020-05-31 | End: 2020-06-02

## 2020-05-31 RX ADMIN — Medication 145 MILLIGRAM(S): at 08:57

## 2020-05-31 RX ADMIN — SACUBITRIL AND VALSARTAN 1 TABLET(S): 24; 26 TABLET, FILM COATED ORAL at 04:45

## 2020-05-31 RX ADMIN — CARVEDILOL PHOSPHATE 12.5 MILLIGRAM(S): 80 CAPSULE, EXTENDED RELEASE ORAL at 17:00

## 2020-05-31 RX ADMIN — ATORVASTATIN CALCIUM 40 MILLIGRAM(S): 80 TABLET, FILM COATED ORAL at 21:46

## 2020-05-31 RX ADMIN — Medication 300 MILLIGRAM(S): at 08:57

## 2020-05-31 RX ADMIN — ENOXAPARIN SODIUM 40 MILLIGRAM(S): 100 INJECTION SUBCUTANEOUS at 08:57

## 2020-05-31 RX ADMIN — Medication 1 MILLIGRAM(S): at 08:57

## 2020-05-31 RX ADMIN — SODIUM CHLORIDE 100 MILLILITER(S): 9 INJECTION INTRAMUSCULAR; INTRAVENOUS; SUBCUTANEOUS at 18:30

## 2020-05-31 RX ADMIN — QUETIAPINE FUMARATE 12.5 MILLIGRAM(S): 200 TABLET, FILM COATED ORAL at 21:46

## 2020-05-31 RX ADMIN — CLOPIDOGREL BISULFATE 75 MILLIGRAM(S): 75 TABLET, FILM COATED ORAL at 08:57

## 2020-05-31 RX ADMIN — CARVEDILOL PHOSPHATE 12.5 MILLIGRAM(S): 80 CAPSULE, EXTENDED RELEASE ORAL at 04:45

## 2020-05-31 NOTE — CONSULT NOTE ADULT - ASSESSMENT
CKD(III): +DM +HTN  Now scheduled for cardiac cath tomorrow at increased risk for RCN  - hold Entresto  - avoid potential nephrotoxins  - will begin IVNS today for renal prophylaxis  - pt (and son via telephone) aware of renal risks inherent to the procedure which could possibly result in (permanent) worsening of his renal function and possible need for dialysis (which may also be permanent)  - check renal sonogram

## 2020-05-31 NOTE — PROGRESS NOTE ADULT - ASSESSMENT
87 yo M w/ hx MI (1973) then s/p CABG (1996), PCI (ELOY x 2 to D1 05/2019), ischemic CM (EF 35% 06/19) s/p [employer] ICD, Afib (on coumadin), CVA, HTN, HLD, and DMII who presented to the ED after being hypotensive at his Cardiologist Dr. Sinclair's office. He was NPO for stress test that am and felt lightheaded.  had prior episodes of LH in past. Patient states that for the last few months he has been experiencing dyspnea with very minimal exertion and with daily activities.      hypotension--unclear etiology, dehydration?   + stress test, planned for cath Monday---hold coumadin.    CAD: c/w plavix, coreg and lipitor.    Afib: hold coumadin    chronic HFrEF: seems euvolemic    c/w entresto    had outpatient echo prior to ER arrival    LEANDER on CKD3?: monitor post IVF    renal consulted    dm2: dc sliding scale, hga1c 5.8    change to any monitored bed.

## 2020-05-31 NOTE — CONSULT NOTE ADULT - SUBJECTIVE AND OBJECTIVE BOX
HPI: 88 year old male with PMHx of MI () then s/p CABG () and PCI (ELOY x 2 to D1 2019), ischemic CM HFrEF (EF 35% ) s/p [employer] ICD, Afib (on coumadin), CVA, HTN, HLD, and DMII who presented to the ED after hypotensive event at cardiologist's office today.  In recent past he was experiencing sob and being evaluated for cardiac etiology.  He is now being scheduled for cardiac cath tomorrow and we are called for CKD and concerns re: RCN post procedure. Pt aware of renal disease in past but never has been formally evaluated by a nephrologist.      PAST MEDICAL & SURGICAL HISTORY:  PAF (paroxysmal atrial fibrillation)  CRI (chronic renal insufficiency)  Low back pain: chronic, receiving epidurals  TIA (transient ischemic attack)  HLD (hyperlipidemia)  HTN (hypertension)  DM (diabetes mellitus): diet controlled per pt  AICD (automatic cardioverter/defibrillator) present  Cardiomyopathy, ischemic  CAD (coronary artery disease)  Closed rib fracture  S/P cataract extraction  S/P hernia repair: inguinal  S/P tonsillectomy  S/P CABG x 3:  [facility]      FAMILY HISTORY:  FH: myocardial infarction  FH: CHF (congestive heart failure)      Social History:  No current tobacco    MEDICATIONS  (STANDING):  allopurinol 300 milliGRAM(s) Oral daily  atorvastatin 40 milliGRAM(s) Oral at bedtime  carvedilol 12.5 milliGRAM(s) Oral every 12 hours  clopidogrel Tablet 75 milliGRAM(s) Oral daily  enoxaparin Injectable 40 milliGRAM(s) SubCutaneous daily  fenofibrate Tablet 145 milliGRAM(s) Oral daily  folic acid 1 milliGRAM(s) Oral daily  QUEtiapine 12.5 milliGRAM(s) Oral at bedtime  sacubitril 24 mG/valsartan 26 mG 1 Tablet(s) Oral two times a day    MEDICATIONS  (PRN):  glucagon  Injectable 1 milliGRAM(s) IntraMuscular once PRN Glucose LESS THAN 70 milligrams/deciliter      Allergies    No Known Allergies    Intolerances        REVIEW OF SYSTEMS:    CONSTITUTIONAL: No fever, weight loss, + fatigue  EYES: No eye pain, visual disturbances, or discharge  ENMT:  No difficulty hearing, tinnitus, vertigo; No sinus or throat pain  NECK: No pain or stiffness  BREASTS: No pain, masses, or nipple discharge  RESPIRATORY: No cough, wheezing, chills or hemoptysis; + shortness of breath  CARDIOVASCULAR: No chest pain, palpitations; + dizziness; no leg swelling  GASTROINTESTINAL: No abdominal or epigastric pain. No nausea, vomiting, or hematemesis; No diarrhea or constipation. No melena or hematochezia.  GENITOURINARY: No dysuria, frequency, hematuria, or incontinence  NEUROLOGICAL: No headaches, memory loss, loss of strength, numbness, or tremors  SKIN: No itching, burning, rashes, or lesions   ENDOCRINE: No heat or cold intolerance; No hair loss  MUSCULOSKELETAL: No joint pain or swelling; No muscle, back, or extremity pain        Vital Signs Last 24 Hrs  T(C): 36.1 (31 May 2020 10:43), Max: 36.7 (30 May 2020 17:48)  T(F): 97 (31 May 2020 10:43), Max: 98 (30 May 2020 17:48)  HR: 66 (31 May 2020 10:43) (66 - 76)  BP: 113/61 (31 May 2020 10:43) (104/55 - 130/62)  BP(mean): --  RR: 18 (31 May 2020 10:43) (18 - 18)  SpO2: 98% (31 May 2020 10:43) (96% - 98%)    PHYSICAL EXAM:    GENERAL: NAD, comfortable  HEAD:  Atraumatic, Normocephalic  EYES: EOMI, PERRLA, conjunctiva and sclera clear  ENMT: Moist mucous membranes  NECK: Supple, No JVD, Normal thyroid  NERVOUS SYSTEM:  Alert & Oriented X3, intact and symmetric  CHEST/LUNG: Clear bilaterally  HEART: Regular rate and rhythm; No rub  ABDOMEN: Soft, Nontender, Nondistended; BS+  EXTREMITIES:  2+ Peripheral Pulses, No LE edema  SKIN: No rashes or lesions      LABS:                        13.0   3.88  )-----------( 134      ( 31 May 2020 07:09 )             40.0     05-    141  |  105  |  29.0<H>  ----------------------------<  92  4.3   |  22.0  |  1.40<H>    Creatinine, Serum: 1.15 mg/dL (19 @ 05:21)    Creatinine, Serum: 1.08 mg/dL (19 @ 15:18)    Creatinine, Serum: 1.56 mg/dL (14 @ 05:47)        Ca    9.3      31 May 2020 07:09      PT/INR - ( 31 May 2020 07:09 )   PT: 25.3 sec;   INR: 2.19 ratio         PTT - ( 31 May 2020 07:09 )  PTT:47.0 sec  Urinalysis Basic - ( 30 May 2020 10:31 )    Color: Yellow / Appearance: Clear / S.010 / pH: x  Gluc: x / Ketone: Negative  / Bili: Negative / Urobili: Negative mg/dL   Blood: x / Protein: Negative mg/dL / Nitrite: Negative   Leuk Esterase: Moderate / RBC: Negative /HPF / WBC 3-5   Sq Epi: x / Non Sq Epi: Occasional / Bacteria: Negative          RADIOLOGY & ADDITIONAL TESTS:  < from: Xray Chest 1 View-PORTABLE IMMEDIATE (20 @ 12:29) >   EXAM:  XR CHEST PORTABLE IMMED 1V                          PROCEDURE DATE:  2020          INTERPRETATION:  AP chest on May 29, 2020 12:01 PM. Patient has chest pain and hypotension.    Heart is shows normal size. Sternotomy and left-sided defibrillator again noted.    The lung fields and pleural surfaces are unremarkable.    Chest is similar to  of this year.    IMPRESSION: No acute finding or change. Sternotomy and defibrillator again noted.    < end of copied text >

## 2020-05-31 NOTE — PROGRESS NOTE ADULT - SUBJECTIVE AND OBJECTIVE BOX
seen for hypotension, +stress test    no acute complaints/events  still waiting in ER for inpt bed  ros otherwise negative    MEDICATIONS  (STANDING):  allopurinol 300 milliGRAM(s) Oral daily  atorvastatin 40 milliGRAM(s) Oral at bedtime  carvedilol 12.5 milliGRAM(s) Oral every 12 hours  clopidogrel Tablet 75 milliGRAM(s) Oral daily  enoxaparin Injectable 40 milliGRAM(s) SubCutaneous daily  fenofibrate Tablet 145 milliGRAM(s) Oral daily  folic acid 1 milliGRAM(s) Oral daily  QUEtiapine 12.5 milliGRAM(s) Oral at bedtime  sacubitril 24 mG/valsartan 26 mG 1 Tablet(s) Oral two times a day    MEDICATIONS  (PRN):  glucagon  Injectable 1 milliGRAM(s) IntraMuscular once PRN Glucose LESS THAN 70 milligrams/deciliter      Allergies    No Known Allergies    Vital Signs Last 24 Hrs  T(C): 36.7 (30 May 2020 17:48), Max: 36.7 (30 May 2020 17:48)  T(F): 98 (30 May 2020 17:48), Max: 98 (30 May 2020 17:48)  HR: 76 (31 May 2020 08:03) (66 - 76)  BP: 113/56 (31 May 2020 08:03) (104/55 - 130/62)  BP(mean): --  RR: 18 (31 May 2020 08:03) (18 - 18)  SpO2: 96% (31 May 2020 08:03) (96% - 96%)    PHYSICAL EXAM:    GENERAL: NAD  CHEST/LUNG: Clear to percussion bilaterall  HEART: Regular rate and rhythm; S1 S2  ABDOMEN: Soft, Bowel sounds present  EXTREMITIES:  no edema   NERVOUS SYSTEM:  Alert & Oriented X3, Motor Strength 5/5 B/L upper and lower extremities      LABS:                        13.0   3.88  )-----------( 134      ( 31 May 2020 07:09 )             40.0     05-31    141  |  105  |  29.0<H>  ----------------------------<  92  4.3   |  22.0  |  1.40<H>    Ca    9.3      31 May 2020 07:09  Mg     1.6     05-29    TPro  6.8  /  Alb  4.5  /  TBili  0.5  /  DBili  x   /  AST  26  /  ALT  21  /  AlkPhos  73  05-29    PT/INR - ( 31 May 2020 07:09 )   PT: 25.3 sec;   INR: 2.19 ratio         PTT - ( 31 May 2020 07:09 )  PTT:47.0 sec  Urinalysis Basic - ( 30 May 2020 10:31 )    Color: Yellow / Appearance: Clear / S.010 / pH: x  Gluc: x / Ketone: Negative  / Bili: Negative / Urobili: Negative mg/dL   Blood: x / Protein: Negative mg/dL / Nitrite: Negative   Leuk Esterase: Moderate / RBC: Negative /HPF / WBC 3-5   Sq Epi: x / Non Sq Epi: Occasional / Bacteria: Negative        CAPILLARY BLOOD GLUCOSE      POCT Blood Glucose.: 113 mg/dL (30 May 2020 11:03)        RADIOLOGY & ADDITIONAL TESTS:

## 2020-06-01 ENCOUNTER — TRANSCRIPTION ENCOUNTER (OUTPATIENT)
Age: 85
End: 2020-06-01

## 2020-06-01 LAB
ANION GAP SERPL CALC-SCNC: 11 MMOL/L — SIGNIFICANT CHANGE UP (ref 5–17)
APTT BLD: 40.4 SEC — HIGH (ref 27.5–36.3)
BUN SERPL-MCNC: 31 MG/DL — HIGH (ref 8–20)
CALCIUM SERPL-MCNC: 9.2 MG/DL — SIGNIFICANT CHANGE UP (ref 8.6–10.2)
CHLORIDE SERPL-SCNC: 105 MMOL/L — SIGNIFICANT CHANGE UP (ref 98–107)
CO2 SERPL-SCNC: 24 MMOL/L — SIGNIFICANT CHANGE UP (ref 22–29)
CREAT SERPL-MCNC: 1.48 MG/DL — HIGH (ref 0.5–1.3)
GLUCOSE SERPL-MCNC: 110 MG/DL — HIGH (ref 70–99)
INR BLD: 1.69 RATIO — HIGH (ref 0.88–1.16)
POTASSIUM SERPL-MCNC: 4.5 MMOL/L — SIGNIFICANT CHANGE UP (ref 3.5–5.3)
POTASSIUM SERPL-SCNC: 4.5 MMOL/L — SIGNIFICANT CHANGE UP (ref 3.5–5.3)
PROTHROM AB SERPL-ACNC: 19.4 SEC — HIGH (ref 10–12.9)
SODIUM SERPL-SCNC: 140 MMOL/L — SIGNIFICANT CHANGE UP (ref 135–145)

## 2020-06-01 PROCEDURE — 92978 ENDOLUMINL IVUS OCT C 1ST: CPT | Mod: 26,LC

## 2020-06-01 PROCEDURE — 99152 MOD SED SAME PHYS/QHP 5/>YRS: CPT

## 2020-06-01 PROCEDURE — 99233 SBSQ HOSP IP/OBS HIGH 50: CPT

## 2020-06-01 PROCEDURE — 93010 ELECTROCARDIOGRAM REPORT: CPT

## 2020-06-01 PROCEDURE — 99232 SBSQ HOSP IP/OBS MODERATE 35: CPT | Mod: 25

## 2020-06-01 PROCEDURE — 92937 PRQ TRLUML REVSC CAB GRF 1: CPT | Mod: 26,LC

## 2020-06-01 PROCEDURE — 93459 L HRT ART/GRFT ANGIO: CPT | Mod: 26,XU

## 2020-06-01 PROCEDURE — 99232 SBSQ HOSP IP/OBS MODERATE 35: CPT

## 2020-06-01 RX ORDER — NITROGLYCERIN 6.5 MG
0.4 CAPSULE, EXTENDED RELEASE ORAL ONCE
Refills: 0 | Status: COMPLETED | OUTPATIENT
Start: 2020-06-01 | End: 2020-06-01

## 2020-06-01 RX ORDER — ASPIRIN/CALCIUM CARB/MAGNESIUM 324 MG
81 TABLET ORAL DAILY
Refills: 0 | Status: DISCONTINUED | OUTPATIENT
Start: 2020-06-01 | End: 2020-06-01

## 2020-06-01 RX ORDER — WARFARIN SODIUM 2.5 MG/1
5 TABLET ORAL ONCE
Refills: 0 | Status: COMPLETED | OUTPATIENT
Start: 2020-06-01 | End: 2020-06-01

## 2020-06-01 RX ORDER — NITROGLYCERIN 6.5 MG
1 CAPSULE, EXTENDED RELEASE ORAL ONCE
Refills: 0 | Status: DISCONTINUED | OUTPATIENT
Start: 2020-06-01 | End: 2020-06-01

## 2020-06-01 RX ORDER — KETOROLAC TROMETHAMINE 30 MG/ML
30 SYRINGE (ML) INJECTION ONCE
Refills: 0 | Status: DISCONTINUED | OUTPATIENT
Start: 2020-06-01 | End: 2020-06-01

## 2020-06-01 RX ORDER — ISOSORBIDE MONONITRATE 60 MG/1
30 TABLET, EXTENDED RELEASE ORAL DAILY
Refills: 0 | Status: DISCONTINUED | OUTPATIENT
Start: 2020-06-01 | End: 2020-06-02

## 2020-06-01 RX ORDER — ASPIRIN/CALCIUM CARB/MAGNESIUM 324 MG
81 TABLET ORAL ONCE
Refills: 0 | Status: COMPLETED | OUTPATIENT
Start: 2020-06-01 | End: 2020-06-01

## 2020-06-01 RX ORDER — FENTANYL CITRATE 50 UG/ML
25 INJECTION INTRAVENOUS ONCE
Refills: 0 | Status: DISCONTINUED | OUTPATIENT
Start: 2020-06-01 | End: 2020-06-01

## 2020-06-01 RX ADMIN — Medication 300 MILLIGRAM(S): at 12:23

## 2020-06-01 RX ADMIN — Medication 81 MILLIGRAM(S): at 15:03

## 2020-06-01 RX ADMIN — Medication 0.4 MILLIGRAM(S): at 18:00

## 2020-06-01 RX ADMIN — QUETIAPINE FUMARATE 12.5 MILLIGRAM(S): 200 TABLET, FILM COATED ORAL at 22:46

## 2020-06-01 RX ADMIN — ATORVASTATIN CALCIUM 40 MILLIGRAM(S): 80 TABLET, FILM COATED ORAL at 21:57

## 2020-06-01 RX ADMIN — FENTANYL CITRATE 25 MICROGRAM(S): 50 INJECTION INTRAVENOUS at 17:35

## 2020-06-01 RX ADMIN — WARFARIN SODIUM 5 MILLIGRAM(S): 2.5 TABLET ORAL at 21:57

## 2020-06-01 RX ADMIN — CARVEDILOL PHOSPHATE 12.5 MILLIGRAM(S): 80 CAPSULE, EXTENDED RELEASE ORAL at 05:22

## 2020-06-01 RX ADMIN — Medication 30 MILLIGRAM(S): at 17:40

## 2020-06-01 RX ADMIN — CLOPIDOGREL BISULFATE 75 MILLIGRAM(S): 75 TABLET, FILM COATED ORAL at 12:23

## 2020-06-01 RX ADMIN — CARVEDILOL PHOSPHATE 12.5 MILLIGRAM(S): 80 CAPSULE, EXTENDED RELEASE ORAL at 18:07

## 2020-06-01 RX ADMIN — Medication 145 MILLIGRAM(S): at 12:23

## 2020-06-01 RX ADMIN — Medication 1 MILLIGRAM(S): at 12:23

## 2020-06-01 NOTE — PROGRESS NOTE ADULT - SUBJECTIVE AND OBJECTIVE BOX
Cardiology Nurse Practitioner Note    No further chest pain  Per Dr. Rasmussen's note:  -d/c ASA  -start on coumadin tonight  -imdur 30mg    discharge to home on plavix and coumadin Cardiology Nurse Practitioner Note    No further chest pain  Per Dr. Rasmussen's note:  -d/c ASA (high risk for bleeding with triple therapy)  -start on coumadin tonight  -imdur 30mg    discharge to home on plavix and coumadin

## 2020-06-01 NOTE — DISCHARGE NOTE PROVIDER - PROVIDER TOKENS
PROVIDER:[TOKEN:[[id number]]] PROVIDER:[TOKEN:[[id number]]],PROVIDER:[TOKEN:[[id number]]]

## 2020-06-01 NOTE — DISCHARGE NOTE PROVIDER - NSDCCPCAREPLAN_GEN_ALL_CORE_FT
PRINCIPAL DISCHARGE DIAGNOSIS  Diagnosis: CAD S/P percutaneous coronary angioplasty  Assessment and Plan of Treatment: continue plavix  follow up with cardiology      SECONDARY DISCHARGE DIAGNOSES  Diagnosis: CKD (chronic kidney disease), stage III  Assessment and Plan of Treatment: stable  follow up with nephrology in 1 week  RESTART ENTRESTO in 2-3 days    Diagnosis: Hypotension  Assessment and Plan of Treatment: unclear cause, resolved    Diagnosis: Atrial fibrillation  Assessment and Plan of Treatment: restart coumadin, please check INR (coumadin level) in 2-3 days with primary care doctor.    Diagnosis: DM (diabetes mellitus)  Assessment and Plan of Treatment: diet controlled per pt

## 2020-06-01 NOTE — PROGRESS NOTE ADULT - SUBJECTIVE AND OBJECTIVE BOX
Nurse Practitioner Progress note:     INTERVAL HISTORY: 88 year old male with  MI (1973) then s/p CABG (1996), PCI (ELOY x 2 to D1 05/2019), ischemic CM (EF 35% 06/19) s/p [employer] [facility] ICD, Afib (on coumadin), CVA, HTN, HLD, and DMII with c/o SOB and mild chest pressure.  + stress test.  For [facility] to assess coronary arteries.     MEDICATIONS:  carvedilol 12.5 milliGRAM(s) Oral every 12 hours  QUEtiapine 12.5 milliGRAM(s) Oral at bedtime  allopurinol 300 milliGRAM(s) Oral daily  atorvastatin 40 milliGRAM(s) Oral at bedtime  fenofibrate Tablet 145 milliGRAM(s) Oral daily  aspirin  chewable 81 milliGRAM(s) Oral daily  clopidogrel Tablet 75 milliGRAM(s) Oral daily  enoxaparin Injectable 40 milliGRAM(s) SubCutaneous daily  folic acid 1 milliGRAM(s) Oral daily  sodium chloride 0.9%. 1000 milliLiter(s) IV Continuous <Continuous>      TELEMETRY:     T(C): 36.8 (06-01-20 @ 14:35), Max: 36.8 (06-01-20 @ 07:23)  HR: 67 (06-01-20 @ 14:35) (67 - 79)  BP: 145/72 (06-01-20 @ 14:35) (114/61 - 154/65)  RR: 16 (06-01-20 @ 14:35) (16 - 18)  SpO2: 98% (06-01-20 @ 14:35) (95% - 98%)  Wt(kg): --    PHYSICAL EXAM:  Appearance: Normal	  Cardiovascular: Normal S1 S2, No JVD, No murmurs, No edema  Respiratory: Lungs clear to auscultation	  Psychiatry: A & O x 3, Mood & affect appropriate  Gastrointestinal:  Soft, Non-tender, + BS	  Neurologic: Non-focal, A&O X3.  No neuro deficits  Procedure Site: Right groin with angioseal closure benign.  No bleeding/hematoma/ecchymosis. + palp pedal pulse.     12 lead EKG:  	    PROCEDURE RESULTS: S/P LHC with PCI with ELOY X1 to SVG-->OM, patent LIMA-->LAD via right groin.  Plavix load given in cath lab.     ASSESSMENT/PLAN: 	  -Groin precaution  -Bedrest X 4h  -cont IV hydration for 6 hours post procedure  -Resume home meds  -Initiate ASA  -Follow up with Dr. Sinclair  -Check labs/EKG/site check in AM  -Please call with questions/concerns Nurse Practitioner Progress note:     INTERVAL HISTORY: 88 year old male with  MI (1973) then s/p CABG (1996), PCI (ELOY x 2 to D1 05/2019), ischemic CM (EF 35% 06/19) s/p [employer] ICD, Afib (on coumadin), CVA, HTN, HLD, and DMII with c/o SOB and mild chest pressure.  + stress test.  For [facility] to assess coronary arteries.     MEDICATIONS:  carvedilol 12.5 milliGRAM(s) Oral every 12 hours  QUEtiapine 12.5 milliGRAM(s) Oral at bedtime  allopurinol 300 milliGRAM(s) Oral daily  atorvastatin 40 milliGRAM(s) Oral at bedtime  fenofibrate Tablet 145 milliGRAM(s) Oral daily  aspirin  chewable 81 milliGRAM(s) Oral daily  clopidogrel Tablet 75 milliGRAM(s) Oral daily  enoxaparin Injectable 40 milliGRAM(s) SubCutaneous daily  folic acid 1 milliGRAM(s) Oral daily  sodium chloride 0.9%. 1000 milliLiter(s) IV Continuous <Continuous>      TELEMETRY: NSR    T(C): 36.8 (06-01-20 @ 14:35), Max: 36.8 (06-01-20 @ 07:23)  HR: 67 (06-01-20 @ 14:35) (67 - 79)  BP: 145/72 (06-01-20 @ 14:35) (114/61 - 154/65)  RR: 16 (06-01-20 @ 14:35) (16 - 18)  SpO2: 98% (06-01-20 @ 14:35) (95% - 98%)  Wt(kg): --    PHYSICAL EXAM:  Appearance: Normal	  Cardiovascular: Normal S1 S2, No JVD, No murmurs, No edema  Respiratory: Lungs clear to auscultation	  Psychiatry: A & O x 3, Mood & affect appropriate  Gastrointestinal:  Soft, Non-tender, + BS	  Neurologic: Non-focal, A&O X3.  No neuro deficits  Procedure Site: Right groin with angioseal closure benign.  No bleeding/hematoma/ecchymosis. + palp pedal pulse.     12 lead EKG:  	NSR no acute changes    PROCEDURE RESULTS: S/P LHC with PCI with ELOY X1 to SVG-->OM, patent LIMA-->LAD via right groin. (full report to follow).  Plavix load given in cath lab.     ASSESSMENT/PLAN: 	  -Groin precautions  -Bedrest X 4h  -cont IV hydration for 6 hours post procedure  -Resume home meds  -Initiate ASA  -Follow up with Dr. Sinclair  -Check labs/EKG/site check in AM  -Please call with questions/concerns Nurse Practitioner Progress note:     INTERVAL HISTORY: 88 year old male with  MI (1973) then s/p CABG (1996), PCI (ELOY x 2 to D1 05/2019), ischemic CM (EF 35% 06/19) s/p [employer] ICD, Afib (on coumadin), CVA, HTN, HLD, and DMII with c/o SOB and mild chest pressure.  + stress test.  For [facility] to assess coronary arteries.     MEDICATIONS:  carvedilol 12.5 milliGRAM(s) Oral every 12 hours  QUEtiapine 12.5 milliGRAM(s) Oral at bedtime  allopurinol 300 milliGRAM(s) Oral daily  atorvastatin 40 milliGRAM(s) Oral at bedtime  fenofibrate Tablet 145 milliGRAM(s) Oral daily  aspirin  chewable 81 milliGRAM(s) Oral daily  clopidogrel Tablet 75 milliGRAM(s) Oral daily  enoxaparin Injectable 40 milliGRAM(s) SubCutaneous daily  folic acid 1 milliGRAM(s) Oral daily  sodium chloride 0.9%. 1000 milliLiter(s) IV Continuous <Continuous>      TELEMETRY: NSR    T(C): 36.8 (06-01-20 @ 14:35), Max: 36.8 (06-01-20 @ 07:23)  HR: 67 (06-01-20 @ 14:35) (67 - 79)  BP: 145/72 (06-01-20 @ 14:35) (114/61 - 154/65)  RR: 16 (06-01-20 @ 14:35) (16 - 18)  SpO2: 98% (06-01-20 @ 14:35) (95% - 98%)  Wt(kg): --    PHYSICAL EXAM:  Appearance: Normal	  Cardiovascular: Normal S1 S2, No JVD, No murmurs, No edema  Respiratory: Lungs clear to auscultation	  Psychiatry: A & O x 3, Mood & affect appropriate  Gastrointestinal:  Soft, Non-tender, + BS	  Neurologic: Non-focal, A&O X3.  No neuro deficits  Procedure Site: Right groin with angioseal closure benign.  No bleeding/hematoma/ecchymosis. + palp pedal pulse.     12 lead EKG:  	NSR no acute changes    PROCEDURE RESULTS: S/P LHC with PCI with ELOY X1 to SVG-->OM, patent LIMA-->LAD via right groin. (full report to follow).  Plavix load given in cath lab.     ASSESSMENT/PLAN: 	  -Groin precautions  -Bedrest X 4h  -cont IV hydration for 6 hours post procedure  -Resume home meds  -Initiate ASA  -Follow up with Dr. Sinclair  -Check labs/EKG/site check in AM  -Please call with questions/concerns  -cardiac rehab info provided/referral and communication to cardiac rehab completed

## 2020-06-01 NOTE — PROGRESS NOTE ADULT - SUBJECTIVE AND OBJECTIVE BOX
Cardiology Nurse Practitioner Note  Pre procedure    88 year old male with  MI (1973) then s/p CABG (1996), PCI (ELOY x 2 to D1 05/2019), ischemic CM (EF 35% 06/19) s/p [employer] [facility] ICD, Afib (on coumadin), CVA, HTN, HLD, and DMII with c/o SOB and mild chest pressure.  Pt underwent a NST which was positive.  For [facility] to assess coronary arteries. Renal on board for CRF.  IV hydration given as per Renal rec's.    VSS  Neuro: A&O X3  LungsL CTA  CV: RRR  EXT: + palp pulses    ASA 3   Mallampati 2  BR 1.2%    ASA given  Plavix was given in AM    Pt cleared for procedure

## 2020-06-01 NOTE — PROGRESS NOTE ADULT - SUBJECTIVE AND OBJECTIVE BOX
seen for hypotension, +stress test    no acute complaints/events  ros otherwise negative  awaiting cath    MEDICATIONS  (STANDING):  allopurinol 300 milliGRAM(s) Oral daily  atorvastatin 40 milliGRAM(s) Oral at bedtime  carvedilol 12.5 milliGRAM(s) Oral every 12 hours  clopidogrel Tablet 75 milliGRAM(s) Oral daily  enoxaparin Injectable 40 milliGRAM(s) SubCutaneous daily  fenofibrate Tablet 145 milliGRAM(s) Oral daily  folic acid 1 milliGRAM(s) Oral daily  QUEtiapine 12.5 milliGRAM(s) Oral at bedtime  sodium chloride 0.9%. 1000 milliLiter(s) (100 mL/Hr) IV Continuous <Continuous>    MEDICATIONS  (PRN):  glucagon  Injectable 1 milliGRAM(s) IntraMuscular once PRN Glucose LESS THAN 70 milligrams/deciliter      Allergies    No Known Allergies      Vital Signs Last 24 Hrs  T(C): 36.8 (2020 07:23), Max: 36.8 (2020 07:23)  T(F): 98.3 (2020 07:23), Max: 98.3 (2020 07:23)  HR: 79 (2020 07:23) (66 - 79)  BP: 136/- (2020 07:23) (113/61 - 154/65)  BP(mean): 70 (2020 07:23) (70 - 70)  RR: 18 (2020 07:23) (16 - 18)  SpO2: 95% (31 May 2020 19:43) (95% - 98%)    PHYSICAL EXAM:    GENERAL: NAD  CHEST/LUNG: Clear to percussion bilaterally  HEART: Regular rate and rhythm; S1 S2  ABDOMEN: Soft, Nontender, Bowel sounds present  EXTREMITIES:  no edema   NERVOUS SYSTEM:  Alert & Oriented X3, Motor Strength 5/5 B/L upper and lower extremities  PSYCH: normal mood, appropriate response.    LABS:                        13.0   3.88  )-----------( 134      ( 31 May 2020 07:09 )             40.0     06-01    140  |  105  |  31.0<H>  ----------------------------<  110<H>  4.5   |  24.0  |  1.48<H>    Ca    9.2      2020 07:21      PT/INR - ( 2020 07:21 )   PT: 19.4 sec;   INR: 1.69 ratio         PTT - ( 2020 07:21 )  PTT:40.4 sec  Urinalysis Basic - ( 30 May 2020 10:31 )    Color: Yellow / Appearance: Clear / S.010 / pH: x  Gluc: x / Ketone: Negative  / Bili: Negative / Urobili: Negative mg/dL   Blood: x / Protein: Negative mg/dL / Nitrite: Negative   Leuk Esterase: Moderate / RBC: Negative /HPF / WBC 3-5   Sq Epi: x / Non Sq Epi: Occasional / Bacteria: Negative        CAPILLARY BLOOD GLUCOSE            RADIOLOGY & ADDITIONAL TESTS:

## 2020-06-01 NOTE — PROGRESS NOTE ADULT - SUBJECTIVE AND OBJECTIVE BOX
Cardiology Nurse Practitioner Note    Pt came out of cath lab with c/o pain radiating across his shoulder blades and mild chest discomfort.  No nausea. Fentanyl IV given with no relief.  EKG unchanged. Dr. Rasmussen at bedside.  Toradol and SL NTG given with relief of pain.  Will cont to monitor. VSS

## 2020-06-01 NOTE — DISCHARGE NOTE PROVIDER - CARE PROVIDERS DIRECT ADDRESSES
[email or web address]

## 2020-06-01 NOTE — DISCHARGE NOTE PROVIDER - NSDCMRMEDTOKEN_GEN_ALL_CORE_FT
allopurinol 300 mg oral tablet: 1 tab(s) orally once a day  atorvastatin 40 mg oral tablet: 1 tab(s) orally once a day  clopidogrel 75 mg oral tablet: 1 tab(s) orally once a day MDD:75  Coreg 12.5 mg oral tablet: 1 tab(s) orally 2 times a day  Entresto 24 mg-26 mg oral tablet: 1 tab(s) orally 2 times a day  fenofibrate 160 mg oral tablet: 1 tab(s) orally once a day  folic acid 1 mg oral tablet: 1 tab(s) orally once a day  isosorbide mononitrate 10 mg oral tablet: 1 tab(s) orally once a day  QUEtiapine 25 mg oral tablet: 0.5 tab(s) orally once a day (at bedtime)  tiZANidine 4 mg oral capsule: 1 cap(s) orally 3 times a day, As Needed  traMADol 50 mg oral tablet: 1 tab(s) orally 3 times a day, As Needed  warfarin 5 mg oral tablet: 1 tab(s) orally once a day allopurinol 300 mg oral tablet: 1 tab(s) orally once a day  atorvastatin 40 mg oral tablet: 1 tab(s) orally once a day  clopidogrel 75 mg oral tablet: 1 tab(s) orally once a day  Coreg 12.5 mg oral tablet: 1 tab(s) orally 2 times a day  Entresto 24 mg-26 mg oral tablet: 1 tab(s) orally 2 times a day  restart on 6/5/20 in am  fenofibrate 160 mg oral tablet: 1 tab(s) orally once a day  folic acid 1 mg oral tablet: 1 tab(s) orally once a day  isosorbide mononitrate 30 mg oral tablet, extended release: 1 tab(s) orally once a day  QUEtiapine 25 mg oral tablet: 0.5 tab(s) orally once a day (at bedtime)  tiZANidine 4 mg oral capsule: 1 cap(s) orally 3 times a day, As Needed  traMADol 50 mg oral tablet: 1 tab(s) orally 3 times a day, As Needed  warfarin 5 mg oral tablet: 1 tab(s) orally once a day

## 2020-06-01 NOTE — DISCHARGE NOTE PROVIDER - HOSPITAL COURSE
89 yo M w/ hx MI (1973) then s/p CABG (1996), PCI (ELOY x 2 to D1 05/2019), ischemic CM (EF 35% 06/19) s/p [facility] [facility] ICD, Afib (on coumadin), CVA, HTN, HLD, and DMII who presented to the ED after being hypotensive at his Cardiologist Dr. Sinclair's office. He was NPO for stress test that am and felt lightheaded.  had prior episodes of LH in past. Patient states that for the last few months he has been experiencing dyspnea with very minimal exertion and with daily activities.        unclear etiology of hypotension. underwent stress test by cardiology which was positive and underwent LHC with SVG to OM s/p 1 ELOY.  aspirin discontinued. continue with plavix and coumadin. CKD3 at baseline, restart entresto in 2 days after d/w nephrology.     no acute complaints. no tele events, ros negative. vitals stable        aa0x3 nad rrr s1s2 ctab soft abdomen no LE edema nonfocal ambulating w/o issues.        dc planning 34 minutes.

## 2020-06-01 NOTE — PROGRESS NOTE ADULT - SUBJECTIVE AND OBJECTIVE BOX
S/p cardiac cath.   Full report to follow.     PCI performed to SVG to OM with 1 ELOY.   If stable, consider dc planning for tomorrow. S/p cardiac cath.   Full report to follow.     PCI performed to SVG to OM with 1 ELOY.   Change imdur to 30 mg.   DC aspirin. Plan for plavix and coumadin. Start coumadin tonight.   If stable, consider dc planning for tomorrow.

## 2020-06-01 NOTE — PROGRESS NOTE ADULT - ASSESSMENT
CKD(III): +DM +HTN (@ baseline renal function)  Cardiac cath today at increased risk for RCN  Renal sonogram w/o obstruction  - hold Entresto  - avoid potential nephrotoxins  - will begin IV isotonic fluid for renal prophylaxis  - pt (and son via telephone) aware of renal risks inherent to the procedure which could possibly result in (permanent) worsening of his renal function and possible need for dialysis (which may also be permanent)  - follow labs

## 2020-06-01 NOTE — DISCHARGE NOTE PROVIDER - NSDCFUSCHEDAPPT_GEN_ALL_CORE_FT
KAITLIN CORBETT ; 08/11/2020 ; [facility] [address] [place] [address] KAITLIN CORBETT ; 08/11/2020 ; [facility] [address] [place] [address]

## 2020-06-01 NOTE — PROGRESS NOTE ADULT - SUBJECTIVE AND OBJECTIVE BOX
NEPHROLOGY INTERVAL HPI/OVERNIGHT EVENTS:  pt comfortable  no adverse events overnight    MEDICATIONS  (STANDING):  allopurinol 300 milliGRAM(s) Oral daily  atorvastatin 40 milliGRAM(s) Oral at bedtime  carvedilol 12.5 milliGRAM(s) Oral every 12 hours  clopidogrel Tablet 75 milliGRAM(s) Oral daily  enoxaparin Injectable 40 milliGRAM(s) SubCutaneous daily  fenofibrate Tablet 145 milliGRAM(s) Oral daily  folic acid 1 milliGRAM(s) Oral daily  QUEtiapine 12.5 milliGRAM(s) Oral at bedtime  sodium chloride 0.9%. 1000 milliLiter(s) (100 mL/Hr) IV Continuous <Continuous>    MEDICATIONS  (PRN):  glucagon  Injectable 1 milliGRAM(s) IntraMuscular once PRN Glucose LESS THAN 70 milligrams/deciliter      Allergies    No Known Allergies        Vital Signs Last 24 Hrs  T(C): 36.8 (01 Jun 2020 07:23), Max: 36.8 (01 Jun 2020 07:23)  T(F): 98.3 (01 Jun 2020 07:23), Max: 98.3 (01 Jun 2020 07:23)  HR: 79 (01 Jun 2020 07:23) (67 - 79)  BP: 136/- (01 Jun 2020 07:23) (114/61 - 154/65)  BP(mean): 70 (01 Jun 2020 07:23) (70 - 70)  RR: 18 (01 Jun 2020 07:23) (16 - 18)  SpO2: 95% (31 May 2020 19:43) (95% - 96%)    PHYSICAL EXAM:  GENERAL: Asymptomatic  ENMT: Moist mucous membranes  NECK: Supple, No JVD, Normal thyroid  NERVOUS SYSTEM:  Alert & Oriented X3, intact and symmetric  CHEST/LUNG: Clear bilaterally  HEART: Regular rate and rhythm; No rub  ABDOMEN: Soft, Nontender, Nondistended; BS+  EXTREMITIES:  2+ Peripheral Pulses, No LE edema  SKIN: No rashes or lesions    LABS:                        13.0   3.88  )-----------( 134      ( 31 May 2020 07:09 )             40.0     06-01    140  |  105  |  31.0<H>  ----------------------------<  110<H>  4.5   |  24.0  |  1.48<H>    Creatinine, Serum: 1.40 mg/dL (05.31.20 @ 07:09)        Ca    9.2      01 Jun 2020 07:21      PT/INR - ( 01 Jun 2020 07:21 )   PT: 19.4 sec;   INR: 1.69 ratio         PTT - ( 01 Jun 2020 07:21 )  PTT:40.4 sec        RADIOLOGY & ADDITIONAL TESTS:  < from: US Renal (05.31.20 @ 13:33) >   EXAM:  US KIDNEY(S)                          PROCEDURE DATE:  05/31/2020          INTERPRETATION:  CLINICAL INFORMATION: Renal failure.    COMPARISON: None available.    TECHNIQUE: Sonography of the kidneys. The examination was technically limitedsecondary to overlying bowel gas.    FINDINGS:  Right kidney:  10.1 cm. No renal mass, hydronephrosis or calculi.  Left kidney:  10.3 cm. No renal mass, hydronephrosis or calculi.    Incidental note is made of a 2.9 cm splenic cyst with a thin internal septation.    IMPRESSION:   No hydronephrosis.    < end of copied text >

## 2020-06-01 NOTE — PROGRESS NOTE ADULT - ASSESSMENT
89 yo M w/ hx MI (1973) then s/p CABG (1996), PCI (ELOY x 2 to D1 05/2019), ischemic CM (EF 35% 06/19) s/p [employer] [facility] ICD, Afib (on coumadin), CVA, HTN, HLD, and DMII who presented to the ED after being hypotensive at his Cardiologist Dr. Sinclair's office. He was NPO for stress test that am and felt lightheaded.  had prior episodes of LH in past. Patient states that for the last few months he has been experiencing dyspnea with very minimal exertion and with daily activities.      hypotension--unclear etiology, dehydration?   + stress test, planned for cath Monday---hold coumadin.    CAD: c/w plavix, coreg and lipitor.    Afib: hold coumadin    chronic HFrEF: seems euvolemic    holding entresto for cath    had outpatient echo prior to ER arrival    LEANDER on CKD3: monitor post IVF    renal consulted/following    renal u/w with cyst.        dm2: dc sliding scale, hga1c 5.8

## 2020-06-01 NOTE — DISCHARGE NOTE PROVIDER - CARE PROVIDER_API CALL
Yneifer Sinclair  INTERNAL MEDICINE  [address] [place] [address]  Phone: [phone]  Fax: [phone]  Follow Up Time: Yenifer Sinclair  INTERNAL MEDICINE  [address] [place] [address]  Phone: [phone]  Fax: [phone]  Follow Up Time:     Stanislav Mendez  NEPHROLOGY  [address] [place] [address]  Phone: [phone]  Fax: [phone]  Follow Up Time:

## 2020-06-02 ENCOUNTER — TRANSCRIPTION ENCOUNTER (OUTPATIENT)
Age: 85
End: 2020-06-02

## 2020-06-02 VITALS
SYSTOLIC BLOOD PRESSURE: 145 MMHG | HEART RATE: 69 BPM | TEMPERATURE: 97 F | OXYGEN SATURATION: 89 % | RESPIRATION RATE: 18 BRPM | DIASTOLIC BLOOD PRESSURE: 63 MMHG

## 2020-06-02 LAB
ANION GAP SERPL CALC-SCNC: 13 MMOL/L — SIGNIFICANT CHANGE UP (ref 5–17)
APTT BLD: 37.4 SEC — HIGH (ref 27.5–36.3)
BUN SERPL-MCNC: 33 MG/DL — HIGH (ref 8–20)
CALCIUM SERPL-MCNC: 9.2 MG/DL — SIGNIFICANT CHANGE UP (ref 8.6–10.2)
CHLORIDE SERPL-SCNC: 104 MMOL/L — SIGNIFICANT CHANGE UP (ref 98–107)
CO2 SERPL-SCNC: 22 MMOL/L — SIGNIFICANT CHANGE UP (ref 22–29)
CREAT SERPL-MCNC: 1.55 MG/DL — HIGH (ref 0.5–1.3)
GLUCOSE SERPL-MCNC: 92 MG/DL — SIGNIFICANT CHANGE UP (ref 70–99)
HCT VFR BLD CALC: 40.1 % — SIGNIFICANT CHANGE UP (ref 39–50)
HGB BLD-MCNC: 13 G/DL — SIGNIFICANT CHANGE UP (ref 13–17)
INR BLD: 1.42 RATIO — HIGH (ref 0.88–1.16)
MAGNESIUM SERPL-MCNC: 1.6 MG/DL — LOW (ref 1.8–2.6)
MCHC RBC-ENTMCNC: 29.4 PG — SIGNIFICANT CHANGE UP (ref 27–34)
MCHC RBC-ENTMCNC: 32.4 GM/DL — SIGNIFICANT CHANGE UP (ref 32–36)
MCV RBC AUTO: 90.7 FL — SIGNIFICANT CHANGE UP (ref 80–100)
PLATELET # BLD AUTO: 152 K/UL — SIGNIFICANT CHANGE UP (ref 150–400)
POTASSIUM SERPL-MCNC: 4.5 MMOL/L — SIGNIFICANT CHANGE UP (ref 3.5–5.3)
POTASSIUM SERPL-SCNC: 4.5 MMOL/L — SIGNIFICANT CHANGE UP (ref 3.5–5.3)
PROTHROM AB SERPL-ACNC: 16.2 SEC — HIGH (ref 10–12.9)
RBC # BLD: 4.42 M/UL — SIGNIFICANT CHANGE UP (ref 4.2–5.8)
RBC # FLD: 13.6 % — SIGNIFICANT CHANGE UP (ref 10.3–14.5)
SODIUM SERPL-SCNC: 139 MMOL/L — SIGNIFICANT CHANGE UP (ref 135–145)
WBC # BLD: 3.8 K/UL — SIGNIFICANT CHANGE UP (ref 3.8–10.5)
WBC # FLD AUTO: 3.8 K/UL — SIGNIFICANT CHANGE UP (ref 3.8–10.5)

## 2020-06-02 PROCEDURE — 93005 ELECTROCARDIOGRAM TRACING: CPT

## 2020-06-02 PROCEDURE — C9604: CPT | Mod: LC

## 2020-06-02 PROCEDURE — C1725: CPT

## 2020-06-02 PROCEDURE — 80053 COMPREHEN METABOLIC PANEL: CPT

## 2020-06-02 PROCEDURE — 36415 COLL VENOUS BLD VENIPUNCTURE: CPT

## 2020-06-02 PROCEDURE — 85610 PROTHROMBIN TIME: CPT

## 2020-06-02 PROCEDURE — C1874: CPT

## 2020-06-02 PROCEDURE — 93459 L HRT ART/GRFT ANGIO: CPT | Mod: XU

## 2020-06-02 PROCEDURE — 84484 ASSAY OF TROPONIN QUANT: CPT

## 2020-06-02 PROCEDURE — 99285 EMERGENCY DEPT VISIT HI MDM: CPT

## 2020-06-02 PROCEDURE — 99153 MOD SED SAME PHYS/QHP EA: CPT

## 2020-06-02 PROCEDURE — 99239 HOSP IP/OBS DSCHRG MGMT >30: CPT

## 2020-06-02 PROCEDURE — 84443 ASSAY THYROID STIM HORMONE: CPT

## 2020-06-02 PROCEDURE — 93017 CV STRESS TEST TRACING ONLY: CPT

## 2020-06-02 PROCEDURE — 83690 ASSAY OF LIPASE: CPT

## 2020-06-02 PROCEDURE — C1769: CPT

## 2020-06-02 PROCEDURE — 85027 COMPLETE CBC AUTOMATED: CPT

## 2020-06-02 PROCEDURE — C1887: CPT

## 2020-06-02 PROCEDURE — C1894: CPT

## 2020-06-02 PROCEDURE — 76775 US EXAM ABDO BACK WALL LIM: CPT

## 2020-06-02 PROCEDURE — 81001 URINALYSIS AUTO W/SCOPE: CPT

## 2020-06-02 PROCEDURE — 93010 ELECTROCARDIOGRAM REPORT: CPT

## 2020-06-02 PROCEDURE — 83735 ASSAY OF MAGNESIUM: CPT

## 2020-06-02 PROCEDURE — 80061 LIPID PANEL: CPT

## 2020-06-02 PROCEDURE — 99233 SBSQ HOSP IP/OBS HIGH 50: CPT

## 2020-06-02 PROCEDURE — 92978 ENDOLUMINL IVUS OCT C 1ST: CPT | Mod: LC

## 2020-06-02 PROCEDURE — C1760: CPT

## 2020-06-02 PROCEDURE — C1753: CPT

## 2020-06-02 PROCEDURE — 85730 THROMBOPLASTIN TIME PARTIAL: CPT

## 2020-06-02 PROCEDURE — 82962 GLUCOSE BLOOD TEST: CPT

## 2020-06-02 PROCEDURE — 80048 BASIC METABOLIC PNL TOTAL CA: CPT

## 2020-06-02 PROCEDURE — 78452 HT MUSCLE IMAGE SPECT MULT: CPT

## 2020-06-02 PROCEDURE — 83880 ASSAY OF NATRIURETIC PEPTIDE: CPT

## 2020-06-02 PROCEDURE — U0003: CPT

## 2020-06-02 PROCEDURE — 87086 URINE CULTURE/COLONY COUNT: CPT

## 2020-06-02 PROCEDURE — 71045 X-RAY EXAM CHEST 1 VIEW: CPT

## 2020-06-02 PROCEDURE — 99152 MOD SED SAME PHYS/QHP 5/>YRS: CPT

## 2020-06-02 PROCEDURE — A9500: CPT

## 2020-06-02 PROCEDURE — 83036 HEMOGLOBIN GLYCOSYLATED A1C: CPT

## 2020-06-02 RX ORDER — SACUBITRIL AND VALSARTAN 24; 26 MG/1; MG/1
1 TABLET, FILM COATED ORAL
Qty: 0 | Refills: 0 | DISCHARGE

## 2020-06-02 RX ORDER — CLOPIDOGREL BISULFATE 75 MG/1
1 TABLET, FILM COATED ORAL
Qty: 0 | Refills: 0 | DISCHARGE
Start: 2020-06-02

## 2020-06-02 RX ORDER — ISOSORBIDE MONONITRATE 60 MG/1
1 TABLET, EXTENDED RELEASE ORAL
Qty: 30 | Refills: 0
Start: 2020-06-02 | End: 2020-07-01

## 2020-06-02 RX ORDER — ISOSORBIDE MONONITRATE 60 MG/1
1 TABLET, EXTENDED RELEASE ORAL
Qty: 0 | Refills: 0 | DISCHARGE

## 2020-06-02 RX ADMIN — Medication 1 MILLIGRAM(S): at 10:38

## 2020-06-02 RX ADMIN — CLOPIDOGREL BISULFATE 75 MILLIGRAM(S): 75 TABLET, FILM COATED ORAL at 10:38

## 2020-06-02 RX ADMIN — Medication 300 MILLIGRAM(S): at 10:38

## 2020-06-02 RX ADMIN — ISOSORBIDE MONONITRATE 30 MILLIGRAM(S): 60 TABLET, EXTENDED RELEASE ORAL at 10:37

## 2020-06-02 RX ADMIN — CARVEDILOL PHOSPHATE 12.5 MILLIGRAM(S): 80 CAPSULE, EXTENDED RELEASE ORAL at 04:48

## 2020-06-02 RX ADMIN — Medication 145 MILLIGRAM(S): at 10:38

## 2020-06-02 NOTE — PROGRESS NOTE ADULT - REASON FOR ADMISSION
Dyspnea on exertion
Unknown

## 2020-06-02 NOTE — PROGRESS NOTE ADULT - SUBJECTIVE AND OBJECTIVE BOX
NEPHROLOGY INTERVAL HPI/OVERNIGHT EVENTS:  pt comfortable  tolerated cardiac cath and stent  no cp, sob, n/v/d  voiding clear urine    MEDICATIONS  (STANDING):  allopurinol 300 milliGRAM(s) Oral daily  atorvastatin 40 milliGRAM(s) Oral at bedtime  carvedilol 12.5 milliGRAM(s) Oral every 12 hours  clopidogrel Tablet 75 milliGRAM(s) Oral daily  enoxaparin Injectable 40 milliGRAM(s) SubCutaneous daily  fenofibrate Tablet 145 milliGRAM(s) Oral daily  folic acid 1 milliGRAM(s) Oral daily  isosorbide   mononitrate ER Tablet (IMDUR) 30 milliGRAM(s) Oral daily  QUEtiapine 12.5 milliGRAM(s) Oral at bedtime  sodium chloride 0.9%. 1000 milliLiter(s) (100 mL/Hr) IV Continuous <Continuous>    MEDICATIONS  (PRN):      Allergies    No Known Allergies          Vital Signs Last 24 Hrs  T(C): 36.6 (02 Jun 2020 04:38), Max: 36.8 (01 Jun 2020 14:35)  T(F): 97.8 (02 Jun 2020 04:38), Max: 98.2 (01 Jun 2020 14:35)  HR: 85 (02 Jun 2020 04:38) (62 - 85)  BP: 159/75 (02 Jun 2020 04:38) (108/67 - 182/77)  BP(mean): --  RR: 16 (02 Jun 2020 04:38) (14 - 16)  SpO2: 95% (02 Jun 2020 04:38) (94% - 98%)    PHYSICAL EXAM:  Comfortable  NERVOUS SYSTEM:  Alert & Oriented X3, intact and symmetric  CHEST/LUNG: Clear bilaterally  HEART: Regular rate and rhythm; No rub  ABDOMEN: Soft, Nontender, Nondistended; BS+  EXTREMITIES:  2+ Peripheral Pulses, No LE edema  SKIN: No rashes or lesions    LABS:                        13.0   3.80  )-----------( 152      ( 02 Jun 2020 06:34 )             40.1     06-02    139  |  104  |  33.0<H>  ----------------------------<  92  4.5   |  22.0  |  1.55<H>    Ca    9.2      02 Jun 2020 06:32  Mg     1.6     06-02      PT/INR - ( 02 Jun 2020 06:33 )   PT: 16.2 sec;   INR: 1.42 ratio         PTT - ( 02 Jun 2020 06:33 )  PTT:37.4 sec    Magnesium, Serum: 1.6 mg/dL (06-02 @ 06:32)      RADIOLOGY & ADDITIONAL TESTS:  < from: US Renal (05.31.20 @ 13:33) >   EXAM:  US KIDNEY(S)                          PROCEDURE DATE:  05/31/2020          INTERPRETATION:  CLINICAL INFORMATION: Renal failure.    COMPARISON: None available.    TECHNIQUE: Sonography of the kidneys. The examination was technically limitedsecondary to overlying bowel gas.    FINDINGS:  Right kidney:  10.1 cm. No renal mass, hydronephrosis or calculi.  Left kidney:  10.3 cm. No renal mass, hydronephrosis or calculi.    Incidental note is made of a 2.9 cm splenic cyst with a thin internal septation.    IMPRESSION:   No hydronephrosis.    < end of copied text >

## 2020-06-02 NOTE — PROGRESS NOTE ADULT - ASSESSMENT
CKD(III): +DM +HTN  s/p cardiac cath==> renal function at baseline; doubt RCN  Renal sonogram w/o obstruction  - monitor renal function and if remains stable restart Entresto

## 2020-06-02 NOTE — PROGRESS NOTE ADULT - SUBJECTIVE AND OBJECTIVE BOX
[place] [facility]                          [address] [place] [address]                       Phone: [phone]. Fax:[phone]      HPI: 88 year old male with PMHx of MI (1973) then s/p CABG (1996) and PCI (ELOY x 2 to D1 05/2019), ischemic CM HFrEF (EF 35% 06/19) s/p [employer] ICD, Afib (on coumadin), CVA, HTN, HLD, and DMII who presented to the ED after being hypotensive at his Cardiologist Dr. Sinclair's office. Patient states that for the last few months he has been experiencing dyspnea with very minimal exertion and with daily activities like making his bed. Patient states that in the last month he also began to get some vague chest tightness and shoulder pain at the same time he felt short of breath, and it would also resolve with rest. Patient states initially this was intermittent but now becoming more constant. states when has chest tightness sometimes has burning sensation in chest as well. Patient states he was complaining of lightheadedness and dizziness thsi AM and was found to have a low bp. Patient was then given IV fluids in the doctors office and EMS, and when he arrived in the ED his BP was 119/65. Patient states that his symptoms resolved with the IV fluids. Patient denies any fevers, chills, syncope, PND, LE edema, abdominal pain, N/V/D, or headache.     Hospitalization course:   In ED 05/29:  /92    RR20  T 98  97% sat RA, EKG   T wave inversion c/w lateral ischemia, CXR clear, trop 0.09, asa and IV heparin  cardiology consulted    Cath summary: 06.01.20 @ 16:03  VENTRICLES: No LV gram was performed; however, a recent echocardiogram  demonstratedan EF of 30 %.  CORONARY VESSELS: The coronary circulation is left dominant.  LM:   --  Distal left main: There was a 30 % stenosis.  LAD:   --  Proximal LAD: There was a 100 % stenosis.  CX:   --  Proximal circumflex: There was a diffuse 80 % stenosis.  --  Mid circumflex: There was a diffuse 80 % stenosis.  --  Distal circumflex: There was a diffuse 80 % stenosis.  RCA:   --  RCA: This vessel was not injected.  GRAFTS:   --  Graft to the distal LAD: The graft was a LIMA. Graft  angiography showed no evidence of disease.  --  Graft to the 1st obtuse marginal: The graft was a saphenous vein graft  from the aorta. Graft angiography showed severe atherosclerosis.  COMPLICATIONS: There were no complications.  DIAGNOSTIC IMPRESSIONS: Patent LIMA to LAD.  Severe diffuse circumflex disease from proximal to distal. (long area-  60-70mm)  Severe atheroscleortic/fibrotic ostial/proximal SVG to OM system. PCI  performed with 1 ELOY.  < from: Cardiac Cath Lab - Adult (06.01.20 @ 16:03) >  INTERVENTIONAL IMPRESSIONS: Patent LIMA to LAD.  Severe diffuse circumflex disease from proximal to distal. (long area-  60-70mm)  Severe atheroscleortic/fibrotic ostial/proximal SVG to OM system. PCI  performed with 1 ELOY.    EKG 06/02/2020: Normal Sinus rhythm HR 69   Dressing removed from R groin: site benign.  No bleeding/hematoma/ecchymosis.  + palp pedal pulse b/l     Medications:    allopurinol 300 milliGRAM(s) Oral daily  atorvastatin 40 milliGRAM(s) Oral at bedtime  carvedilol 12.5 milliGRAM(s) Oral every 12 hours  clopidogrel Tablet 75 milliGRAM(s) Oral daily  enoxaparin Injectable 40 milliGRAM(s) SubCutaneous daily  fenofibrate Tablet 145 milliGRAM(s) Oral daily  folic acid 1 milliGRAM(s) Oral daily  isosorbide   mononitrate ER Tablet (IMDUR) 30 milliGRAM(s) Oral daily  QUEtiapine 12.5 milliGRAM(s) Oral at bedtime  sodium chloride 0.9%. 1000 milliLiter(s) IV Continuous <Continuous>    T(C): 36.6 (06-02-20 @ 04:38), Max: 36.8 (06-01-20 @ 14:35)  HR: 85 (06-02-20 @ 04:38) (62 - 85)  BP: 159/75 (06-02-20 @ 04:38) (108/67 - 182/77)  RR: 16 (06-02-20 @ 04:38) (14 - 16)  SpO2: 95% (06-02-20 @ 04:38) (94% - 98%)    Review of system- Rest of the review of system are normal except mentioned in HPI    T(C): 36.6 (06-02-20 @ 04:38), Max: 36.8 (06-01-20 @ 14:35)  HR: 85 (06-02-20 @ 04:38) (62 - 85)  BP: 159/75 (06-02-20 @ 04:38) (108/67 - 182/77)  RR: 16 (06-02-20 @ 04:38) (14 - 16)  SpO2: 95% (06-02-20 @ 04:38) (94% - 98%)    LABS:                        13.0   3.80  )-----------( 152      ( 02 Jun 2020 06:34 )             40.1     06-02    139  |  104  |  33.0<H>  ----------------------------<  92  4.5   |  22.0  |  1.55<H>    Ca    9.2      02 Jun 2020 06:32  Mg     1.6     06-02      PT/INR - ( 02 Jun 2020 06:33 )   PT: 16.2 sec;   INR: 1.42 ratio    PTT - ( 02 Jun 2020 06:33 )  PTT:37.4 sec    PHYSICAL EXAM:  Vital Signs Last 24 Hrs  T(C): 36.6 (02 Jun 2020 04:38), Max: 36.8 (01 Jun 2020 14:35)  T(F): 97.8 (02 Jun 2020 04:38), Max: 98.2 (01 Jun 2020 14:35)  HR: 85 (02 Jun 2020 04:38) (62 - 85)  BP: 159/75 (02 Jun 2020 04:38) (108/67 - 182/77)  RR: 16 (02 Jun 2020 04:38) (14 - 16)  SpO2: 95% (02 Jun 2020 04:38) (94% - 98%)      GENERAL: NAD, well-groomed, well-developed  HEAD:  Atraumatic, Normocephalic  EYES: EOMI, PERRLA, conjunctiva and sclera clear  ENMT: Moist mucous membranes  NECK: Supple, No JVD  NERVOUS SYSTEM:  Alert & Oriented X3, Motor Strength 5/5 B/L upper and lower extremities; DTRs 2+ intact and symmetric  CHEST/LUNG: Clear to auscultation bilaterally; No rales, rhonchi, wheezing, or rubs  HEART: Regular rate and rhythm; No murmurs, rubs, or gallops  ABDOMEN: Soft, Nontender, Nondistended; Bowel sounds present x 4  GENITOURINARY- Voiding, no palpable bladder  EXTREMITIES:  2+ Peripheral Pulses. No Rashes, no edema, no clubbing, or cyanosis,   MUSCULOSKELTAL- No muscle tenderness, Muscle tone normal, No joint tenderness, no Joint swelling, Joint range of motion-normal  SKIN-no rash, no lesion  CNS- alert, oriented  X3 , non focal deficit   Psch: normal mood , normal affect    Assessment and Plan    88 year old male with PMHx of MI (1973) then s/p CABG (1996) and PCI (ELOY x 2 to D1 05/2019), ischemic CM HFrEF (EF 35% 06/19) s/p [employer] ICD, Afib (on coumadin), CVA, HTN, HLD, and DMII who presented to the ED after being hypotensive at his Cardiologist Dr. Sinclair's.  S/p cardiac cath. PCI performed to SVG to OM with 1 ELOY      Plan   Change imdur to 30 mg.   DC aspirin. Plan for plavix and coumadin. Start coumadin tonight.   If stable, consider dc planning for tomorrow. [place] [facility]                          [address] [place] [address]                       Phone: [phone]. Fax:[phone]      HPI: 88 year old male with PMHx of MI (1973) then s/p CABG (1996) and PCI (ELOY x 2 to D1 05/2019), ischemic CM HFrEF (EF 35% 06/19) s/p [employer] ICD, Afib (on coumadin), CVA, HTN, HLD, and DMII who presented to the ED after being hypotensive at his Cardiologist Dr. Sinclair's office. Patient states that for the last few months he has been experiencing dyspnea with very minimal exertion and with daily activities like making his bed. Patient states that in the last month he also began to get some vague chest tightness and shoulder pain at the same time he felt short of breath, and it would also resolve with rest. Patient states initially this was intermittent but now becoming more constant. states when has chest tightness sometimes has burning sensation in chest as well. Patient states he was complaining of lightheadedness and dizziness thsi AM and was found to have a low bp. Patient was then given IV fluids in the doctors office and EMS, and when he arrived in the ED his BP was 119/65. Patient states that his symptoms resolved with the IV fluids. Patient denies any fevers, chills, syncope, PND, LE edema, abdominal pain, N/V/D, or headache.     Hospitalization course:   In ED 05/29:  /92    RR20  T 98  97% sat RA, EKG   T wave inversion c/w lateral ischemia, CXR clear, trop 0.09, asa and IV heparin  cardiology consulted    Cath summary: 06.01.20 @ 16:03  VENTRICLES: No LV gram was performed; however, a recent echocardiogram  demonstratedan EF of 30 %.  CORONARY VESSELS: The coronary circulation is left dominant.  LM:   --  Distal left main: There was a 30 % stenosis.  LAD:   --  Proximal LAD: There was a 100 % stenosis.  CX:   --  Proximal circumflex: There was a diffuse 80 % stenosis.  --  Mid circumflex: There was a diffuse 80 % stenosis.  --  Distal circumflex: There was a diffuse 80 % stenosis.  RCA:   --  RCA: This vessel was not injected.  GRAFTS:   --  Graft to the distal LAD: The graft was a LIMA. Graft  angiography showed no evidence of disease.  --  Graft to the 1st obtuse marginal: The graft was a saphenous vein graft  from the aorta. Graft angiography showed severe atherosclerosis.  COMPLICATIONS: There were no complications.  DIAGNOSTIC IMPRESSIONS: Patent LIMA to LAD.  Severe diffuse circumflex disease from proximal to distal. (long area-  60-70mm)  Severe atheroscleortic/fibrotic ostial/proximal SVG to OM system. PCI  performed with 1 ELOY.  < from: Cardiac Cath Lab - Adult (06.01.20 @ 16:03) >  INTERVENTIONAL IMPRESSIONS: Patent LIMA to LAD.  Severe diffuse circumflex disease from proximal to distal. (long area-  60-70mm)  Severe atheroscleortic/fibrotic ostial/proximal SVG to OM system. PCI  performed with 1 ELOY.    EKG 06/02/2020: Normal Sinus rhythm HR 69   Dressing removed from R groin: site benign.  No bleeding/hematoma/ecchymosis.  + palp pedal pulse b/l     Review of system- Rest of the review of system are normal except mentioned in HPI    T(C): 36.6 (06-02-20 @ 04:38), Max: 36.8 (06-01-20 @ 14:35)  HR: 85 (06-02-20 @ 04:38) (62 - 85)  BP: 159/75 (06-02-20 @ 04:38) (108/67 - 182/77)  RR: 16 (06-02-20 @ 04:38) (14 - 16)  SpO2: 95% (06-02-20 @ 04:38) (94% - 98%)      T(C): 36.6 (06-02-20 @ 04:38), Max: 36.8 (06-01-20 @ 14:35)  HR: 85 (06-02-20 @ 04:38) (62 - 85)  BP: 159/75 (06-02-20 @ 04:38) (108/67 - 182/77)  RR: 16 (06-02-20 @ 04:38) (14 - 16)  SpO2: 95% (06-02-20 @ 04:38) (94% - 98%)    LABS:                        13.0   3.80  )-----------( 152      ( 02 Jun 2020 06:34 )             40.1     06-02    139  |  104  |  33.0<H>  ----------------------------<  92  4.5   |  22.0  |  1.55<H>    Ca    9.2      02 Jun 2020 06:32  Mg     1.6     06-02      PT/INR - ( 02 Jun 2020 06:33 )   PT: 16.2 sec;   INR: 1.42 ratio    PTT - ( 02 Jun 2020 06:33 )  PTT:37.4 sec    PHYSICAL EXAM:  Vital Signs Last 24 Hrs  T(C): 36.6 (02 Jun 2020 04:38), Max: 36.8 (01 Jun 2020 14:35)  T(F): 97.8 (02 Jun 2020 04:38), Max: 98.2 (01 Jun 2020 14:35)  HR: 85 (02 Jun 2020 04:38) (62 - 85)  BP: 159/75 (02 Jun 2020 04:38) (108/67 - 182/77)  RR: 16 (02 Jun 2020 04:38) (14 - 16)  SpO2: 95% (02 Jun 2020 04:38) (94% - 98%)      GENERAL: NAD, well-groomed, well-developed  HEAD:  Atraumatic, Normocephalic  NECK: Supple, No JVD  CHEST/LUNG: Clear to auscultation bilaterally; No rales, rhonchi, wheezing, or rubs  HEART: Regular rate and rhythm; No murmurs, rubs, or gallops  ABDOMEN: Soft, Nontender, Nondistended; Bowel sounds present x 4  EXTREMITIES:  2+ Peripheral Pulses. No Rashes, no edema, no clubbing, or cyanosis,   MUSCULOSKELTAL- No muscle tenderness, CNS- alert, oriented  X3 , non focal deficit     Assessment and Plan  88 year old male with PMHx of MI (1973) then s/p CABG (1996) and PCI (ELOY x 2 to D1 05/2019), ischemic CM HFrEF (EF 35% 06/19) s/p [employer] ICD, Afib (on coumadin), CVA, HTN, HLD, and DMII who presented to the ED after being hypotensive at his Cardiologist Dr. Sinclair's.  S/p cardiac cath. PCI performed to SVG to OM with 1 ELOY    1- s/p Cath 06/ 01    Plan   Continue meds Imdur to 30 mg, Plavix and Coumadin.   cardiac rehab info provided/referral and communication to cardiac rehab completed  Low fat and sodium diet recommended   Patient educated about groin site care, signs and symptoms of complication post procedure, and plan of care moving forward. Patient verbalized understanding with teach-back. [place] [facility]                          [address] [place] [address]                       Phone: [phone]. Fax:[phone]      HPI: 88 year old male with PMHx of MI (1973) then s/p CABG (1996) and PCI (ELOY x 2 to D1 05/2019), ischemic CM HFrEF (EF 35% 06/19) s/p [employer] ICD, Afib (on coumadin), CVA, HTN, HLD, and DMII who presented to the ED after being hypotensive at his Cardiologist Dr. Sinclair's office. Patient states that for the last few months he has been experiencing dyspnea with very minimal exertion and with daily activities like making his bed. Patient states that in the last month he also began to get some vague chest tightness and shoulder pain at the same time he felt short of breath, and it would also resolve with rest. Patient states initially this was intermittent but now becoming more constant. states when has chest tightness sometimes has burning sensation in chest as well. Patient states he was complaining of lightheadedness and dizziness thsi AM and was found to have a low bp. Patient was then given IV fluids in the doctors office and EMS, and when he arrived in the ED his BP was 119/65. Patient states that his symptoms resolved with the IV fluids. Patient denies any fevers, chills, syncope, PND, LE edema, abdominal pain, N/V/D, or headache.     Hospitalization course:   In ED 05/29:  /92    RR20  T 98  97% sat RA, EKG   T wave inversion c/w lateral ischemia, CXR clear, trop 0.09, asa and IV heparin  cardiology consulted    Cath summary: 06.01.20 @ 16:03  VENTRICLES: No LV gram was performed; however, a recent echocardiogram  demonstratedan EF of 30 %.  CORONARY VESSELS: The coronary circulation is left dominant.  LM:   --  Distal left main: There was a 30 % stenosis.  LAD:   --  Proximal LAD: There was a 100 % stenosis.  CX:   --  Proximal circumflex: There was a diffuse 80 % stenosis.  --  Mid circumflex: There was a diffuse 80 % stenosis.  --  Distal circumflex: There was a diffuse 80 % stenosis.  RCA:   --  RCA: This vessel was not injected.  GRAFTS:   --  Graft to the distal LAD: The graft was a LIMA. Graft  angiography showed no evidence of disease.  --  Graft to the 1st obtuse marginal: The graft was a saphenous vein graft  from the aorta. Graft angiography showed severe atherosclerosis.  COMPLICATIONS: There were no complications.  DIAGNOSTIC IMPRESSIONS: Patent LIMA to LAD.  Severe diffuse circumflex disease from proximal to distal. (long area-  60-70mm)  Severe atheroscleortic/fibrotic ostial/proximal SVG to OM system. PCI  performed with 1 ELOY.  < from: Cardiac Cath Lab - Adult (06.01.20 @ 16:03) >  INTERVENTIONAL IMPRESSIONS: Patent LIMA to LAD.  Severe diffuse circumflex disease from proximal to distal. (long area-  60-70mm)  Severe atheroscleortic/fibrotic ostial/proximal SVG to OM system. PCI  performed with 1 ELOY.    EKG 06/02/2020: Normal Sinus rhythm HR 69. Some T waves abnormalities. No ST elevations or depressions    Review of system-  CONSTITUTIONAL: No weakness, fevers or chills  NECK: No JVD No pain or stiffness  RESPIRATORY: No cough, wheezing, hemoptysis; +dyspnea on exertion  CARDIOVASCULAR: No chest pain, palpitations, [place]  GASTROINTESTINAL No abdominal or epigastric pain. No nausea, vomiting, or hematemesis; No diarrhea or constipation.   EXTREMITIES: FULL ROM, no weakness + 2 pulses x 4. No cyanosis  Dressing removed from R groin: site benign.  No bleeding/hematoma/ecchymosis.  + palp pedal pulse b/l   NEUROLOGICAL: No dizziness  	  All other review of systems is negative unless indicated above    MEDICATIONS  (STANDING):  allopurinol 300 milliGRAM(s) Oral daily  atorvastatin 40 milliGRAM(s) Oral at bedtime  carvedilol 12.5 milliGRAM(s) Oral every 12 hours  clopidogrel Tablet 75 milliGRAM(s) Oral daily  enoxaparin Injectable 40 milliGRAM(s) SubCutaneous daily  fenofibrate Tablet 145 milliGRAM(s) Oral daily  folic acid 1 milliGRAM(s) Oral daily  isosorbide   mononitrate ER Tablet (IMDUR) 30 milliGRAM(s) Oral daily  QUEtiapine 12.5 milliGRAM(s) Oral at bedtime  sodium chloride 0.9%. 1000 milliLiter(s) (100 mL/Hr) IV Continuous <Continuous>    Vitals  T(C): 36.6 (06-02-20 @ 04:38), Max: 36.8 (06-01-20 @ 14:35)  HR: 85 (06-02-20 @ 04:38) (62 - 85)  BP: 159/75 (06-02-20 @ 04:38) (108/67 - 182/77)  RR: 16 (06-02-20 @ 04:38) (14 - 16)  SpO2: 95% (06-02-20 @ 04:38) (94% - 98%)    LABS:                        13.0   3.80  )-----------( 152      ( 02 Jun 2020 06:34 )             40.1     06-02    139  |  104  |  33.0<H>  ----------------------------<  92  4.5   |  22.0  |  1.55<H>    Ca    9.2      02 Jun 2020 06:32  Mg     1.6     06-02      PT/INR - ( 02 Jun 2020 06:33 )   PT: 16.2 sec;   INR: 1.42 ratio    PTT - ( 02 Jun 2020 06:33 )  PTT:37.4 sec    PHYSICAL EXAM:  Vital Signs Last 24 Hrs  T(C): 36.6 (02 Jun 2020 04:38), Max: 36.8 (01 Jun 2020 14:35)  T(F): 97.8 (02 Jun 2020 04:38), Max: 98.2 (01 Jun 2020 14:35)  HR: 85 (02 Jun 2020 04:38) (62 - 85)  BP: 159/75 (02 Jun 2020 04:38) (108/67 - 182/77)  RR: 16 (02 Jun 2020 04:38) (14 - 16)  SpO2: 95% (02 Jun 2020 04:38) (94% - 98%)      GENERAL: NAD, well-groomed, well-developed  HEAD:  Atraumatic, Normocephalic  NECK: Supple, No JVD  CHEST/LUNG: Clear to auscultation bilaterally; No rales, rhonchi, wheezing, or rubs  HEART: Regular rate and rhythm; No murmurs, rubs, or gallops  ABDOMEN: Soft, Nontender, Nondistended; Bowel sounds present x 4  EXTREMITIES:  2+ Peripheral Pulses. No Rashes, no edema, no clubbing, or cyanosis,   MUSCULOSKELTAL- No muscle tenderness, CNS- alert, oriented  X3 , non focal deficit     Assessment and Plan  88 year old male with PMHx of MI (1973) then s/p CABG (1996) and PCI (ELOY x 2 to D1 05/2019), ischemic CM HFrEF (EF 35% 06/19) s/p [employer] ICD, Afib (on coumadin), CVA, HTN, HLD, and DMII who presented to the ED after being hypotensive at his Cardiologist Dr. Sinclair's.  S/p cardiac cath. PCI performed to SVG to OM with 1 ELOY    1- s/p Cath 06/ 01    Plan   Continue meds Imdur to 30 mg, Plavix and Coumadin. DC aspirin. Pt is a high risk for bleeding  Continue with Lipitor and BB. No ACE/ARB 2/2 CKD  Cardiac rehab info provided/referral and communication to cardiac rehab completed  Pt hemodynamically stable    Patient educated about groin site care, signs and symptoms of complication post procedure, and plan of care moving forward. Patient verbalized understanding with teach-back.    Pt agrees with assessment and plan. All the questions answered No further questions at this time

## 2020-06-02 NOTE — DISCHARGE NOTE NURSING/CASE MANAGEMENT/SOCIAL WORK - PATIENT PORTAL LINK FT
You can access the FollowMyHealth Patient Portal offered by [facility] by registering at the following website: [email or web address]. By joining [employer]s FollowMyHealth portal, you will also be able to view your health information using other applications (apps) compatible with our system.

## 2020-06-02 NOTE — PROGRESS NOTE ADULT - ATTENDING COMMENTS
unstable angina and NSTEMI. abnormal stress test. awaiting cardiac cath . Possible PCUI.  dual antiplatelet therapy. antianginal therapy.
s/p PCI in Cx territory.    dual antiplatelet therapy . No further in-patient cardiac work-up/management is needed.  Follow-up in cardiology office in 2 weeks.

## 2020-06-08 ENCOUNTER — APPOINTMENT (OUTPATIENT)
Dept: CARDIOLOGY | Facility: CLINIC | Age: 85
End: 2020-06-08
Payer: MEDICARE

## 2020-06-08 ENCOUNTER — NON-APPOINTMENT (OUTPATIENT)
Age: 85
End: 2020-06-08

## 2020-06-08 VITALS
DIASTOLIC BLOOD PRESSURE: 40 MMHG | HEART RATE: 66 BPM | SYSTOLIC BLOOD PRESSURE: 100 MMHG | OXYGEN SATURATION: 95 % | WEIGHT: 200 LBS | BODY MASS INDEX: 29.62 KG/M2 | HEIGHT: 69 IN

## 2020-06-08 LAB
INR PPP: 3.5 RATIO
POCT-PROTHROMBIN TIME: 41.5 SECS

## 2020-06-08 PROCEDURE — 93000 ELECTROCARDIOGRAM COMPLETE: CPT

## 2020-06-08 PROCEDURE — 99214 OFFICE O/P EST MOD 30 MIN: CPT | Mod: 25

## 2020-06-08 RX ORDER — SACUBITRIL AND VALSARTAN 24; 26 MG/1; MG/1
24-26 TABLET, FILM COATED ORAL
Qty: 180 | Refills: 3 | Status: DISCONTINUED | COMMUNITY
Start: 2019-07-15 | End: 2020-06-08

## 2020-06-08 NOTE — REVIEW OF SYSTEMS
[Eyeglasses] : currently wearing eyeglasses [Joint Pain] : joint pain [Negative] : Heme/Lymph [see HPI] : see HPI [Feeling Fatigued] : feeling fatigued [Recent Weight Loss (___ Lbs)] : no recent weight loss [Blurry Vision] : no blurred vision [Shortness Of Breath] : no shortness of breath [Dyspnea on exertion] : not dyspnea during exertion [Chest Pain] : no chest pain [Chest  Pressure] : no chest pressure [Lower Ext Edema] : no extremity edema [Leg Claudication] : no intermittent leg claudication [Muscle Cramps] : no muscle cramps [Palpitations] : no palpitations

## 2020-06-08 NOTE — PHYSICAL EXAM
[General Appearance - Well Developed] : well developed [Normal Appearance] : normal appearance [General Appearance - Well Nourished] : well nourished [Well Groomed] : well groomed [General Appearance - In No Acute Distress] : no acute distress [No Deformities] : no deformities [Normal Conjunctiva] : the conjunctiva exhibited no abnormalities [Eyelids - No Xanthelasma] : the eyelids demonstrated no xanthelasmas [Normal Oral Mucosa] : normal oral mucosa [No Oral Pallor] : no oral pallor [No Oral Cyanosis] : no oral cyanosis [Respiration, Rhythm And Depth] : normal respiratory rhythm and effort [Exaggerated Use Of Accessory Muscles For Inspiration] : no accessory muscle use [Auscultation Breath Sounds / Voice Sounds] : lungs were clear to auscultation bilaterally [Heart Sounds] : normal S1 and S2 [Murmurs] : no murmurs present [Heart Rate And Rhythm] : heart rate and rhythm were normal [Arterial Pulses Normal] : the arterial pulses were normal [Edema] : no peripheral edema present [Abdomen Soft] : soft [Abdomen Tenderness] : non-tender [Abnormal Walk] : normal gait [Abdomen Mass (___ Cm)] : no abdominal mass palpated [Nail Clubbing] : no clubbing of the fingernails [Gait - Sufficient For Exercise Testing] : the gait was sufficient for exercise testing [Cyanosis, Localized] : no localized cyanosis [Petechial Hemorrhages (___cm)] : no petechial hemorrhages [] : no ischemic changes [Impaired Insight] : insight and judgment were intact [Oriented To Time, Place, And Person] : oriented to person, place, and time [No Anxiety] : not feeling anxious [Affect] : the affect was normal [Mood] : the mood was normal [FreeTextEntry1] : no JVD or bruits

## 2020-07-01 ENCOUNTER — APPOINTMENT (OUTPATIENT)
Dept: CARDIOLOGY | Facility: CLINIC | Age: 85
End: 2020-07-01
Payer: MEDICARE

## 2020-07-01 ENCOUNTER — NON-APPOINTMENT (OUTPATIENT)
Age: 85
End: 2020-07-01

## 2020-07-01 VITALS
BODY MASS INDEX: 29.62 KG/M2 | SYSTOLIC BLOOD PRESSURE: 110 MMHG | HEIGHT: 69 IN | HEART RATE: 68 BPM | DIASTOLIC BLOOD PRESSURE: 54 MMHG | OXYGEN SATURATION: 95 % | WEIGHT: 200 LBS

## 2020-07-01 DIAGNOSIS — R53.83 OTHER FATIGUE: ICD-10-CM

## 2020-07-01 PROCEDURE — 93000 ELECTROCARDIOGRAM COMPLETE: CPT

## 2020-07-01 PROCEDURE — 99214 OFFICE O/P EST MOD 30 MIN: CPT | Mod: 25

## 2020-07-01 NOTE — PHYSICAL EXAM
[General Appearance - Well Developed] : well developed [Normal Appearance] : normal appearance [Well Groomed] : well groomed [General Appearance - Well Nourished] : well nourished [No Deformities] : no deformities [General Appearance - In No Acute Distress] : no acute distress [Normal Conjunctiva] : the conjunctiva exhibited no abnormalities [Eyelids - No Xanthelasma] : the eyelids demonstrated no xanthelasmas [No Oral Pallor] : no oral pallor [No Oral Cyanosis] : no oral cyanosis [Normal Oral Mucosa] : normal oral mucosa [Respiration, Rhythm And Depth] : normal respiratory rhythm and effort [Exaggerated Use Of Accessory Muscles For Inspiration] : no accessory muscle use [Heart Rate And Rhythm] : heart rate and rhythm were normal [Auscultation Breath Sounds / Voice Sounds] : lungs were clear to auscultation bilaterally [Murmurs] : no murmurs present [Heart Sounds] : normal S1 and S2 [Edema] : no peripheral edema present [Arterial Pulses Normal] : the arterial pulses were normal [Abdomen Tenderness] : non-tender [Abdomen Soft] : soft [Abdomen Mass (___ Cm)] : no abdominal mass palpated [Abnormal Walk] : normal gait [Gait - Sufficient For Exercise Testing] : the gait was sufficient for exercise testing [Nail Clubbing] : no clubbing of the fingernails [Cyanosis, Localized] : no localized cyanosis [Petechial Hemorrhages (___cm)] : no petechial hemorrhages [] : no ischemic changes [Impaired Insight] : insight and judgment were intact [Oriented To Time, Place, And Person] : oriented to person, place, and time [Affect] : the affect was normal [Mood] : the mood was normal [No Anxiety] : not feeling anxious [FreeTextEntry1] : no JVD or bruits

## 2020-07-01 NOTE — REVIEW OF SYSTEMS
[Feeling Fatigued] : feeling fatigued [Eyeglasses] : currently wearing eyeglasses [see HPI] : see HPI [Joint Pain] : joint pain [Negative] : Heme/Lymph [Recent Weight Loss (___ Lbs)] : no recent weight loss [Blurry Vision] : no blurred vision [Dyspnea on exertion] : dyspnea during exertion [Shortness Of Breath] : shortness of breath [Chest Pain] : no chest pain [Chest  Pressure] : no chest pressure [Lower Ext Edema] : no extremity edema [Palpitations] : no palpitations [Leg Claudication] : no intermittent leg claudication [Muscle Cramps] : no muscle cramps

## 2020-07-16 ENCOUNTER — RX CHANGE (OUTPATIENT)
Age: 85
End: 2020-07-16

## 2020-07-18 ENCOUNTER — TRANSCRIPTION ENCOUNTER (OUTPATIENT)
Age: 85
End: 2020-07-18

## 2020-07-20 ENCOUNTER — RX RENEWAL (OUTPATIENT)
Age: 85
End: 2020-07-20

## 2020-07-22 ENCOUNTER — APPOINTMENT (OUTPATIENT)
Dept: CARDIOLOGY | Facility: CLINIC | Age: 85
End: 2020-07-22

## 2020-07-28 ENCOUNTER — TRANSCRIPTION ENCOUNTER (OUTPATIENT)
Age: 85
End: 2020-07-28

## 2020-08-11 ENCOUNTER — RX RENEWAL (OUTPATIENT)
Age: 85
End: 2020-08-11

## 2020-08-11 ENCOUNTER — APPOINTMENT (OUTPATIENT)
Dept: ELECTROPHYSIOLOGY | Facility: CLINIC | Age: 85
End: 2020-08-11
Payer: MEDICARE

## 2020-08-11 VITALS
HEART RATE: 84 BPM | BODY MASS INDEX: 30.45 KG/M2 | OXYGEN SATURATION: 95 % | HEIGHT: 67 IN | DIASTOLIC BLOOD PRESSURE: 70 MMHG | WEIGHT: 194 LBS | SYSTOLIC BLOOD PRESSURE: 138 MMHG

## 2020-08-11 LAB — INR PPP: 1 RATIO

## 2020-08-11 PROCEDURE — 93283 PRGRMG EVAL IMPLANTABLE DFB: CPT

## 2020-08-11 PROCEDURE — 93290 INTERROG DEV EVAL ICPMS IP: CPT | Mod: 26

## 2020-08-11 PROCEDURE — 85610 PROTHROMBIN TIME: CPT | Mod: QW

## 2020-08-11 NOTE — PROCEDURE
[No] : not [NSR] : normal sinus rhythm [Normal] : The battery status is normal. [ICD] : Implantable cardioverter-defibrillator [Threshold Testing Performed] : Threshold testing was performed [Lead Imp:  ___ohms] : lead impedance was [unfilled] ohms [Sensing Amplitude ___mv] : sensing amplitude was [unfilled] mv [___ ms] : [unfilled] ms [___V @] : [unfilled] V [None] : none [de-identified] : Medtronic [Sense ___ %] : Sense [unfilled]% [de-identified] : 8/21/2014 [de-identified] : Kanu [de-identified] : [id number] [de-identified] : 5 years longevity [de-identified] : AAI-DDD [de-identified] : 50

## 2020-08-11 NOTE — PHYSICAL EXAM
[General Appearance - Well Developed] : well developed [Normal Appearance] : normal appearance [Heart Rate And Rhythm] : heart rate and rhythm were normal [Heart Sounds] : normal S1 and S2

## 2020-08-20 ENCOUNTER — APPOINTMENT (OUTPATIENT)
Dept: FAMILY MEDICINE | Facility: CLINIC | Age: 85
End: 2020-08-20
Payer: MEDICARE

## 2020-08-20 LAB — INR PPP: 1.1 RATIO

## 2020-08-20 PROCEDURE — 85610 PROTHROMBIN TIME: CPT | Mod: QW

## 2020-08-20 PROCEDURE — 99212 OFFICE O/P EST SF 10 MIN: CPT | Mod: 25

## 2020-08-25 ENCOUNTER — APPOINTMENT (OUTPATIENT)
Dept: FAMILY MEDICINE | Facility: CLINIC | Age: 85
End: 2020-08-25
Payer: MEDICARE

## 2020-08-25 LAB — INR PPP: 4.2 RATIO

## 2020-08-25 PROCEDURE — 99212 OFFICE O/P EST SF 10 MIN: CPT | Mod: 25

## 2020-08-25 PROCEDURE — 85610 PROTHROMBIN TIME: CPT | Mod: QW

## 2020-09-01 ENCOUNTER — APPOINTMENT (OUTPATIENT)
Dept: FAMILY MEDICINE | Facility: CLINIC | Age: 85
End: 2020-09-01
Payer: MEDICARE

## 2020-09-01 LAB — INR PPP: 3.3 RATIO

## 2020-09-01 PROCEDURE — 85610 PROTHROMBIN TIME: CPT | Mod: QW

## 2020-09-09 ENCOUNTER — APPOINTMENT (OUTPATIENT)
Dept: FAMILY MEDICINE | Facility: CLINIC | Age: 85
End: 2020-09-09
Payer: MEDICARE

## 2020-09-09 LAB — INR PPP: 2.8 RATIO

## 2020-09-09 PROCEDURE — 99212 OFFICE O/P EST SF 10 MIN: CPT | Mod: 25

## 2020-09-09 PROCEDURE — 90662 IIV NO PRSV INCREASED AG IM: CPT

## 2020-09-09 PROCEDURE — 85610 PROTHROMBIN TIME: CPT | Mod: QW

## 2020-09-09 PROCEDURE — G0008: CPT

## 2020-10-07 ENCOUNTER — APPOINTMENT (OUTPATIENT)
Dept: FAMILY MEDICINE | Facility: CLINIC | Age: 85
End: 2020-10-07
Payer: MEDICARE

## 2020-10-07 VITALS
HEART RATE: 77 BPM | SYSTOLIC BLOOD PRESSURE: 112 MMHG | BODY MASS INDEX: 29.66 KG/M2 | TEMPERATURE: 97.4 F | DIASTOLIC BLOOD PRESSURE: 60 MMHG | HEIGHT: 67 IN | RESPIRATION RATE: 16 BRPM | OXYGEN SATURATION: 97 % | WEIGHT: 189 LBS

## 2020-10-07 VITALS — DIASTOLIC BLOOD PRESSURE: 70 MMHG | SYSTOLIC BLOOD PRESSURE: 110 MMHG | HEART RATE: 72 BPM | RESPIRATION RATE: 16 BRPM

## 2020-10-07 DIAGNOSIS — C49.A0 GASTROINTESTINAL STOMACL TUMOR,UNSPECIFIED SITE: ICD-10-CM

## 2020-10-07 DIAGNOSIS — A04.72 ENTEROCOLITIS DUE TO CLOSTRIDIUM DIFFICILE, NOT SPECIFIED AS RECURRENT: ICD-10-CM

## 2020-10-07 PROCEDURE — 85610 PROTHROMBIN TIME: CPT | Mod: QW

## 2020-10-07 PROCEDURE — 99214 OFFICE O/P EST MOD 30 MIN: CPT | Mod: 25

## 2020-10-07 RX ORDER — BENZONATATE 200 MG/1
200 CAPSULE ORAL 3 TIMES DAILY
Qty: 30 | Refills: 0 | Status: COMPLETED | COMMUNITY
Start: 2020-02-24 | End: 2020-10-07

## 2020-10-07 RX ORDER — NITROGLYCERIN 0.4 MG/1
0.4 TABLET SUBLINGUAL
Qty: 30 | Refills: 3 | Status: DISCONTINUED | COMMUNITY
Start: 2020-05-19 | End: 2020-10-07

## 2020-10-07 NOTE — HISTORY OF PRESENT ILLNESS
[Coronary Artery Disease] : Coronary Artery Disease [Hyperlipidemia] : Hyperlipidemia [Hypertension] : Hypertension [Other: ___] : [unfilled] [Does not check BP] : The patient is not checking blood pressure [120/80] : Target blood pressure is 120/80 [Target goal met] : BP target goal met [Doing Well] : Patient is doing well [High Intensity therapy] : Patient is currently on high intensity statin therapy [Well Controlled] : Overall status has been well controlled [FreeTextEntry6] : c-diff gone slight constipation has new  girlfriend wants  meds for ED WALKS UP  STAIRS WITHOUT SOB  [Symptoms Limit Activities] : Symptoms do not limit ~his/her~ activities

## 2020-10-07 NOTE — ASSESSMENT
[FreeTextEntry1] : pt  wants  meds for ED  HAS NOT HAD ANY CP SOB  FEELING WELL HAS NOT USED ANY NITRO.  PT ADVISED  TO STOP ISOSORBIDE AND CAD AND CHF  STABLE  WILL TRY VIAGRA PT  ADVISE HE MADE  HAVE A SLIGHT RISK OF  CAD BUT  HAS  BEEN STABLE WILL GIVE RX FOR VIAGRA  WHEN READY

## 2020-10-07 NOTE — PHYSICAL EXAM
[No Acute Distress] : no acute distress [PERRL] : pupils equal round and reactive to light [Normal Oropharynx] : the oropharynx was normal [No Lymphadenopathy] : no lymphadenopathy [Clear to Auscultation] : lungs were clear to auscultation bilaterally [Regular Rhythm] : with a regular rhythm [Normal S1, S2] : normal S1 and S2 [No Spinal Tenderness] : no spinal tenderness [Normal] : no rash [Normal Insight/Judgement] : insight and judgment were intact

## 2020-10-07 NOTE — DATA REVIEWED
[FreeTextEntry1] : INR  2.6 same dose do not take  any supplements unless  checked eat consistent diet\par

## 2020-10-07 NOTE — REVIEW OF SYSTEMS
[Constipation] : constipation [Back Pain] : back pain [Chest Pain] : no chest pain [Palpitations] : no palpitations [Shortness Of Breath] : no shortness of breath [Abdominal Pain] : no abdominal pain [Diarrhea] : no diarrhea [FreeTextEntry9] : HIP AND  SHOULDER PAIN MOSTLY AT NIGHT

## 2020-10-11 ENCOUNTER — RX RENEWAL (OUTPATIENT)
Age: 85
End: 2020-10-11

## 2020-10-21 ENCOUNTER — APPOINTMENT (OUTPATIENT)
Dept: FAMILY MEDICINE | Facility: CLINIC | Age: 85
End: 2020-10-21
Payer: MEDICARE

## 2020-10-21 LAB
INR PPP: 2.6 RATIO
INR PPP: 3.3 RATIO

## 2020-10-21 PROCEDURE — 99072 ADDL SUPL MATRL&STAF TM PHE: CPT

## 2020-10-21 PROCEDURE — 85610 PROTHROMBIN TIME: CPT | Mod: QW

## 2020-10-21 PROCEDURE — 99212 OFFICE O/P EST SF 10 MIN: CPT | Mod: 25

## 2020-10-28 ENCOUNTER — APPOINTMENT (OUTPATIENT)
Dept: FAMILY MEDICINE | Facility: CLINIC | Age: 85
End: 2020-10-28
Payer: MEDICARE

## 2020-10-28 DIAGNOSIS — N52.9 MALE ERECTILE DYSFUNCTION, UNSPECIFIED: ICD-10-CM

## 2020-10-28 PROCEDURE — 99072 ADDL SUPL MATRL&STAF TM PHE: CPT

## 2020-10-28 PROCEDURE — 99213 OFFICE O/P EST LOW 20 MIN: CPT | Mod: 25

## 2020-10-28 PROCEDURE — 85610 PROTHROMBIN TIME: CPT | Mod: QW

## 2020-10-28 NOTE — HISTORY OF PRESENT ILLNESS
[FreeTextEntry1] : pt here for management of coumadin  [de-identified] : has new  girlfriend  interested in having sex  but has ed  has stopped isordil over  2  wks ago no cp sob ab;e t work around  hous  no sob going up a flight  of stairs

## 2020-10-28 NOTE — ASSESSMENT
[FreeTextEntry1] : discussed  ed and treatment  and  risk involved  with sex but pt  cad  is stable no cp sob active will try low  dose of viagra

## 2020-11-03 ENCOUNTER — APPOINTMENT (OUTPATIENT)
Dept: ELECTROPHYSIOLOGY | Facility: CLINIC | Age: 85
End: 2020-11-03
Payer: MEDICARE

## 2020-11-03 VITALS
BODY MASS INDEX: 28.14 KG/M2 | SYSTOLIC BLOOD PRESSURE: 144 MMHG | OXYGEN SATURATION: 97 % | HEART RATE: 79 BPM | WEIGHT: 190 LBS | HEIGHT: 69 IN | RESPIRATION RATE: 16 BRPM | DIASTOLIC BLOOD PRESSURE: 82 MMHG

## 2020-11-03 PROCEDURE — 93290 INTERROG DEV EVAL ICPMS IP: CPT | Mod: 26

## 2020-11-03 PROCEDURE — 93283 PRGRMG EVAL IMPLANTABLE DFB: CPT

## 2020-11-03 PROCEDURE — 99072 ADDL SUPL MATRL&STAF TM PHE: CPT

## 2020-11-03 NOTE — PROCEDURE
[No] : not [NSR] : normal sinus rhythm [ICD] : Implantable cardioverter-defibrillator [Lead Imp:  ___ohms] : lead impedance was [unfilled] ohms [Sensing Amplitude ___mv] : sensing amplitude was [unfilled] mv [___V @] : [unfilled] V [___ ms] : [unfilled] ms [None] : none [Asense-Vsense ___ %] : Asense-Vsense [unfilled]% [de-identified] : Medtronic [de-identified] : Kanu XT [de-identified] : [id number] [de-identified] : 8/21/2014 [de-identified] : AAI - DDD [de-identified] : 50 [de-identified] : 4.3 years longevity

## 2020-11-03 NOTE — ASSESSMENT
[FreeTextEntry1] : No events recorded or therapies delivered.\par PAF = 0%\par Rhythm is normal sinus.

## 2020-11-03 NOTE — DISCUSSION/SUMMARY
[Routine Follow-up in 3-4 months] : routine follow-up in 3-4 months [Patient] : the patient [FreeTextEntry2] : None [FreeTextEntry1] : Pt wishes to do home remote monitoring but up to this point he does not think it is plugged in and needs help moving furniture.\par He will be scheduled for routine device check in 3-4 months in the office.

## 2020-11-03 NOTE — PHYSICAL EXAM
[General Appearance - Well Developed] : well developed [Normal Appearance] : normal appearance [Well Groomed] : well groomed [General Appearance - Well Nourished] : well nourished [No Deformities] : no deformities [General Appearance - In No Acute Distress] : no acute distress [Heart Rate And Rhythm] : heart rate and rhythm were normal [Heart Sounds] : normal S1 and S2 [Murmurs] : no murmurs present [] : no respiratory distress [Respiration, Rhythm And Depth] : normal respiratory rhythm and effort [Exaggerated Use Of Accessory Muscles For Inspiration] : no accessory muscle use [Auscultation Breath Sounds / Voice Sounds] : lungs were clear to auscultation bilaterally

## 2020-11-05 ENCOUNTER — RX RENEWAL (OUTPATIENT)
Age: 85
End: 2020-11-05

## 2020-11-11 ENCOUNTER — APPOINTMENT (OUTPATIENT)
Dept: FAMILY MEDICINE | Facility: CLINIC | Age: 85
End: 2020-11-11

## 2020-11-23 ENCOUNTER — APPOINTMENT (OUTPATIENT)
Dept: FAMILY MEDICINE | Facility: CLINIC | Age: 85
End: 2020-11-23
Payer: MEDICARE

## 2020-11-23 LAB
INR PPP: 1.6 RATIO
INR PPP: 2.2 RATIO

## 2020-11-23 PROCEDURE — 85610 PROTHROMBIN TIME: CPT | Mod: QW

## 2020-11-23 PROCEDURE — 99212 OFFICE O/P EST SF 10 MIN: CPT | Mod: 25

## 2020-12-08 ENCOUNTER — RX RENEWAL (OUTPATIENT)
Age: 85
End: 2020-12-08

## 2020-12-09 ENCOUNTER — TRANSCRIPTION ENCOUNTER (OUTPATIENT)
Age: 85
End: 2020-12-09

## 2020-12-14 ENCOUNTER — APPOINTMENT (OUTPATIENT)
Dept: FAMILY MEDICINE | Facility: CLINIC | Age: 85
End: 2020-12-14
Payer: MEDICARE

## 2020-12-14 LAB — INR PPP: 2.7 RATIO

## 2020-12-14 PROCEDURE — 99212 OFFICE O/P EST SF 10 MIN: CPT

## 2020-12-14 PROCEDURE — 85610 PROTHROMBIN TIME: CPT | Mod: QW

## 2020-12-14 PROCEDURE — 99072 ADDL SUPL MATRL&STAF TM PHE: CPT

## 2020-12-16 ENCOUNTER — RX RENEWAL (OUTPATIENT)
Age: 85
End: 2020-12-16

## 2020-12-17 ENCOUNTER — RX RENEWAL (OUTPATIENT)
Age: 85
End: 2020-12-17

## 2020-12-21 ENCOUNTER — RX RENEWAL (OUTPATIENT)
Age: 85
End: 2020-12-21

## 2020-12-23 PROBLEM — Z87.09 HISTORY OF ACUTE SINUSITIS: Status: RESOLVED | Noted: 2017-04-17 | Resolved: 2020-12-23

## 2021-01-11 ENCOUNTER — APPOINTMENT (OUTPATIENT)
Dept: FAMILY MEDICINE | Facility: CLINIC | Age: 86
End: 2021-01-11
Payer: MEDICARE

## 2021-01-11 LAB — INR PPP: 2.6 RATIO

## 2021-01-11 PROCEDURE — 99072 ADDL SUPL MATRL&STAF TM PHE: CPT

## 2021-01-11 PROCEDURE — 93793 ANTICOAG MGMT PT WARFARIN: CPT

## 2021-01-11 PROCEDURE — 85610 PROTHROMBIN TIME: CPT | Mod: QW

## 2021-01-12 ENCOUNTER — RX RENEWAL (OUTPATIENT)
Age: 86
End: 2021-01-12

## 2021-01-19 ENCOUNTER — RX RENEWAL (OUTPATIENT)
Age: 86
End: 2021-01-19

## 2021-03-01 ENCOUNTER — RX RENEWAL (OUTPATIENT)
Age: 86
End: 2021-03-01

## 2021-03-08 ENCOUNTER — APPOINTMENT (OUTPATIENT)
Dept: FAMILY MEDICINE | Facility: CLINIC | Age: 86
End: 2021-03-08
Payer: MEDICARE

## 2021-03-08 LAB — INR PPP: 4 RATIO

## 2021-03-08 PROCEDURE — 85610 PROTHROMBIN TIME: CPT | Mod: QW

## 2021-03-08 PROCEDURE — 99072 ADDL SUPL MATRL&STAF TM PHE: CPT

## 2021-03-08 PROCEDURE — 99213 OFFICE O/P EST LOW 20 MIN: CPT | Mod: 25

## 2021-03-12 ENCOUNTER — RX RENEWAL (OUTPATIENT)
Age: 86
End: 2021-03-12

## 2021-03-14 ENCOUNTER — RX RENEWAL (OUTPATIENT)
Age: 86
End: 2021-03-14

## 2021-03-15 ENCOUNTER — APPOINTMENT (OUTPATIENT)
Dept: FAMILY MEDICINE | Facility: CLINIC | Age: 86
End: 2021-03-15

## 2021-03-16 ENCOUNTER — NON-APPOINTMENT (OUTPATIENT)
Age: 86
End: 2021-03-16

## 2021-03-16 ENCOUNTER — APPOINTMENT (OUTPATIENT)
Dept: FAMILY MEDICINE | Facility: CLINIC | Age: 86
End: 2021-03-16
Payer: MEDICARE

## 2021-03-16 VITALS — RESPIRATION RATE: 16 BRPM | HEART RATE: 80 BPM | DIASTOLIC BLOOD PRESSURE: 90 MMHG | SYSTOLIC BLOOD PRESSURE: 130 MMHG

## 2021-03-16 VITALS
SYSTOLIC BLOOD PRESSURE: 130 MMHG | HEIGHT: 69 IN | OXYGEN SATURATION: 95 % | WEIGHT: 202 LBS | TEMPERATURE: 97.2 F | DIASTOLIC BLOOD PRESSURE: 80 MMHG | HEART RATE: 82 BPM | BODY MASS INDEX: 29.92 KG/M2

## 2021-03-16 DIAGNOSIS — K08.9 DISORDER OF TEETH AND SUPPORTING STRUCTURES, UNSPECIFIED: ICD-10-CM

## 2021-03-16 LAB — INR PPP: 1.9 RATIO

## 2021-03-16 PROCEDURE — 99214 OFFICE O/P EST MOD 30 MIN: CPT | Mod: 25

## 2021-03-16 PROCEDURE — 99072 ADDL SUPL MATRL&STAF TM PHE: CPT

## 2021-03-16 PROCEDURE — 93000 ELECTROCARDIOGRAM COMPLETE: CPT | Mod: 59

## 2021-03-16 RX ORDER — BETAMETHASONE DIPROPIONATE 0.5 MG/G
0.05 CREAM, AUGMENTED TOPICAL TWICE DAILY
Qty: 1 | Refills: 0 | Status: COMPLETED | COMMUNITY
Start: 2018-11-12 | End: 2021-03-16

## 2021-03-16 RX ORDER — WARFARIN 4 MG/1
4 TABLET ORAL
Qty: 90 | Refills: 2 | Status: COMPLETED | COMMUNITY
Start: 2017-07-13 | End: 2021-03-16

## 2021-03-16 RX ORDER — CLOTRIMAZOLE AND BETAMETHASONE DIPROPIONATE 10; .5 MG/G; MG/G
1-0.05 CREAM TOPICAL TWICE DAILY
Qty: 45 | Refills: 1 | Status: COMPLETED | COMMUNITY
Start: 2019-03-04 | End: 2021-03-16

## 2021-03-16 RX ORDER — NEOMYCIN AND POLYMYXIN B SULFATES AND DEXAMETHASONE 3.5; 10000; 1 MG/G; [IU]/G; MG/G
3.5-10000-0.1 OINTMENT OPHTHALMIC
Refills: 0 | Status: COMPLETED | COMMUNITY
Start: 2019-07-09 | End: 2021-03-16

## 2021-03-16 RX ORDER — AMOXICILLIN 500 MG/1
500 CAPSULE ORAL 3 TIMES DAILY
Qty: 21 | Refills: 0 | Status: COMPLETED | COMMUNITY
Start: 2021-03-08 | End: 2021-03-16

## 2021-03-16 NOTE — CONSULT LETTER
[Consult Letter:] : I had the pleasure of evaluating your patient, [unfilled]. [( Thank you for referring [unfilled] for consultation for _____ )] : Thank you for referring [unfilled] for consultation for [unfilled] [Consult Closing:] : Thank you very much for allowing me to participate in the care of this patient.  If you have any questions, please do not hesitate to contact me. [Sincerely,] : Sincerely, [Dear  ___] : Dear  [unfilled],

## 2021-03-16 NOTE — REVIEW OF SYSTEMS
[Fever] : no fever [Chills] : no chills [Earache] : no earache [Sore Throat] : no sore throat [Chest Pain] : no chest pain [Shortness Of Breath] : no shortness of breath [Abdominal Pain] : no abdominal pain [Constipation] : no constipation [Diarrhea] : no diarrhea [Dysuria] : no dysuria [Joint Pain] : no joint pain [Skin Rash] : no skin rash [Swollen Glands] : no swollen glands

## 2021-03-16 NOTE — HISTORY OF PRESENT ILLNESS
[Atrial Fibrillation] : atrial fibrillation [Coronary Artery Disease] : coronary artery disease [No Pertinent Pulmonary History] : no history of asthma, COPD, sleep apnea, or smoking [No Adverse Anesthesia Reaction] : no adverse anesthesia reaction in self or family member [Chronic Anticoagulation] : chronic anticoagulation [Diabetes] : diabetes [(Patient denies any chest pain, claudication, dyspnea on exertion, orthopnea, palpitations or syncope)] : Patient denies any chest pain, claudication, dyspnea on exertion, orthopnea, palpitations or syncope [Moderate (4-6 METs)] : Moderate (4-6 METs) [Anticoagulants: _____] : Anticoagulants: [unfilled] [Recent Myocardial Infarction] : no recent myocardial infarction [Implantable Device/Pacemaker] : no implantable device/pacemaker [Chronic Kidney Disease] : no chronic kidney disease [FreeTextEntry1] : tooth extraction  [FreeTextEntry3] : Dr. Mariela Brannon  [FreeTextEntry4] : pt is a 89 yr old male here for clearance  for tooth extraction.  Pt active medical problems include cad  af DM HTN HLD  GOUT

## 2021-03-16 NOTE — ASSESSMENT
[High Risk Surgery - Intraperitoneal, Intrathoracic or Supringuinal Vascular Procedures] : High Risk Surgery - Intraperitoneal, Intrathoracic or Supringuinal Vascular Procedures - No (0) [Ischemic Heart Disease] : Ischemic Heart Disease  - Yes (1) [Congestive Heart Failure] : Congestive Heart Failure - No (0) [Prior Cerebrovascular Accident or TIA] : Prior Cerebrovascular Accident or TIA - No (0) [Creatinine >= 2mg/dL (1 Point)] : Creatinine >= 2mg/dL - No (0) [Insulin-dependent Diabetic (1 Point)] : Insulin-dependent Diabetic - No (0) [1] : 1 , RCRI Class: II, Risk of Post-Op Cardiac Complications: 6.0%, 95% CI for Risk Estimate: 4.9% - 7.4% [Patient Optimized for Surgery] : Patient optimized for surgery [Modify anticoagulant treatment prior to procedure] : Modify anticoagulant treatment prior to procedure [FreeTextEntry4] : acceptable medical condition for surgery HOLD COUMADIN DAY BEFORE  SURGERY cad htn hld af  stable \par  [FreeTextEntry5] : HOLD DAY BEFORE SURGERY

## 2021-03-16 NOTE — PHYSICAL EXAM
[No Acute Distress] : no acute distress [PERRL] : pupils equal round and reactive to light [Normal TMs] : both tympanic membranes were normal [No Lymphadenopathy] : no lymphadenopathy [Supple] : supple [Clear to Auscultation] : lungs were clear to auscultation bilaterally [Regular Rhythm] : with a regular rhythm [No Murmur] : no murmur heard [No Edema] : there was no peripheral edema [Normal] : soft, non-tender, non-distended, no masses palpated, no HSM and normal bowel sounds [Normal Supraclavicular Nodes] : no supraclavicular lymphadenopathy [Normal Posterior Cervical Nodes] : no posterior cervical lymphadenopathy [Normal Anterior Cervical Nodes] : no anterior cervical lymphadenopathy [No CVA Tenderness] : no CVA  tenderness [No Joint Swelling] : no joint swelling [No Rash] : no rash [No Focal Deficits] : no focal deficits [Normal Insight/Judgement] : insight and judgment were intact

## 2021-04-06 ENCOUNTER — APPOINTMENT (OUTPATIENT)
Dept: FAMILY MEDICINE | Facility: CLINIC | Age: 86
End: 2021-04-06
Payer: MEDICARE

## 2021-04-06 LAB — INR PPP: 3.4 RATIO

## 2021-04-06 PROCEDURE — 99212 OFFICE O/P EST SF 10 MIN: CPT | Mod: 25

## 2021-04-06 PROCEDURE — 99072 ADDL SUPL MATRL&STAF TM PHE: CPT

## 2021-04-06 PROCEDURE — 85610 PROTHROMBIN TIME: CPT | Mod: QW

## 2021-04-19 ENCOUNTER — RX RENEWAL (OUTPATIENT)
Age: 86
End: 2021-04-19

## 2021-05-02 ENCOUNTER — RX RENEWAL (OUTPATIENT)
Age: 86
End: 2021-05-02

## 2021-05-04 ENCOUNTER — APPOINTMENT (OUTPATIENT)
Dept: ELECTROPHYSIOLOGY | Facility: CLINIC | Age: 86
End: 2021-05-04
Payer: MEDICARE

## 2021-05-04 VITALS
HEIGHT: 69 IN | SYSTOLIC BLOOD PRESSURE: 130 MMHG | BODY MASS INDEX: 29.18 KG/M2 | DIASTOLIC BLOOD PRESSURE: 54 MMHG | WEIGHT: 197 LBS

## 2021-05-04 LAB
INR PPP: 2.3 RATIO
POCT-PROTHROMBIN TIME: 27 SECS

## 2021-05-04 PROCEDURE — 85610 PROTHROMBIN TIME: CPT | Mod: QW

## 2021-05-04 PROCEDURE — 99072 ADDL SUPL MATRL&STAF TM PHE: CPT

## 2021-05-04 PROCEDURE — 93290 INTERROG DEV EVAL ICPMS IP: CPT | Mod: 26

## 2021-05-04 PROCEDURE — 93283 PRGRMG EVAL IMPLANTABLE DFB: CPT

## 2021-05-04 PROCEDURE — 99211 OFF/OP EST MAY X REQ PHY/QHP: CPT

## 2021-05-04 NOTE — HISTORY OF PRESENT ILLNESS
[SOB] : dyspnea [de-identified] : 89 yr old male with CAD s/p MI, s/p CABG, atrial fib s/p CVA on warfarin, cardiomyopathy s/p ICD who presents for routine ICD interrogation.

## 2021-05-04 NOTE — PROCEDURE
[No] : not [NSR] : normal sinus rhythm [ICD] : Implantable cardioverter-defibrillator [Threshold Testing Performed] : Threshold testing was performed [Lead Imp:  ___ohms] : lead impedance was [unfilled] ohms [Sensing Amplitude ___mv] : sensing amplitude was [unfilled] mv [___V @] : [unfilled] V [___ ms] : [unfilled] ms [None] : none [Asense-Vsense ___ %] : Asense-Vsense [unfilled]% [de-identified] : CARIDAD [de-identified] : Kanu PERDOMO DR [de-identified] : [id number] [de-identified] : 8/21/2014 [de-identified] : AAI-DDD [de-identified] : 50 [de-identified] : 3.4 yrs longevity remaining [de-identified] : No events recorded or therapies delivered.\par Pt c/o occasional SOB.\par His optivol status is within normal limits\par F/U with cardiology.\par Pt encouraged to do remote monitoring, he will return in 3 months.

## 2021-05-04 NOTE — PHYSICAL EXAM
[General Appearance - Well Developed] : well developed [Normal Appearance] : normal appearance [Well Groomed] : well groomed [General Appearance - Well Nourished] : well nourished [No Deformities] : no deformities [General Appearance - In No Acute Distress] : no acute distress [Heart Rate And Rhythm] : heart rate and rhythm were normal [Heart Sounds] : normal S1 and S2 [Murmurs] : no murmurs present [] : no respiratory distress [Respiration, Rhythm And Depth] : normal respiratory rhythm and effort [Exaggerated Use Of Accessory Muscles For Inspiration] : no accessory muscle use [Auscultation Breath Sounds / Voice Sounds] : lungs were clear to auscultation bilaterally [Clean] : clean [Dry] : dry [Well-Healed] : well-healed

## 2021-05-13 ENCOUNTER — APPOINTMENT (OUTPATIENT)
Dept: CARDIOLOGY | Facility: CLINIC | Age: 86
End: 2021-05-13
Payer: MEDICARE

## 2021-05-13 ENCOUNTER — NON-APPOINTMENT (OUTPATIENT)
Age: 86
End: 2021-05-13

## 2021-05-13 VITALS
OXYGEN SATURATION: 95 % | HEART RATE: 82 BPM | BODY MASS INDEX: 29.18 KG/M2 | DIASTOLIC BLOOD PRESSURE: 50 MMHG | HEIGHT: 69 IN | WEIGHT: 197 LBS | SYSTOLIC BLOOD PRESSURE: 110 MMHG

## 2021-05-13 PROCEDURE — 93000 ELECTROCARDIOGRAM COMPLETE: CPT

## 2021-05-13 PROCEDURE — 99072 ADDL SUPL MATRL&STAF TM PHE: CPT

## 2021-05-13 PROCEDURE — 99214 OFFICE O/P EST MOD 30 MIN: CPT | Mod: 25

## 2021-05-14 LAB
INR PPP: 2.5 RATIO
POCT-PROTHROMBIN TIME: 30.3 SECS

## 2021-05-20 ENCOUNTER — RX RENEWAL (OUTPATIENT)
Age: 86
End: 2021-05-20

## 2021-05-27 LAB
25(OH)D3 SERPL-MCNC: 48 NG/ML
ALBUMIN SERPL ELPH-MCNC: 4.8 G/DL
ALP BLD-CCNC: 77 U/L
ALT SERPL-CCNC: 23 U/L
ANION GAP SERPL CALC-SCNC: 12 MMOL/L
AST SERPL-CCNC: 31 U/L
BASOPHILS # BLD AUTO: 0.05 K/UL
BASOPHILS NFR BLD AUTO: 1.4 %
BILIRUB SERPL-MCNC: 0.4 MG/DL
BUN SERPL-MCNC: 30 MG/DL
CALCIUM SERPL-MCNC: 10.1 MG/DL
CHLORIDE SERPL-SCNC: 112 MMOL/L
CHOLEST SERPL-MCNC: 133 MG/DL
CO2 SERPL-SCNC: 21 MMOL/L
CREAT SERPL-MCNC: 1.34 MG/DL
EOSINOPHIL # BLD AUTO: 0.18 K/UL
EOSINOPHIL NFR BLD AUTO: 5.2 %
ESTIMATED AVERAGE GLUCOSE: 128 MG/DL
FOLATE SERPL-MCNC: >20 NG/ML
GLUCOSE SERPL-MCNC: 124 MG/DL
HBA1C MFR BLD HPLC: 6.1 %
HCT VFR BLD CALC: 44.3 %
HDLC SERPL-MCNC: 40 MG/DL
HGB BLD-MCNC: 14.2 G/DL
IMM GRANULOCYTES NFR BLD AUTO: 0.3 %
LDLC SERPL CALC-MCNC: 74 MG/DL
LYMPHOCYTES # BLD AUTO: 1.17 K/UL
LYMPHOCYTES NFR BLD AUTO: 33.8 %
MAGNESIUM SERPL-MCNC: 2 MG/DL
MAN DIFF?: NORMAL
MCHC RBC-ENTMCNC: 29.5 PG
MCHC RBC-ENTMCNC: 32.1 GM/DL
MCV RBC AUTO: 92.1 FL
MONOCYTES # BLD AUTO: 0.55 K/UL
MONOCYTES NFR BLD AUTO: 15.9 %
NEUTROPHILS # BLD AUTO: 1.5 K/UL
NEUTROPHILS NFR BLD AUTO: 43.4 %
NONHDLC SERPL-MCNC: 93 MG/DL
PLATELET # BLD AUTO: 157 K/UL
POTASSIUM SERPL-SCNC: 5.3 MMOL/L
PROT SERPL-MCNC: 6.9 G/DL
RBC # BLD: 4.81 M/UL
RBC # FLD: 13.9 %
SODIUM SERPL-SCNC: 145 MMOL/L
TRIGL SERPL-MCNC: 93 MG/DL
TSH SERPL-ACNC: 1.43 UIU/ML
VIT B12 SERPL-MCNC: 1045 PG/ML
WBC # FLD AUTO: 3.46 K/UL

## 2021-05-28 ENCOUNTER — NON-APPOINTMENT (OUTPATIENT)
Age: 86
End: 2021-05-28

## 2021-05-28 NOTE — REASON FOR VISIT
[Follow-Up - Clinic] : a clinic follow-up of [Cardiomyopathy] : cardiomyopathy [Chest Pain] : chest pain [Coronary Artery Disease] : coronary artery disease [Dyspnea] : dyspnea [Symptom and Test Evaluation] : symptom and test evaluation

## 2021-05-28 NOTE — REVIEW OF SYSTEMS
[SOB] : no shortness of breath [Dyspnea on exertion] : not dyspnea during exertion [Chest Discomfort] : chest discomfort [Leg Claudication] : no intermittent leg claudication [Palpitations] : no palpitations [Syncope] : no syncope [Negative] : Heme/Lymph

## 2021-05-28 NOTE — PHYSICAL EXAM
[General Appearance - Well Developed] : well developed [Normal Appearance] : normal appearance [Well Groomed] : well groomed [General Appearance - Well Nourished] : well nourished [No Deformities] : no deformities [General Appearance - In No Acute Distress] : no acute distress [Eyelids - No Xanthelasma] : the eyelids demonstrated no xanthelasmas [Normal Oral Mucosa] : normal oral mucosa [No Oral Pallor] : no oral pallor [No Oral Cyanosis] : no oral cyanosis [Respiration, Rhythm And Depth] : normal respiratory rhythm and effort [Exaggerated Use Of Accessory Muscles For Inspiration] : no accessory muscle use [Auscultation Breath Sounds / Voice Sounds] : lungs were clear to auscultation bilaterally [Heart Rate And Rhythm] : heart rate and rhythm were normal [Heart Sounds] : normal S1 and S2 [Murmurs] : no murmurs present [Arterial Pulses Normal] : the arterial pulses were normal [Edema] : no peripheral edema present [Abdomen Soft] : soft [Abdomen Tenderness] : non-tender [Abdomen Mass (___ Cm)] : no abdominal mass palpated [Abnormal Walk] : normal gait [Gait - Sufficient For Exercise Testing] : the gait was sufficient for exercise testing [Nail Clubbing] : no clubbing of the fingernails [Cyanosis, Localized] : no localized cyanosis [Petechial Hemorrhages (___cm)] : no petechial hemorrhages [] : no ischemic changes [Oriented To Time, Place, And Person] : oriented to person, place, and time [Impaired Insight] : insight and judgment were intact [Affect] : the affect was normal [Mood] : the mood was normal [No Anxiety] : not feeling anxious [Well Developed] : well developed [Well Nourished] : well nourished [No Acute Distress] : no acute distress [Normal Conjunctiva] : normal conjunctiva [Normal Venous Pressure] : normal venous pressure [No Carotid Bruit] : no carotid bruit [Normal S1, S2] : normal S1, S2 [No Murmur] : no murmur [No Rub] : no rub [No Gallop] : no gallop [Clear Lung Fields] : clear lung fields [Good Air Entry] : good air entry [No Respiratory Distress] : no respiratory distress  [Soft] : abdomen soft [Non Tender] : non-tender [No Masses/organomegaly] : no masses/organomegaly [Normal Bowel Sounds] : normal bowel sounds [Normal Gait] : normal gait [No Edema] : no edema [No Cyanosis] : no cyanosis [No Clubbing] : no clubbing [No Varicosities] : no varicosities [No Rash] : no rash [No Skin Lesions] : no skin lesions [Moves all extremities] : moves all extremities [No Focal Deficits] : no focal deficits [Alert and Oriented] : alert and oriented [Normal Speech] : normal speech [Normal memory] : normal memory [FreeTextEntry1] : no JVD or bruits

## 2021-05-28 NOTE — HISTORY OF PRESENT ILLNESS
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, 3V CABG 1996 at [facility], PCI 2019 and 2020, ICD [facility],  AF on coumadin\par cardiac cath 06/2019 patent graft to LAD, mod disease in SVG to OM, D1 s/p ELOY x2\par cardiac cath 06/2020 ELOY to ostial/prox SVG to OM\par Of note anginal symptoms were "feeling exhausted", GARDNER\par \par Today he states that he had 1 episode CP about a mnth ago which he thinks may have been GERD.  Since then he has been feeling well.

## 2021-05-28 NOTE — DISCUSSION/SUMMARY
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, 3V CABG 1996 at [facility], PCI 2019 and 2020, ICD [facility],  AF on coumadin\par \par CAD:\par c/w plavix\par c/w statin and fenofibrate, goal LDL <70\par c/w coreg, lisinopril, imdur\par c/w lasix - pt appears euvolemic\par repeat TTE\par check labs\par \par AF:\par on coumadin without complications\par INR checks with Dr Hart\par \par Pt will return in 6 mnths or sooner as needed but I encouraged communication via phone or portal if necessary.\par The described plan was discussed with the pt.  All questions and concerns were addressed to the best of my knowledge.

## 2021-06-03 ENCOUNTER — NON-APPOINTMENT (OUTPATIENT)
Age: 86
End: 2021-06-03

## 2021-06-04 ENCOUNTER — RX RENEWAL (OUTPATIENT)
Age: 86
End: 2021-06-04

## 2021-06-04 ENCOUNTER — APPOINTMENT (OUTPATIENT)
Dept: FAMILY MEDICINE | Facility: CLINIC | Age: 86
End: 2021-06-04
Payer: MEDICARE

## 2021-06-04 DIAGNOSIS — S41.111A LACERATION W/OUT FOREIGN BODY OF RIGHT UPPER ARM, INITIAL ENCOUNTER: ICD-10-CM

## 2021-06-04 LAB — INR PPP: 2.3 RATIO

## 2021-06-04 PROCEDURE — 85610 PROTHROMBIN TIME: CPT | Mod: QW

## 2021-06-04 PROCEDURE — 99072 ADDL SUPL MATRL&STAF TM PHE: CPT

## 2021-06-04 PROCEDURE — 99214 OFFICE O/P EST MOD 30 MIN: CPT | Mod: 25

## 2021-06-08 ENCOUNTER — RX RENEWAL (OUTPATIENT)
Age: 86
End: 2021-06-08

## 2021-06-13 ENCOUNTER — RX RENEWAL (OUTPATIENT)
Age: 86
End: 2021-06-13

## 2021-06-15 ENCOUNTER — APPOINTMENT (OUTPATIENT)
Dept: DERMATOLOGY | Facility: CLINIC | Age: 86
End: 2021-06-15
Payer: MEDICARE

## 2021-06-15 DIAGNOSIS — L57.0 ACTINIC KERATOSIS: ICD-10-CM

## 2021-06-15 DIAGNOSIS — L28.0 LICHEN SIMPLEX CHRONICUS: ICD-10-CM

## 2021-06-15 DIAGNOSIS — L82.1 OTHER SEBORRHEIC KERATOSIS: ICD-10-CM

## 2021-06-15 PROCEDURE — 17000 DESTRUCT PREMALG LESION: CPT

## 2021-06-15 PROCEDURE — 99213 OFFICE O/P EST LOW 20 MIN: CPT | Mod: 25

## 2021-06-15 PROCEDURE — 99072 ADDL SUPL MATRL&STAF TM PHE: CPT

## 2021-06-15 PROCEDURE — 17003 DESTRUCT PREMALG LES 2-14: CPT

## 2021-06-15 NOTE — ASSESSMENT
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

## 2021-06-15 NOTE — PHYSICAL EXAM
[Alert] : alert [Oriented x 3] : ~L oriented x 3 [Well Nourished] : well nourished [FreeTextEntry3] : The following areas were examined and no significant abnormalities were seen except as noted below:\par \par Type II skin\par \par scalp, face, eyelids, nose, lips, ears, neck, chest, abdomen, back,groin, buttocks, right arm, left arm, right hand, left hand,\par right  leg, left leg, right foot, left foot\par \par Patient is very tan\par \par Upper forehead/anterior hairline: Few thin pink scaly patches, some are ill-defined\par Posterior neck: Ill-defined erythematous scaly lichenified plaque with excoriations\par Right lower chest: 8 mm black rugous plaque\par Lower back area: One small pink scaly papule\par \par There was a suspicious lesion seen

## 2021-06-15 NOTE — HISTORY OF PRESENT ILLNESS
[FreeTextEntry1] : Evaluation of growths [de-identified] : Followup visit for 89-year-old white male last seen by me on May 20, 2019, for evaluation of growths.  Particularly concerned about lesions on the upper forehead and waist.\par No history of skin cancer.

## 2021-06-27 ENCOUNTER — TRANSCRIPTION ENCOUNTER (OUTPATIENT)
Age: 86
End: 2021-06-27

## 2021-07-01 ENCOUNTER — APPOINTMENT (OUTPATIENT)
Dept: OPHTHALMOLOGY | Facility: CLINIC | Age: 86
End: 2021-07-01
Payer: MEDICARE

## 2021-07-01 ENCOUNTER — NON-APPOINTMENT (OUTPATIENT)
Age: 86
End: 2021-07-01

## 2021-07-01 PROCEDURE — 67028 INJECTION EYE DRUG: CPT | Mod: RT

## 2021-07-01 PROCEDURE — 99072 ADDL SUPL MATRL&STAF TM PHE: CPT

## 2021-07-01 PROCEDURE — 92134 CPTRZ OPH DX IMG PST SGM RTA: CPT

## 2021-07-02 ENCOUNTER — APPOINTMENT (OUTPATIENT)
Dept: CARDIOLOGY | Facility: CLINIC | Age: 86
End: 2021-07-02
Payer: MEDICARE

## 2021-07-02 PROCEDURE — 93306 TTE W/DOPPLER COMPLETE: CPT

## 2021-07-02 PROCEDURE — 99072 ADDL SUPL MATRL&STAF TM PHE: CPT

## 2021-07-06 ENCOUNTER — NON-APPOINTMENT (OUTPATIENT)
Age: 86
End: 2021-07-06

## 2021-07-08 ENCOUNTER — RX RENEWAL (OUTPATIENT)
Age: 86
End: 2021-07-08

## 2021-07-12 ENCOUNTER — RX RENEWAL (OUTPATIENT)
Age: 86
End: 2021-07-12

## 2021-07-14 ENCOUNTER — RX RENEWAL (OUTPATIENT)
Age: 86
End: 2021-07-14

## 2021-07-17 NOTE — H&P PST ADULT - ENDOCRINE
Onset: 6/28/21  Location / description: ever since her last period on 6/27/21, she has been having spotting.  She had an appointment on 6/28/21 and received a depo provera shot at that time. She states it was her first injection in about 2 years. Her period was normal, then she had a couple days no bleeding, and then she started spotting. Bleeding has ranged from brown to red, and is currently red. Small amounts just with wiping. At first, she states she was having constant right lower abdominal pain, but now it just happens occasionally. It will be a sharp pain that lasts about 30 minutes before dissipating. Frequently feels light lightheaded and will need to sit down until it passes. Decreased appetite.   Precipitating Factors: as above  Pain Scale (1-10), 10 highest: 6.5/10  Associated Symptoms: as above  What improves/worsens symptoms: nothing/nothing  Symptom specific medications: depo provera  LMP : Patient's last menstrual period was 12/18/2020.  Are you pregnant or breast feeding: no/ not assessed  Recent visits (last 3-4 weeks) for same reason or recent surgery:  6/28/21  Did the patient have a positive coronavirus screening?: No  Date of Covid-19 exposure:  n/a    PLAN:  Home Care Advice provided    Patient/Caller agrees to follow recommendations.    Reason for Disposition  • Using Depo-Provera injections    Protocols used: VAGINAL BLEEDING - [id number]       negative

## 2021-07-17 NOTE — HISTORY OF PRESENT ILLNESS
[FreeTextEntry8] : pt  c/o sob  and  burning sensation in chest  when exposed to cold  air worse when  walikng in cld  stil has  diarrhea took  deficid 
denies pain/discomfort

## 2021-08-10 ENCOUNTER — APPOINTMENT (OUTPATIENT)
Dept: ELECTROPHYSIOLOGY | Facility: CLINIC | Age: 86
End: 2021-08-10

## 2021-08-10 NOTE — REVIEW OF SYSTEMS
Called to advise pt of recent lab results. A1c improved but not at goal. He does not feel like he can reasonably make any more changes to his diet and exercise and is interested in adding another medication to his regimen. Will reach out to Patient Assistance to see what options we might have. Rest of labs stable. Liver enzymes now back to normal. Cholesterol improved. Kidney function improved and electrolytes wnl. Given this do not have a clear reason for his leg cramps. Will have him do a 2 week trial off pravastatin and call us at that time to see how he is feeling. If cramps resolve will switch to a different statin. If no improvement will go back on pravastatin and add ropinirole for possible RLS. All questions answered and pt feels comfortable with the plan of care. He will call with an update in 2 weeks. [Eyeglasses] : currently wearing eyeglasses [Joint Pain] : joint pain [Negative] : Heme/Lymph [Feeling Fatigued] : feeling fatigued [see HPI] : see HPI [Recent Weight Loss (___ Lbs)] : no recent weight loss [Shortness Of Breath] : no shortness of breath [Blurry Vision] : no blurred vision [Chest  Pressure] : no chest pressure [Dyspnea on exertion] : not dyspnea during exertion [Chest Pain] : no chest pain [Lower Ext Edema] : no extremity edema [Palpitations] : no palpitations [Leg Claudication] : no intermittent leg claudication [Muscle Cramps] : no muscle cramps

## 2021-08-12 ENCOUNTER — APPOINTMENT (OUTPATIENT)
Dept: OPHTHALMOLOGY | Facility: CLINIC | Age: 86
End: 2021-08-12

## 2021-08-12 ENCOUNTER — RX RENEWAL (OUTPATIENT)
Age: 86
End: 2021-08-12

## 2021-08-26 ENCOUNTER — NON-APPOINTMENT (OUTPATIENT)
Age: 86
End: 2021-08-26

## 2021-08-26 ENCOUNTER — APPOINTMENT (OUTPATIENT)
Dept: OPHTHALMOLOGY | Facility: CLINIC | Age: 86
End: 2021-08-26
Payer: MEDICARE

## 2021-08-26 PROCEDURE — 92012 INTRM OPH EXAM EST PATIENT: CPT

## 2021-08-26 PROCEDURE — 92134 CPTRZ OPH DX IMG PST SGM RTA: CPT

## 2021-08-31 ENCOUNTER — APPOINTMENT (OUTPATIENT)
Dept: FAMILY MEDICINE | Facility: CLINIC | Age: 86
End: 2021-08-31
Payer: MEDICARE

## 2021-08-31 LAB — INR PPP: 5.7 RATIO

## 2021-08-31 PROCEDURE — 85610 PROTHROMBIN TIME: CPT | Mod: QW

## 2021-08-31 PROCEDURE — 99214 OFFICE O/P EST MOD 30 MIN: CPT | Mod: 25

## 2021-09-01 ENCOUNTER — TRANSCRIPTION ENCOUNTER (OUTPATIENT)
Age: 86
End: 2021-09-01

## 2021-09-01 ENCOUNTER — RX RENEWAL (OUTPATIENT)
Age: 86
End: 2021-09-01

## 2021-09-02 ENCOUNTER — TRANSCRIPTION ENCOUNTER (OUTPATIENT)
Age: 86
End: 2021-09-02

## 2021-09-02 ENCOUNTER — RESULT REVIEW (OUTPATIENT)
Age: 86
End: 2021-09-02

## 2021-09-02 ENCOUNTER — APPOINTMENT (OUTPATIENT)
Dept: ULTRASOUND IMAGING | Facility: CLINIC | Age: 86
End: 2021-09-02
Payer: MEDICARE

## 2021-09-02 ENCOUNTER — APPOINTMENT (OUTPATIENT)
Dept: RADIOLOGY | Facility: CLINIC | Age: 86
End: 2021-09-02
Payer: MEDICARE

## 2021-09-02 ENCOUNTER — APPOINTMENT (OUTPATIENT)
Dept: RADIOLOGY | Facility: CLINIC | Age: 86
End: 2021-09-02

## 2021-09-02 ENCOUNTER — OUTPATIENT (OUTPATIENT)
Dept: OUTPATIENT SERVICES | Facility: HOSPITAL | Age: 86
LOS: 1 days | End: 2021-09-02
Payer: MEDICARE

## 2021-09-02 DIAGNOSIS — Z98.89 OTHER SPECIFIED POSTPROCEDURAL STATES: Chronic | ICD-10-CM

## 2021-09-02 DIAGNOSIS — Z95.1 PRESENCE OF AORTOCORONARY BYPASS GRAFT: Chronic | ICD-10-CM

## 2021-09-02 DIAGNOSIS — Z98.49 CATARACT EXTRACTION STATUS, UNSPECIFIED EYE: Chronic | ICD-10-CM

## 2021-09-02 DIAGNOSIS — S89.92XA UNSPECIFIED INJURY OF LEFT LOWER LEG, INITIAL ENCOUNTER: ICD-10-CM

## 2021-09-02 DIAGNOSIS — S22.39XA FRACTURE OF ONE RIB, UNSPECIFIED SIDE, INITIAL ENCOUNTER FOR CLOSED FRACTURE: Chronic | ICD-10-CM

## 2021-09-02 PROCEDURE — 93971 EXTREMITY STUDY: CPT | Mod: 26,LT

## 2021-09-02 PROCEDURE — 73590 X-RAY EXAM OF LOWER LEG: CPT | Mod: 26,LT

## 2021-09-02 PROCEDURE — 73552 X-RAY EXAM OF FEMUR 2/>: CPT | Mod: 26,LT

## 2021-09-02 PROCEDURE — 73552 X-RAY EXAM OF FEMUR 2/>: CPT

## 2021-09-02 PROCEDURE — 73590 X-RAY EXAM OF LOWER LEG: CPT

## 2021-09-02 PROCEDURE — 93971 EXTREMITY STUDY: CPT

## 2021-09-03 ENCOUNTER — TRANSCRIPTION ENCOUNTER (OUTPATIENT)
Age: 86
End: 2021-09-03

## 2021-09-04 ENCOUNTER — APPOINTMENT (OUTPATIENT)
Dept: FAMILY MEDICINE | Facility: CLINIC | Age: 86
End: 2021-09-04
Payer: MEDICARE

## 2021-09-04 ENCOUNTER — INPATIENT (INPATIENT)
Facility: HOSPITAL | Age: 86
LOS: 5 days | Discharge: HOME | DRG: 603 | End: 2021-09-10
Attending: HOSPITALIST | Admitting: STUDENT IN AN ORGANIZED HEALTH CARE EDUCATION/TRAINING PROGRAM
Payer: MEDICARE

## 2021-09-04 VITALS
OXYGEN SATURATION: 97 % | SYSTOLIC BLOOD PRESSURE: 146 MMHG | HEIGHT: 69 IN | TEMPERATURE: 98 F | WEIGHT: 190 LBS | DIASTOLIC BLOOD PRESSURE: 82 MMHG | BODY MASS INDEX: 28.14 KG/M2 | HEART RATE: 84 BPM

## 2021-09-04 VITALS
WEIGHT: 190.04 LBS | SYSTOLIC BLOOD PRESSURE: 155 MMHG | HEIGHT: 69 IN | HEART RATE: 75 BPM | RESPIRATION RATE: 16 BRPM | TEMPERATURE: 98 F | DIASTOLIC BLOOD PRESSURE: 60 MMHG | OXYGEN SATURATION: 97 %

## 2021-09-04 DIAGNOSIS — Z98.89 OTHER SPECIFIED POSTPROCEDURAL STATES: Chronic | ICD-10-CM

## 2021-09-04 DIAGNOSIS — S89.92XA UNSPECIFIED INJURY OF LEFT LOWER LEG, INITIAL ENCOUNTER: ICD-10-CM

## 2021-09-04 DIAGNOSIS — S22.39XA FRACTURE OF ONE RIB, UNSPECIFIED SIDE, INITIAL ENCOUNTER FOR CLOSED FRACTURE: Chronic | ICD-10-CM
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
## 2021-09-04 DIAGNOSIS — Z98.49 CATARACT EXTRACTION STATUS, UNSPECIFIED EYE: Chronic | ICD-10-CM

## 2021-09-04 DIAGNOSIS — Z95.1 PRESENCE OF AORTOCORONARY BYPASS GRAFT: Chronic | ICD-10-CM

## 2021-09-04 LAB
ALBUMIN SERPL ELPH-MCNC: 4.5 G/DL — SIGNIFICANT CHANGE UP (ref 3.3–5.2)
ALP SERPL-CCNC: 63 U/L — SIGNIFICANT CHANGE UP (ref 40–120)
ALT FLD-CCNC: 16 U/L — SIGNIFICANT CHANGE UP
ANION GAP SERPL CALC-SCNC: 12 MMOL/L — SIGNIFICANT CHANGE UP (ref 5–17)
APTT BLD: 48.9 SEC — HIGH (ref 27.5–35.5)
AST SERPL-CCNC: 27 U/L — SIGNIFICANT CHANGE UP
BASOPHILS # BLD AUTO: 0.03 K/UL — SIGNIFICANT CHANGE UP (ref 0–0.2)
BASOPHILS NFR BLD AUTO: 0.7 % — SIGNIFICANT CHANGE UP (ref 0–2)
BILIRUB SERPL-MCNC: 0.9 MG/DL — SIGNIFICANT CHANGE UP (ref 0.4–2)
BUN SERPL-MCNC: 35.1 MG/DL — HIGH (ref 8–20)
CALCIUM SERPL-MCNC: 10.4 MG/DL — HIGH (ref 8.6–10.2)
CHLORIDE SERPL-SCNC: 104 MMOL/L — SIGNIFICANT CHANGE UP (ref 98–107)
CK SERPL-CCNC: 138 U/L — SIGNIFICANT CHANGE UP (ref 30–200)
CO2 SERPL-SCNC: 23 MMOL/L — SIGNIFICANT CHANGE UP (ref 22–29)
CREAT SERPL-MCNC: 1.49 MG/DL — HIGH (ref 0.5–1.3)
EOSINOPHIL # BLD AUTO: 0.17 K/UL — SIGNIFICANT CHANGE UP (ref 0–0.5)
EOSINOPHIL NFR BLD AUTO: 3.8 % — SIGNIFICANT CHANGE UP (ref 0–6)
GLUCOSE BLDC GLUCOMTR-MCNC: 96 MG/DL — SIGNIFICANT CHANGE UP (ref 70–99)
GLUCOSE SERPL-MCNC: 107 MG/DL — HIGH (ref 70–99)
HCT VFR BLD CALC: 36.4 % — LOW (ref 39–50)
HGB BLD-MCNC: 11.7 G/DL — LOW (ref 13–17)
IMM GRANULOCYTES NFR BLD AUTO: 0.2 % — SIGNIFICANT CHANGE UP (ref 0–1.5)
INR BLD: 1.5 RATIO — HIGH (ref 0.88–1.16)
INR PPP: 1.6 RATIO
LACTATE BLDV-MCNC: 1 MMOL/L — SIGNIFICANT CHANGE UP (ref 0.5–2)
LYMPHOCYTES # BLD AUTO: 0.89 K/UL — LOW (ref 1–3.3)
LYMPHOCYTES # BLD AUTO: 19.7 % — SIGNIFICANT CHANGE UP (ref 13–44)
MCHC RBC-ENTMCNC: 29.9 PG — SIGNIFICANT CHANGE UP (ref 27–34)
MCHC RBC-ENTMCNC: 32.1 GM/DL — SIGNIFICANT CHANGE UP (ref 32–36)
MCV RBC AUTO: 93.1 FL — SIGNIFICANT CHANGE UP (ref 80–100)
MONOCYTES # BLD AUTO: 0.63 K/UL — SIGNIFICANT CHANGE UP (ref 0–0.9)
MONOCYTES NFR BLD AUTO: 13.9 % — SIGNIFICANT CHANGE UP (ref 2–14)
NEUTROPHILS # BLD AUTO: 2.79 K/UL — SIGNIFICANT CHANGE UP (ref 1.8–7.4)
NEUTROPHILS NFR BLD AUTO: 61.7 % — SIGNIFICANT CHANGE UP (ref 43–77)
PLATELET # BLD AUTO: 175 K/UL — SIGNIFICANT CHANGE UP (ref 150–400)
POTASSIUM SERPL-MCNC: 4.8 MMOL/L — SIGNIFICANT CHANGE UP (ref 3.5–5.3)
POTASSIUM SERPL-SCNC: 4.8 MMOL/L — SIGNIFICANT CHANGE UP (ref 3.5–5.3)
PROT SERPL-MCNC: 7.4 G/DL — SIGNIFICANT CHANGE UP (ref 6.6–8.7)
PROTHROM AB SERPL-ACNC: 17.1 SEC — HIGH (ref 10.6–13.6)
RBC # BLD: 3.91 M/UL — LOW (ref 4.2–5.8)
RBC # FLD: 13.9 % — SIGNIFICANT CHANGE UP (ref 10.3–14.5)
SARS-COV-2 RNA SPEC QL NAA+PROBE: SIGNIFICANT CHANGE UP
SODIUM SERPL-SCNC: 139 MMOL/L — SIGNIFICANT CHANGE UP (ref 135–145)
TROPONIN T SERPL-MCNC: <0.01 NG/ML — SIGNIFICANT CHANGE UP (ref 0–0.06)
WBC # BLD: 4.52 K/UL — SIGNIFICANT CHANGE UP (ref 3.8–10.5)
WBC # FLD AUTO: 4.52 K/UL — SIGNIFICANT CHANGE UP (ref 3.8–10.5)

## 2021-09-04 PROCEDURE — 85610 PROTHROMBIN TIME: CPT | Mod: QW

## 2021-09-04 PROCEDURE — 93010 ELECTROCARDIOGRAM REPORT: CPT | Mod: 76

## 2021-09-04 PROCEDURE — 99215 OFFICE O/P EST HI 40 MIN: CPT | Mod: 25

## 2021-09-04 RX ORDER — DEXTROSE 50 % IN WATER 50 %
15 SYRINGE (ML) INTRAVENOUS ONCE
Refills: 0 | Status: DISCONTINUED | OUTPATIENT
Start: 2021-09-04 | End: 2021-09-10

## 2021-09-04 RX ORDER — WARFARIN SODIUM 2.5 MG/1
5 TABLET ORAL ONCE
Refills: 0 | Status: COMPLETED | OUTPATIENT
Start: 2021-09-04 | End: 2021-09-04

## 2021-09-04 RX ORDER — WARFARIN SODIUM 2.5 MG/1
5 TABLET ORAL AT BEDTIME
Refills: 0 | Status: DISCONTINUED | OUTPATIENT
Start: 2021-09-04 | End: 2021-09-04

## 2021-09-04 RX ORDER — TRAMADOL HYDROCHLORIDE 50 MG/1
1 TABLET ORAL
Qty: 0 | Refills: 0 | DISCHARGE

## 2021-09-04 RX ORDER — TIZANIDINE 4 MG/1
1 TABLET ORAL
Qty: 0 | Refills: 0 | DISCHARGE

## 2021-09-04 RX ORDER — DEXTROSE 50 % IN WATER 50 %
25 SYRINGE (ML) INTRAVENOUS ONCE
Refills: 0 | Status: DISCONTINUED | OUTPATIENT
Start: 2021-09-04 | End: 2021-09-10

## 2021-09-04 RX ORDER — FUROSEMIDE 40 MG
20 TABLET ORAL DAILY
Refills: 0 | Status: DISCONTINUED | OUTPATIENT
Start: 2021-09-04 | End: 2021-09-07

## 2021-09-04 RX ORDER — FENOFIBRATE,MICRONIZED 130 MG
145 CAPSULE ORAL DAILY
Refills: 0 | Status: DISCONTINUED | OUTPATIENT
Start: 2021-09-04 | End: 2021-09-10

## 2021-09-04 RX ORDER — GLUCAGON INJECTION, SOLUTION 0.5 MG/.1ML
1 INJECTION, SOLUTION SUBCUTANEOUS ONCE
Refills: 0 | Status: DISCONTINUED | OUTPATIENT
Start: 2021-09-04 | End: 2021-09-10

## 2021-09-04 RX ORDER — SODIUM CHLORIDE 9 MG/ML
1000 INJECTION, SOLUTION INTRAVENOUS
Refills: 0 | Status: DISCONTINUED | OUTPATIENT
Start: 2021-09-04 | End: 2021-09-10

## 2021-09-04 RX ORDER — QUETIAPINE FUMARATE 200 MG/1
12.5 TABLET, FILM COATED ORAL AT BEDTIME
Refills: 0 | Status: DISCONTINUED | OUTPATIENT
Start: 2021-09-04 | End: 2021-09-10

## 2021-09-04 RX ORDER — FOLIC ACID 0.8 MG
1 TABLET ORAL DAILY
Refills: 0 | Status: DISCONTINUED | OUTPATIENT
Start: 2021-09-04 | End: 2021-09-10

## 2021-09-04 RX ORDER — ATORVASTATIN CALCIUM 80 MG/1
1 TABLET, FILM COATED ORAL
Qty: 0 | Refills: 0 | DISCHARGE

## 2021-09-04 RX ORDER — METHYLPREDNISOLONE 4 MG
500 TABLET ORAL DAILY
Refills: 0 | Status: DISCONTINUED | OUTPATIENT
Start: 2021-09-04 | End: 2021-09-04

## 2021-09-04 RX ORDER — QUETIAPINE FUMARATE 200 MG/1
0.5 TABLET, FILM COATED ORAL
Qty: 0 | Refills: 0 | DISCHARGE

## 2021-09-04 RX ORDER — LISINOPRIL 2.5 MG/1
2.5 TABLET ORAL DAILY
Refills: 0 | Status: DISCONTINUED | OUTPATIENT
Start: 2021-09-04 | End: 2021-09-07

## 2021-09-04 RX ORDER — ACETAMINOPHEN 500 MG
650 TABLET ORAL ONCE
Refills: 0 | Status: COMPLETED | OUTPATIENT
Start: 2021-09-04 | End: 2021-09-04

## 2021-09-04 RX ORDER — INSULIN LISPRO 100/ML
VIAL (ML) SUBCUTANEOUS
Refills: 0 | Status: DISCONTINUED | OUTPATIENT
Start: 2021-09-04 | End: 2021-09-10

## 2021-09-04 RX ORDER — MAGNESIUM OXIDE 400 MG ORAL TABLET 241.3 MG
400 TABLET ORAL DAILY
Refills: 0 | Status: DISCONTINUED | OUTPATIENT
Start: 2021-09-04 | End: 2021-09-10

## 2021-09-04 RX ORDER — ATORVASTATIN CALCIUM 80 MG/1
40 TABLET, FILM COATED ORAL AT BEDTIME
Refills: 0 | Status: DISCONTINUED | OUTPATIENT
Start: 2021-09-04 | End: 2021-09-10

## 2021-09-04 RX ORDER — CLOPIDOGREL BISULFATE 75 MG/1
75 TABLET, FILM COATED ORAL DAILY
Refills: 0 | Status: DISCONTINUED | OUTPATIENT
Start: 2021-09-04 | End: 2021-09-10

## 2021-09-04 RX ORDER — ALLOPURINOL 300 MG
300 TABLET ORAL DAILY
Refills: 0 | Status: DISCONTINUED | OUTPATIENT
Start: 2021-09-04 | End: 2021-09-10

## 2021-09-04 RX ORDER — SODIUM CHLORIDE 9 MG/ML
1000 INJECTION INTRAMUSCULAR; INTRAVENOUS; SUBCUTANEOUS ONCE
Refills: 0 | Status: COMPLETED | OUTPATIENT
Start: 2021-09-04 | End: 2021-09-04

## 2021-09-04 RX ORDER — METOPROLOL TARTRATE 50 MG
25 TABLET ORAL DAILY
Refills: 0 | Status: DISCONTINUED | OUTPATIENT
Start: 2021-09-04 | End: 2021-09-04

## 2021-09-04 RX ORDER — ACETAMINOPHEN 500 MG
650 TABLET ORAL EVERY 6 HOURS
Refills: 0 | Status: DISCONTINUED | OUTPATIENT
Start: 2021-09-04 | End: 2021-09-10

## 2021-09-04 RX ORDER — DEXTROSE 50 % IN WATER 50 %
12.5 SYRINGE (ML) INTRAVENOUS ONCE
Refills: 0 | Status: DISCONTINUED | OUTPATIENT
Start: 2021-09-04 | End: 2021-09-10

## 2021-09-04 RX ORDER — CARVEDILOL PHOSPHATE 80 MG/1
12.5 CAPSULE, EXTENDED RELEASE ORAL EVERY 12 HOURS
Refills: 0 | Status: DISCONTINUED | OUTPATIENT
Start: 2021-09-04 | End: 2021-09-10

## 2021-09-04 RX ORDER — CARVEDILOL PHOSPHATE 80 MG/1
1 CAPSULE, EXTENDED RELEASE ORAL
Qty: 0 | Refills: 0 | DISCHARGE

## 2021-09-04 RX ADMIN — WARFARIN SODIUM 5 MILLIGRAM(S): 2.5 TABLET ORAL at 22:35

## 2021-09-04 RX ADMIN — Medication 100 MILLIGRAM(S): at 13:34

## 2021-09-04 RX ADMIN — SODIUM CHLORIDE 1000 MILLILITER(S): 9 INJECTION INTRAMUSCULAR; INTRAVENOUS; SUBCUTANEOUS at 13:15

## 2021-09-04 RX ADMIN — SODIUM CHLORIDE 1000 MILLILITER(S): 9 INJECTION INTRAMUSCULAR; INTRAVENOUS; SUBCUTANEOUS at 14:38

## 2021-09-04 RX ADMIN — Medication 300 MILLIGRAM(S): at 16:11

## 2021-09-04 RX ADMIN — CARVEDILOL PHOSPHATE 12.5 MILLIGRAM(S): 80 CAPSULE, EXTENDED RELEASE ORAL at 16:09

## 2021-09-04 RX ADMIN — Medication 650 MILLIGRAM(S): at 13:15

## 2021-09-04 RX ADMIN — QUETIAPINE FUMARATE 12.5 MILLIGRAM(S): 200 TABLET, FILM COATED ORAL at 22:35

## 2021-09-04 RX ADMIN — Medication 145 MILLIGRAM(S): at 16:11

## 2021-09-04 RX ADMIN — Medication 100 MILLIGRAM(S): at 22:35

## 2021-09-04 RX ADMIN — ATORVASTATIN CALCIUM 40 MILLIGRAM(S): 80 TABLET, FILM COATED ORAL at 22:35

## 2021-09-04 RX ADMIN — MAGNESIUM OXIDE 400 MG ORAL TABLET 400 MILLIGRAM(S): 241.3 TABLET ORAL at 16:11

## 2021-09-04 RX ADMIN — Medication 650 MILLIGRAM(S): at 20:26

## 2021-09-04 NOTE — ED CDU PROVIDER INITIAL DAY NOTE - PHYSICAL EXAMINATION
Constitutional: Awake and alert, in NAD  Eyes: clear bilaterally  Cardiac: S1S2, no murmur  Resp: CTA/BL, no use of accessory muscles  GI: +BS x 4, soft, NTTP  MSK:  no bony deformity, no limited ROM  Neuro: A&Ox3, CN II-XI GI, VILLEGAS x4, 5/5 motors throughout, = sensation  Skin: extensive bruising to left inner thigh and lower leg and foot not involving toes with swelling, abrasion to shin area, redness and heat
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
## 2021-09-04 NOTE — ED ADULT NURSE REASSESSMENT NOTE - NS ED NURSE REASSESS COMMENT FT1
Pt awake and alert x4, no respiratory distress, VSS afebrile. Left leg elevated on multiple pillows. Left leg ecchymosis note with swelling. Ace bandage in place. Pt resting comfortably at this time.

## 2021-09-04 NOTE — REVIEW OF SYSTEMS
[Joint Stiffness] : joint stiffness [Muscle Pain] : muscle pain [Joint Swelling] : joint swelling [Itching] : itching [Skin Rash] : skin rash [Negative] : Constitutional

## 2021-09-04 NOTE — PHYSICAL EXAM
[Normal] : normal rate, regular rhythm, normal S1 and S2 and no murmur heard [de-identified] : has decreased sensation compared to right leg [de-identified] : unable to fully unbandage leg due to pain -> red, swelling, pain out of proption of injury, swelling spreading from left mid foot to left mid thigh

## 2021-09-04 NOTE — ED PROVIDER NOTE - PROGRESS NOTE DETAILS
Fawn: labs grossly wnl, no rhabdo, had neg imaging, discussed hematoma/possible mild cellulitis, discussed d/c with po vs iv abx, pt/family feel more comfortable with 1 day iv abx. placed in obs.

## 2021-09-04 NOTE — ED PEDIATRIC NURSE REASSESSMENT NOTE - NS ED NURSE REASSESS COMMENT FT2
Assumed care of the patient @1600. Pt A&Ox4, VSS afebrile. Left leg ecchymosis and swelling noted. Leg elevated on multiple pillows. Ace bandage in place. Patient in understanding of plan of care. Patient with no further questions for the RN. Resting in comfort. Call bell within reach and encouraged to use when assistance needed. Will continue to monitor.

## 2021-09-04 NOTE — ED CDU PROVIDER INITIAL DAY NOTE - NSICDXPASTMEDICALHX_GEN_ALL_CORE_FT
PAST MEDICAL HISTORY:  AICD (automatic cardioverter/defibrillator) present     CAD (coronary artery disease)     Cardiomyopathy, ischemic     CRI (chronic renal insufficiency)     DM (diabetes mellitus) diet controlled per pt    HLD (hyperlipidemia)     HTN (hypertension)     Low back pain chronic, receiving epidurals    PAF (paroxysmal atrial fibrillation)     TIA (transient ischemic attack)      PAST MEDICAL HISTORY:  AICD (automatic cardioverter/defibrillator) present     CAD (coronary artery disease)     Cardiomyopathy, ischemic     CRI (chronic renal insufficiency)     DM (diabetes mellitus) diet controlled per pt    History of liver failure     HLD (hyperlipidemia)     HTN (hypertension)     Low back pain chronic, receiving epidurals    Opioid abuse     PAF (paroxysmal atrial fibrillation)     TIA (transient ischemic attack)

## 2021-09-04 NOTE — ASSESSMENT
[FreeTextEntry1] : LEft leg injury- high risk for compartment syndrome vs nec fasc vs crush injury\par will send pt to ER ASAP\par Spoke with son Tenzin- will take pt to [facility], declined ambulance \par VSS in office\par \par INR 1.6 at visit today, hold coumadin until further eval from ER

## 2021-09-04 NOTE — ED ADULT TRIAGE NOTE - CHIEF COMPLAINT QUOTE
Patient A&Ox4 complaining of pain to LLE, family stated has crush injury to extremity & is being treated by primary PCP. Has swelling & cellulitis to extremity as per family. Has dressing in place.
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
## 2021-09-04 NOTE — ED CDU PROVIDER INITIAL DAY NOTE - ATTENDING CONTRIBUTION TO CARE
seen wth acp  pt to  be on observation for cellulitis LLE  agree with iv ab and reassess  will continue reg medical regimen  agree with plan

## 2021-09-04 NOTE — ED CDU PROVIDER INITIAL DAY NOTE - NS ED ROS FT
General: no fever or chills  Eyes: no redness, discharge,  no visual disturbances  ENT: no nasal congestion, sore throat  Cardiac: No chest pain, palpitations, peripheral edema  Respiratory: No cough, GARDNER, SOB or wheeze  GI: No abdominal pain, N/V/D  : no dysuria, hematuria  Musculoskeletal: left leg pain pain  Neuro: No headache or dizziness  Skin: abrasions and bruising

## 2021-09-04 NOTE — ED PROVIDER NOTE - PHYSICAL EXAMINATION
Gen: No acute distress, non toxic  HEENT: Mucous membranes moist, pink conjunctivae, EOMI  CV: RRR, nl s1/s2.  Resp: CTAB, normal rate and effort  GI: Abdomen soft, NT, ND. No rebound, no guarding  : No CVAT  Neuro: A&O x 3, moving all 4 extremities  MSK: left LE with faint tracking ecchymosis from inner thigh down to calf, shin with superficial dried abrasion, generalized warmth around area with mild swelling, soft compressible compartments, ttp to area, dark discoloration with some erythema/ecchymosis. neurovascularly intact distally. superficial abrasion under knee. other extremities wnl.   Skin: No rashes. intact and perfused.
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
## 2021-09-04 NOTE — ED ADULT NURSE REASSESSMENT NOTE - NS ED NURSE REASSESS COMMENT FT1
Assumed care of pt from previous RN.  patient A&O resting in stretcher, left leg elevated on pillows.  Patient c/o pain 9/10 to left leg.  Patient to be given medication for pain.  patient updated on plan of care by PA.  urinal placed at bedside.  Safety maintained.

## 2021-09-04 NOTE — ED PROVIDER NOTE - CLINICAL SUMMARY MEDICAL DECISION MAKING FREE TEXT BOX
crush injury to left shin with neg xray/doppler, neurovascularly intact. ecchymotic/mild erythema with warmth and swelling, soft compartments without any neuro findings. pain not significantly out of proportion at this time. on coumadin. will check inr, labs, cpk, treat possible clinda, reassess
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
## 2021-09-04 NOTE — ED ADULT TRIAGE NOTE - DIRECT TO ROOM CARE INITIATED:
History & Physical       Patient Name: Tiffany Thomas Location: ASC   MRN: [id number]    : 1961    Date of Surgery: 10/18/2019      Physician: Angel Del Angel MD                     HISTORY:  Pre-op Diagnosis: heme positive stools  Proposed Surgery: colonoscopy    HPI:  Tiffany Thomas is a 58 year old female heme positive stool     Past Medical History:   Diagnosis Date   • H/O breast surgery     infection 2013- right   • Hyperlipidemia    • Hypothyroidism    • Prediabetes       Patient Active Problem List   Diagnosis   • Type 2 diabetes mellitus without complications (CMS/HCC)   • Hypothyroid   • Screening examination for pulmonary tuberculosis     Current Outpatient Medications   Medication Sig   • albuterol (VENTOLIN) (2.5 MG/3ML) 0.083% nebulizer solution 1 treatment every 4 hours for the next 7-10 days then wean as tolerated   • albuterol 108 (90 Base) MCG/ACT inhaler Take 2 inhalations every 4 hours for the next 7-10 days then wean as tolerated.   • levothyroxine (SYNTHROID, LEVOTHROID) 125 MCG tablet Take 1 tablet by mouth daily.   • Blood Pressure Kit Please check blood pressure 1-2 times a week.   • calcipotriene (DOVONEX) 0.005 % cream Apply to psoriasis of face and extremities twice daily as directed   • clobetasol (TEMOVATE) 0.05 % ointment Apply 2 times daily to affected areas as needed     No current facility-administered medications for this visit.      ALLERGIES:  No Known Allergies     Social History:  Social History     Socioeconomic History   • Marital status: /Civil Union     Spouse name: Not on file   • Number of children: Not on file   • Years of education: Not on file   • Highest education level: Not on file   Occupational History   • Not on file   Social Needs   • Financial resource strain: Not hard at all   • Food insecurity:     Worry: Patient refused     Inability: Patient refused   • Transportation needs:     Medical: Patient refused     Non-medical: Patient refused    Tobacco Use   • Smoking status: Never Smoker   • Smokeless tobacco: Never Used   Substance and Sexual Activity   • Alcohol use: Yes     Frequency: Never     Comment: occ   • Drug use: No   • Sexual activity: Not on file   Lifestyle   • Physical activity:     Days per week: 2 days     Minutes per session: 30 min   • Stress: Only a little   Relationships   • Social connections:     Talks on phone: Patient refused     Gets together: Patient refused     Attends Denominational service: Patient refused     Active member of club or organization: Patient refused     Attends meetings of clubs or organizations: Patient refused     Relationship status: Patient refused   • Intimate partner violence:     Fear of current or ex partner: Patient refused     Emotionally abused: Patient refused     Physically abused: Patient refused     Forced sexual activity: Patient refused   Other Topics Concern   • Not on file   Social History Narrative   • Not on file       PERTINENT REVIEW OF SYSTEMS:   No contributory complaints    PHYSICAL EXAMINATION:  Vitals Signs Stable Yes    Weight     Mental Status: Awake & alert, O x 3 Yes   HEENT: No Gross lesion noted Yes    Pupils round & equal Yes    No icterus Yes   Heart: Regular rate & rhythm Yes   Lungs: Clear to auscultation Yes   Abdomen: Soft and non-tender Yes   Extremities: No clubbing, cyanosis or edema Yes     Other:         Tiffany Thomas is cleared for surgery in the ambulatory setting.       Electronically signed by  Angel Del Angel MD         04-Sep-2021 12:08

## 2021-09-05 DIAGNOSIS — L03.90 CELLULITIS, UNSPECIFIED: ICD-10-CM

## 2021-09-05 LAB
APTT BLD: 37.2 SEC — HIGH (ref 27.5–35.5)
GLUCOSE BLDC GLUCOMTR-MCNC: 111 MG/DL — HIGH (ref 70–99)
GLUCOSE BLDC GLUCOMTR-MCNC: 116 MG/DL — HIGH (ref 70–99)
GLUCOSE BLDC GLUCOMTR-MCNC: 119 MG/DL — HIGH (ref 70–99)
GLUCOSE BLDC GLUCOMTR-MCNC: 135 MG/DL — HIGH (ref 70–99)
INR BLD: 1.5 RATIO — HIGH (ref 0.88–1.16)
PROTHROM AB SERPL-ACNC: 17.1 SEC — HIGH (ref 10.6–13.6)

## 2021-09-05 PROCEDURE — 99233 SBSQ HOSP IP/OBS HIGH 50: CPT

## 2021-09-05 PROCEDURE — 99223 1ST HOSP IP/OBS HIGH 75: CPT

## 2021-09-05 PROCEDURE — 93971 EXTREMITY STUDY: CPT | Mod: 26,LT

## 2021-09-05 RX ORDER — LANOLIN ALCOHOL/MO/W.PET/CERES
3 CREAM (GRAM) TOPICAL AT BEDTIME
Refills: 0 | Status: DISCONTINUED | OUTPATIENT
Start: 2021-09-05 | End: 2021-09-10

## 2021-09-05 RX ORDER — SACCHAROMYCES BOULARDII 250 MG
250 POWDER IN PACKET (EA) ORAL
Refills: 0 | Status: DISCONTINUED | OUTPATIENT
Start: 2021-09-05 | End: 2021-09-10

## 2021-09-05 RX ORDER — INFLUENZA VIRUS VACCINE 15; 15; 15; 15 UG/.5ML; UG/.5ML; UG/.5ML; UG/.5ML
0.5 SUSPENSION INTRAMUSCULAR ONCE
Refills: 0 | Status: COMPLETED | OUTPATIENT
Start: 2021-09-05 | End: 2021-09-05

## 2021-09-05 RX ORDER — TRAMADOL HYDROCHLORIDE 50 MG/1
50 TABLET ORAL THREE TIMES A DAY
Refills: 0 | Status: DISCONTINUED | OUTPATIENT
Start: 2021-09-05 | End: 2021-09-07

## 2021-09-05 RX ORDER — INSULIN LISPRO 100/ML
VIAL (ML) SUBCUTANEOUS
Refills: 0 | Status: DISCONTINUED | OUTPATIENT
Start: 2021-09-05 | End: 2021-09-06

## 2021-09-05 RX ORDER — CEFAZOLIN SODIUM 1 G
2000 VIAL (EA) INJECTION EVERY 8 HOURS
Refills: 0 | Status: DISCONTINUED | OUTPATIENT
Start: 2021-09-05 | End: 2021-09-09

## 2021-09-05 RX ORDER — ISOSORBIDE MONONITRATE 60 MG/1
1 TABLET, EXTENDED RELEASE ORAL
Qty: 0 | Refills: 0 | DISCHARGE

## 2021-09-05 RX ADMIN — CARVEDILOL PHOSPHATE 12.5 MILLIGRAM(S): 80 CAPSULE, EXTENDED RELEASE ORAL at 05:46

## 2021-09-05 RX ADMIN — Medication 300 MILLIGRAM(S): at 11:49

## 2021-09-05 RX ADMIN — TRAMADOL HYDROCHLORIDE 50 MILLIGRAM(S): 50 TABLET ORAL at 22:10

## 2021-09-05 RX ADMIN — Medication 145 MILLIGRAM(S): at 11:49

## 2021-09-05 RX ADMIN — Medication 100 MILLIGRAM(S): at 05:47

## 2021-09-05 RX ADMIN — CLOPIDOGREL BISULFATE 75 MILLIGRAM(S): 75 TABLET, FILM COATED ORAL at 11:49

## 2021-09-05 RX ADMIN — Medication 20 MILLIGRAM(S): at 05:46

## 2021-09-05 RX ADMIN — Medication 100 MILLIGRAM(S): at 14:38

## 2021-09-05 RX ADMIN — Medication 250 MILLIGRAM(S): at 17:13

## 2021-09-05 RX ADMIN — CARVEDILOL PHOSPHATE 12.5 MILLIGRAM(S): 80 CAPSULE, EXTENDED RELEASE ORAL at 17:13

## 2021-09-05 RX ADMIN — Medication 650 MILLIGRAM(S): at 11:30

## 2021-09-05 RX ADMIN — Medication 1 MILLIGRAM(S): at 11:49

## 2021-09-05 RX ADMIN — TRAMADOL HYDROCHLORIDE 50 MILLIGRAM(S): 50 TABLET ORAL at 21:40

## 2021-09-05 RX ADMIN — ATORVASTATIN CALCIUM 40 MILLIGRAM(S): 80 TABLET, FILM COATED ORAL at 21:40

## 2021-09-05 RX ADMIN — Medication 100 MILLIGRAM(S): at 21:42

## 2021-09-05 RX ADMIN — LISINOPRIL 2.5 MILLIGRAM(S): 2.5 TABLET ORAL at 05:46

## 2021-09-05 RX ADMIN — MAGNESIUM OXIDE 400 MG ORAL TABLET 400 MILLIGRAM(S): 241.3 TABLET ORAL at 11:49

## 2021-09-05 RX ADMIN — QUETIAPINE FUMARATE 12.5 MILLIGRAM(S): 200 TABLET, FILM COATED ORAL at 21:42

## 2021-09-05 RX ADMIN — Medication 650 MILLIGRAM(S): at 08:53

## 2021-09-05 RX ADMIN — Medication 650 MILLIGRAM(S): at 02:41

## 2021-09-05 NOTE — PHYSICAL THERAPY INITIAL EVALUATION ADULT - GENERAL OBSERVATIONS, REHAB EVAL
Pt received in stretcher, + IV Loc, breathing on RA in NAD, in 10/10 left LE pain, agreeable to PT evaluation
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
## 2021-09-05 NOTE — ED CDU PROVIDER SUBSEQUENT DAY NOTE - PHYSICAL EXAMINATION
Constitutional: Awake and alert, in NAD  Eyes: clear bilaterally  Cardiac: S1S2, no murmur  Resp: CTA/BL, no use of accessory muscles  GI: +BS x 4, soft, NTTP  MSK:  no bony deformity, no limited ROM  Skin: extensive bruising to left inner thigh and lower leg and foot not involving toes with swelling, abrasion to shin area, redness and heat

## 2021-09-05 NOTE — ED CDU PROVIDER SUBSEQUENT DAY NOTE - NSICDXPASTMEDICALHX_GEN_ALL_CORE_FT
PAST MEDICAL HISTORY:  AICD (automatic cardioverter/defibrillator) present     CAD (coronary artery disease)     Cardiomyopathy, ischemic     CRI (chronic renal insufficiency)     DM (diabetes mellitus) diet controlled per pt    History of liver failure     HLD (hyperlipidemia)     HTN (hypertension)     Low back pain chronic, receiving epidurals    Opioid abuse     PAF (paroxysmal atrial fibrillation)     TIA (transient ischemic attack)

## 2021-09-05 NOTE — ED ADULT NURSE REASSESSMENT NOTE - COMFORT CARE
meal provided/plan of care explained/po fluids offered/repositioned/wait time explained
plan of care explained/po fluids offered/side rails up/wait time explained
LLE +erythema, +abrasion, +swelling, +pain/meal provided/plan of care explained/po fluids offered/repositioned/wait time explained
assisted to bedside commode/meal provided/plan of care explained/po fluids offered/repositioned/wait time explained

## 2021-09-05 NOTE — ED ADULT NURSE REASSESSMENT NOTE - GENERAL PATIENT STATE
comfortable appearance
comfortable appearance/cooperative
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
## 2021-09-05 NOTE — H&P ADULT - NSHPPHYSICALEXAM_GEN_ALL_CORE
GENERAL: pt examined bedside, laying comfortably in bed in NAD  HEENT: NC/AT, moist oral mucosa, clear conjunctiva, sclera nonicteric  RESPIRATORY: Normal respiratory effort; CTA b/l, no wheezing, rhonchi, rales  CARDIOVASCULAR: RRR, normal S1 and S2, no murmur/rub/gallop  ABDOMEN: soft, NT/ND, normoactive bowel sounds, no rebound/guarding  MSK: No joint deformities, edema, erythema  EXTREMITIES: No cynaosis, no clubbing, no lower extremity edema; Peripheral pulses are 2+ bilaterally  PSYCH: affect appropriate and cooperative  NEUROLOGY: A+O to person, place, and time, no focal neurologic deficits appreciated   SKIN: No rashes or no palpable lesions
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
## 2021-09-05 NOTE — PROVIDER CONTACT NOTE (OTHER) - RECOMMENDATIONS
D/C Recommendations: IVORY, pt unable to stand/ambulate at this time, has no assist at home. D/C Recommendation may change pending progress & pt's pain control

## 2021-09-05 NOTE — PHYSICAL THERAPY INITIAL EVALUATION ADULT - DISCHARGE DISPOSITION, PT EVAL
IVORY; pt unable to tolerate standing/ambulating at this time. D/C Recommendations may change pending progress with therapy and pain control.

## 2021-09-05 NOTE — H&P ADULT - NSHPREVIEWOFSYSTEMS_GEN_ALL_CORE
CONSTITUTIONAL: no fevers, chills, night sweats, weight changes  HEENT: no vision changes or diplopia, no tinnitus, no sore throat  RESPIRATORY: denies dyspnea, sob, nocturnal cough, sputum or hemoptysis  CARDIOVASCULAR: no CP, palpitations or lower extremity edema  GI: no dysphagia, nausea, abd pain, constipation, diarrhea, stool change or blood in stool  : no dysuria or hematuria, no flank pain, no incontinence or urinary retention  MSK: no joint pain or swelling  INTEGUMENTARY: left leg bruising and swelling  NEUROLOGICAL: no HA, confusion, syncope, numbness, weakness, tremors or ataxia  PSYCH: denies depression  ENDOCRINE: no polyuria, polydipsia, no temp intolerance, no tremors, no changes in skin, hair or nails  HEMATOLOGIC/LYMPHATIC: no lymph node enlargement, abnormal bruising or bleeding

## 2021-09-05 NOTE — PHYSICAL THERAPY INITIAL EVALUATION ADULT - LEVEL OF INDEPENDENCE: SUPINE/SIT, REHAB EVAL
pt with c/o of severe pain transitioning from supine to sit, unable to tolerate sitting position for longer than 5 seconds. Pt immediately returning to supine position despite max encouragement/support/education./independent

## 2021-09-05 NOTE — H&P ADULT - NSHPLABSRESULTS_GEN_ALL_CORE
11.7   4.52  )-----------( 175      ( 04 Sep 2021 13:11 )             36.4       09-04    139  |  104  |  35.1<H>  ----------------------------<  107<H>  4.8   |  23.0  |  1.49<H>    Ca    10.4<H>      04 Sep 2021 13:11    TPro  7.4  /  Alb  4.5  /  TBili  0.9  /  DBili  x   /  AST  27  /  ALT  16  /  AlkPhos  63  09-04
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
## 2021-09-05 NOTE — ED ADULT NURSE REASSESSMENT NOTE - NS ED NURSE REASSESS COMMENT FT1
Assumed care of the patient at 0730. Verbal report received from Tiffany COLEMAN ED. Patient A&Ox4. No s/s of acute distress. RLE +abrasion, surrounding erythema, +pain. Right inner thigh ecchymotic. Patient pending IV abx and re-eval. Patient in understanding of plan of care. Patient with no further questions for the RN. Resting in comfort. Call bell within reach and encouraged to use when assistance needed. Will continue to monitor. Assumed care of the patient at 0730. Verbal report received from Tiffany COLEMAN ED. Patient A&Ox4. No s/s of acute distress. LLE +abrasion, surrounding erythema, +pain. Left inner thigh ecchymotic. Patient pending IV abx and re-eval. Patient in understanding of plan of care. Patient with no further questions for the RN. Resting in comfort. Call bell within reach and encouraged to use when assistance needed. Will continue to monitor.

## 2021-09-05 NOTE — PROVIDER CONTACT NOTE (OTHER) - ASSESSMENT
Pt independent with bed mobility, however unable to tolerate mobility c/o of severe pain. Pt unwilling to attempt standing stating "I can't do it I won't be able to tolerate that, trust me if I could I would".

## 2021-09-05 NOTE — PHYSICAL THERAPY INITIAL EVALUATION ADULT - ADDITIONAL COMMENTS
Pt reports living in private home, alone, with no assist available. Pt reports having no stairs to enter/inside. Pt independent prior to admission. Has been using [facility] since incident occurred on monday to assist with ambulating. Owns no other DME. Pt drives & is retired.

## 2021-09-05 NOTE — PROVIDER CONTACT NOTE (OTHER) - SITUATION
chart reviewed, contents noted, orders received. PT eval completed & documented see note for details. Pt with 10/10 pre/during/post eval at left lower LE. Pt received & returned to stretcher in NAD.

## 2021-09-05 NOTE — ED ADULT NURSE NOTE - NSICDXPASTMEDICALHX_GEN_ALL_CORE_FT
Severely impaired: cannot locate objects without hearing or touching them or patient nonresponsive.
PAST MEDICAL HISTORY:  AICD (automatic cardioverter/defibrillator) present     CAD (coronary artery disease)     Cardiomyopathy, ischemic     CRI (chronic renal insufficiency)     DM (diabetes mellitus) diet controlled per pt    History of liver failure     HLD (hyperlipidemia)     HTN (hypertension)     Low back pain chronic, receiving epidurals    Opioid abuse     PAF (paroxysmal atrial fibrillation)     TIA (transient ischemic attack)

## 2021-09-05 NOTE — ED CDU PROVIDER SUBSEQUENT DAY NOTE - ATTENDING CONTRIBUTION TO CARE
pt with cellulitis LLE, in obs for iv AB  compared to yesterday, not improved  pt co inability to walk bc of pain but will not give narcs based on hx substance abuse  will recheck doppler to assure no dvt and consult ID for further AB therapy

## 2021-09-05 NOTE — ED CDU PROVIDER SUBSEQUENT DAY NOTE - MEDICAL DECISION MAKING DETAILS
Pt is a 88 y/o M s/p crush injury to left leg on Monday.  Will give clindamycin q8h for cellulitis, PT evaluation to assess ambulation as patient states he is having difficulty ambulating.  As per son patine has hx of opioid abuse as recent as 2017, NO NARCOTICS, tylenol for pain, elevate above the level of the heart

## 2021-09-06 ENCOUNTER — TRANSCRIPTION ENCOUNTER (OUTPATIENT)
Age: 86
End: 2021-09-06

## 2021-09-06 LAB
A1C WITH ESTIMATED AVERAGE GLUCOSE RESULT: 5.2 % — SIGNIFICANT CHANGE UP (ref 4–5.6)
ANION GAP SERPL CALC-SCNC: 13 MMOL/L — SIGNIFICANT CHANGE UP (ref 5–17)
APTT BLD: 39.1 SEC — HIGH (ref 27.5–35.5)
BUN SERPL-MCNC: 25.2 MG/DL — HIGH (ref 8–20)
CALCIUM SERPL-MCNC: 9.7 MG/DL — SIGNIFICANT CHANGE UP (ref 8.6–10.2)
CHLORIDE SERPL-SCNC: 106 MMOL/L — SIGNIFICANT CHANGE UP (ref 98–107)
CO2 SERPL-SCNC: 20 MMOL/L — LOW (ref 22–29)
COVID-19 SPIKE DOMAIN AB INTERP: POSITIVE
COVID-19 SPIKE DOMAIN ANTIBODY RESULT: 226 U/ML — HIGH
CREAT SERPL-MCNC: 1.21 MG/DL — SIGNIFICANT CHANGE UP (ref 0.5–1.3)
ESTIMATED AVERAGE GLUCOSE: 103 MG/DL — SIGNIFICANT CHANGE UP (ref 68–114)
GLUCOSE BLDC GLUCOMTR-MCNC: 116 MG/DL — HIGH (ref 70–99)
GLUCOSE BLDC GLUCOMTR-MCNC: 126 MG/DL — HIGH (ref 70–99)
GLUCOSE BLDC GLUCOMTR-MCNC: 136 MG/DL — HIGH (ref 70–99)
GLUCOSE BLDC GLUCOMTR-MCNC: 142 MG/DL — HIGH (ref 70–99)
GLUCOSE SERPL-MCNC: 117 MG/DL — HIGH (ref 70–99)
HCT VFR BLD CALC: 33.3 % — LOW (ref 39–50)
HGB BLD-MCNC: 10.9 G/DL — LOW (ref 13–17)
INR BLD: 1.65 RATIO — HIGH (ref 0.88–1.16)
MCHC RBC-ENTMCNC: 29.6 PG — SIGNIFICANT CHANGE UP (ref 27–34)
MCHC RBC-ENTMCNC: 32.7 GM/DL — SIGNIFICANT CHANGE UP (ref 32–36)
MCV RBC AUTO: 90.5 FL — SIGNIFICANT CHANGE UP (ref 80–100)
PLATELET # BLD AUTO: 187 K/UL — SIGNIFICANT CHANGE UP (ref 150–400)
POTASSIUM SERPL-MCNC: 4.5 MMOL/L — SIGNIFICANT CHANGE UP (ref 3.5–5.3)
POTASSIUM SERPL-SCNC: 4.5 MMOL/L — SIGNIFICANT CHANGE UP (ref 3.5–5.3)
PROTHROM AB SERPL-ACNC: 18.7 SEC — HIGH (ref 10.6–13.6)
RBC # BLD: 3.68 M/UL — LOW (ref 4.2–5.8)
RBC # FLD: 13.9 % — SIGNIFICANT CHANGE UP (ref 10.3–14.5)
SARS-COV-2 IGG+IGM SERPL QL IA: 226 U/ML — HIGH
SARS-COV-2 IGG+IGM SERPL QL IA: POSITIVE
SODIUM SERPL-SCNC: 138 MMOL/L — SIGNIFICANT CHANGE UP (ref 135–145)
WBC # BLD: 4.44 K/UL — SIGNIFICANT CHANGE UP (ref 3.8–10.5)
WBC # FLD AUTO: 4.44 K/UL — SIGNIFICANT CHANGE UP (ref 3.8–10.5)

## 2021-09-06 PROCEDURE — 99233 SBSQ HOSP IP/OBS HIGH 50: CPT

## 2021-09-06 RX ORDER — FOLIC ACID 0.8 MG
1 TABLET ORAL
Qty: 0 | Refills: 0 | DISCHARGE

## 2021-09-06 RX ORDER — MUPIROCIN 20 MG/G
1 OINTMENT TOPICAL
Refills: 0 | Status: DISCONTINUED | OUTPATIENT
Start: 2021-09-06 | End: 2021-09-09

## 2021-09-06 RX ORDER — SACCHAROMYCES BOULARDII 250 MG
1 POWDER IN PACKET (EA) ORAL
Qty: 0 | Refills: 0 | DISCHARGE
Start: 2021-09-06

## 2021-09-06 RX ADMIN — Medication 300 MILLIGRAM(S): at 11:46

## 2021-09-06 RX ADMIN — TRAMADOL HYDROCHLORIDE 50 MILLIGRAM(S): 50 TABLET ORAL at 06:32

## 2021-09-06 RX ADMIN — TRAMADOL HYDROCHLORIDE 50 MILLIGRAM(S): 50 TABLET ORAL at 06:02

## 2021-09-06 RX ADMIN — Medication 1 MILLIGRAM(S): at 11:46

## 2021-09-06 RX ADMIN — Medication 250 MILLIGRAM(S): at 16:52

## 2021-09-06 RX ADMIN — QUETIAPINE FUMARATE 12.5 MILLIGRAM(S): 200 TABLET, FILM COATED ORAL at 21:18

## 2021-09-06 RX ADMIN — Medication 20 MILLIGRAM(S): at 06:01

## 2021-09-06 RX ADMIN — LISINOPRIL 2.5 MILLIGRAM(S): 2.5 TABLET ORAL at 06:01

## 2021-09-06 RX ADMIN — MUPIROCIN 1 APPLICATION(S): 20 OINTMENT TOPICAL at 16:58

## 2021-09-06 RX ADMIN — MAGNESIUM OXIDE 400 MG ORAL TABLET 400 MILLIGRAM(S): 241.3 TABLET ORAL at 11:45

## 2021-09-06 RX ADMIN — CLOPIDOGREL BISULFATE 75 MILLIGRAM(S): 75 TABLET, FILM COATED ORAL at 11:45

## 2021-09-06 RX ADMIN — ATORVASTATIN CALCIUM 40 MILLIGRAM(S): 80 TABLET, FILM COATED ORAL at 21:18

## 2021-09-06 RX ADMIN — Medication 100 MILLIGRAM(S): at 14:57

## 2021-09-06 RX ADMIN — TRAMADOL HYDROCHLORIDE 50 MILLIGRAM(S): 50 TABLET ORAL at 21:10

## 2021-09-06 RX ADMIN — CARVEDILOL PHOSPHATE 12.5 MILLIGRAM(S): 80 CAPSULE, EXTENDED RELEASE ORAL at 06:01

## 2021-09-06 RX ADMIN — Medication 100 MILLIGRAM(S): at 21:18

## 2021-09-06 RX ADMIN — TRAMADOL HYDROCHLORIDE 50 MILLIGRAM(S): 50 TABLET ORAL at 20:30

## 2021-09-06 RX ADMIN — Medication 250 MILLIGRAM(S): at 06:01

## 2021-09-06 RX ADMIN — CARVEDILOL PHOSPHATE 12.5 MILLIGRAM(S): 80 CAPSULE, EXTENDED RELEASE ORAL at 16:56

## 2021-09-06 RX ADMIN — Medication 145 MILLIGRAM(S): at 11:46

## 2021-09-06 RX ADMIN — Medication 100 MILLIGRAM(S): at 07:41
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
## 2021-09-06 NOTE — DISCHARGE NOTE PROVIDER - NSDCFUSCHEDAPPT_GEN_ALL_CORE_FT
KAITLIN CORBETT ; 09/07/2021 ; NPP Familymed 9 [facility] KAITLIN Carlson ; 09/17/2021 ; NPP Cardio 9 [facility] KAITLIN Brady ; 10/07/2021 ; NPP OPHTHALM [address] [place] [address] KAITLIN CORBETT ; 09/17/2021 ; NPP Cardio 9 [facility] KAITLIN Brady ; 10/07/2021 ; NPP OPHTHALM [facility]
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
## 2021-09-06 NOTE — PROGRESS NOTE ADULT - SUBJECTIVE AND OBJECTIVE BOX
[facility] Division of Hospital Medicine    Chief Complaint:  left leg pain    SUBJECTIVE / OVERNIGHT EVENTS: Patient seen and examined. Still with pain. Swelling about the same. Says he cannot walk.     Patient denies chest pain, SOB, abd pain, N/V, fever, chills, dysuria or any other complaints. All remainder ROS negative.     MEDICATIONS  (STANDING):  allopurinol 300 milliGRAM(s) Oral daily  atorvastatin 40 milliGRAM(s) Oral at bedtime  carvedilol 12.5 milliGRAM(s) Oral every 12 hours  ceFAZolin   IVPB 2000 milliGRAM(s) IV Intermittent every 8 hours  clopidogrel Tablet 75 milliGRAM(s) Oral daily  dextrose 40% Gel 15 Gram(s) Oral once  dextrose 5%. 1000 milliLiter(s) (50 mL/Hr) IV Continuous <Continuous>  dextrose 5%. 1000 milliLiter(s) (100 mL/Hr) IV Continuous <Continuous>  dextrose 50% Injectable 25 Gram(s) IV Push once  dextrose 50% Injectable 12.5 Gram(s) IV Push once  dextrose 50% Injectable 25 Gram(s) IV Push once  fenofibrate Tablet 145 milliGRAM(s) Oral daily  folic acid 1 milliGRAM(s) Oral daily  furosemide    Tablet 20 milliGRAM(s) Oral daily  glucagon  Injectable 1 milliGRAM(s) IntraMuscular once  influenza   Vaccine 0.5 milliLiter(s) IntraMuscular once  insulin lispro (ADMELOG) corrective regimen sliding scale   SubCutaneous three times a day before meals  lisinopril 2.5 milliGRAM(s) Oral daily  magnesium oxide 400 milliGRAM(s) Oral daily  QUEtiapine 12.5 milliGRAM(s) Oral at bedtime  saccharomyces boulardii 250 milliGRAM(s) Oral two times a day    MEDICATIONS  (PRN):  acetaminophen   Tablet .. 650 milliGRAM(s) Oral every 6 hours PRN Severe Pain (7 - 10)  aluminum hydroxide/magnesium hydroxide/simethicone Suspension 30 milliLiter(s) Oral every 4 hours PRN Dyspepsia  melatonin 3 milliGRAM(s) Oral at bedtime PRN Insomnia  traMADol 50 milliGRAM(s) Oral three times a day PRN Severe Pain (7 - 10)        I&O's Summary    05 Sep 2021 07:01  -  06 Sep 2021 07:00  --------------------------------------------------------  IN: 0 mL / OUT: 400 mL / NET: -400 mL        PHYSICAL EXAM:  Vital Signs Last 24 Hrs  T(C): 36.4 (06 Sep 2021 06:00), Max: 37 (05 Sep 2021 11:42)  T(F): 97.6 (06 Sep 2021 06:00), Max: 98.6 (05 Sep 2021 11:42)  HR: 78 (06 Sep 2021 06:00) (75 - 80)  BP: 150/59 (06 Sep 2021 06:00) (129/63 - 150/59)  BP(mean): --  RR: 18 (06 Sep 2021 06:00) (18 - 18)  SpO2: 95% (06 Sep 2021 06:00) (95% - 98%)        CONSTITUTIONAL: NAD, well-developed,   ENMT: Moist oral mucosa, no pharyngeal injection or exudates;   RESPIRATORY: Normal respiratory effort; lungs are clear to auscultation bilaterally  CARDIOVASCULAR: Regular rate and rhythm, normal S1 and S2, no murmur/rub/gallop;   ABDOMEN: Nontender to palpation, normoactive bowel sounds, no rebound/guarding;  MUSCLOSKELETAL:  left leg diffuse ecchymosis and scrapes  PSYCH: A+O to person, place, and time; affect appropriate  NEUROLOGY: CN 2-12 are intact and symmetric; no gross sensory deficits;   SKIN: No rashes; no palpable lesions    LABS:                        10.9   4.44  )-----------( 187      ( 06 Sep 2021 06:37 )             33.3     09-06    138  |  106  |  25.2<H>  ----------------------------<  117<H>  4.5   |  20.0<L>  |  1.21    Ca    9.7      06 Sep 2021 06:37    TPro  7.4  /  Alb  4.5  /  TBili  0.9  /  DBili  x   /  AST  27  /  ALT  16  /  AlkPhos  63  09-04    PT/INR - ( 06 Sep 2021 06:37 )   PT: 18.7 sec;   INR: 1.65 ratio         PTT - ( 06 Sep 2021 06:37 )  PTT:39.1 sec  CARDIAC MARKERS ( 04 Sep 2021 16:06 )  x     / <0.01 ng/mL / x     / x     / x      CARDIAC MARKERS ( 04 Sep 2021 13:11 )  x     / x     / 138 U/L / x     / x              CAPILLARY BLOOD GLUCOSE      POCT Blood Glucose.: 136 mg/dL (06 Sep 2021 08:08)  POCT Blood Glucose.: 111 mg/dL (05 Sep 2021 21:34)  POCT Blood Glucose.: 135 mg/dL (05 Sep 2021 16:17)  POCT Blood Glucose.: 116 mg/dL (05 Sep 2021 11:46)        RADIOLOGY & ADDITIONAL TESTS:  Results Reviewed:   Imaging Personally Reviewed:  Electrocardiogram Personally Reviewed:

## 2021-09-06 NOTE — DISCHARGE NOTE PROVIDER - NSDCCPCAREPLAN_GEN_ALL_CORE_FT
PRINCIPAL DISCHARGE DIAGNOSIS  Diagnosis: Cellulitis  Assessment and Plan of Treatment: you were treated for skin infection in your L lower leg with iv antibiotics. our infectious disease team evaluated you and recommend continuing your treatment with oral Keflex 500mg three times a day for 5 more days. please follow up with your primary doctor (Dr. Hart)  upon dishcarge.      SECONDARY DISCHARGE DIAGNOSES  Diagnosis: Leg hematoma  Assessment and Plan of Treatment:      PRINCIPAL DISCHARGE DIAGNOSIS  Diagnosis: Cellulitis  Assessment and Plan of Treatment: you were treated for skin infection in your L lower leg with iv antibiotics. our infectious disease team evaluated you and recommend continuing your treatment with oral Keflex 500mg three times a day for 1 more dose  please follow up with your primary doctor (Dr. Hart)  upon dishcarge.      SECONDARY DISCHARGE DIAGNOSES  Diagnosis: Leg hematoma  Assessment and Plan of Treatment: resume Coumadin on 9/14 if bruising and swelling improved

## 2021-09-06 NOTE — PROGRESS NOTE ADULT - SUBJECTIVE AND OBJECTIVE BOX
INFECTIOUS DISEASES AND INTERNAL MEDICINE at [place]  =======================================================  Tuan Beltran MD  Diplomates American Board of Internal Medicine and Infectious Diseases  Telephone [phone]  Fax            [phone]  =======================================================    JUWAN KAITLIN [id number]    Follow up: LEFT LEG HEMATOMA    Allergies:  No Known Allergies      Medications:  acetaminophen   Tablet .. 650 milliGRAM(s) Oral every 6 hours PRN  allopurinol 300 milliGRAM(s) Oral daily  aluminum hydroxide/magnesium hydroxide/simethicone Suspension 30 milliLiter(s) Oral every 4 hours PRN  atorvastatin 40 milliGRAM(s) Oral at bedtime  carvedilol 12.5 milliGRAM(s) Oral every 12 hours  ceFAZolin   IVPB 2000 milliGRAM(s) IV Intermittent every 8 hours  clopidogrel Tablet 75 milliGRAM(s) Oral daily  dextrose 40% Gel 15 Gram(s) Oral once  dextrose 5%. 1000 milliLiter(s) IV Continuous <Continuous>  dextrose 5%. 1000 milliLiter(s) IV Continuous <Continuous>  dextrose 50% Injectable 25 Gram(s) IV Push once  dextrose 50% Injectable 12.5 Gram(s) IV Push once  dextrose 50% Injectable 25 Gram(s) IV Push once  fenofibrate Tablet 145 milliGRAM(s) Oral daily  folic acid 1 milliGRAM(s) Oral daily  furosemide    Tablet 20 milliGRAM(s) Oral daily  glucagon  Injectable 1 milliGRAM(s) IntraMuscular once  influenza   Vaccine 0.5 milliLiter(s) IntraMuscular once  insulin lispro (ADMELOG) corrective regimen sliding scale   SubCutaneous three times a day before meals  lisinopril 2.5 milliGRAM(s) Oral daily  magnesium oxide 400 milliGRAM(s) Oral daily  melatonin 3 milliGRAM(s) Oral at bedtime PRN  mupirocin 2% Ointment 1 Application(s) Topical two times a day  QUEtiapine 12.5 milliGRAM(s) Oral at bedtime  saccharomyces boulardii 250 milliGRAM(s) Oral two times a day  traMADol 50 milliGRAM(s) Oral three times a day PRN    SOCIAL       FAMILY   FAMILY HISTORY:  FH: CHF (congestive heart failure)    FH: myocardial infarction      REVIEW OF SYSTEMS:  CONSTITUTIONAL:  No Fever or chills  HEENT:   No diplopia or blurred vision.  No earache, sore throat or runny nose.  CARDIOVASCULAR:  No pressure, squeezing, strangling, tightness, heaviness or aching about the chest, neck, axilla or epigastrium.  RESPIRATORY:  No cough, shortness of breath, PND or orthopnea.  GASTROINTESTINAL:  No nausea, vomiting or diarrhea.  GENITOURINARY:  No dysuria, frequency or urgency. No Blood in urine  MUSCULOSKELETAL:   moves all joints  SKIN:AS PER HPI  NEUROLOGIC:  No paresthesias, fasciculations, seizures or weakness.  PSYCHIATRIC:  No disorder of thought or mood.  ENDOCRINE:  No heat or cold intolerance, polyuria or polydipsia.  HEMATOLOGICAL:  No easy bruising or bleeding.            Physical Exam:  ICU Vital Signs Last 24 Hrs  T(C): 36.8 (06 Sep 2021 11:49), Max: 36.9 (05 Sep 2021 19:08)  T(F): 98.3 (06 Sep 2021 11:49), Max: 98.5 (05 Sep 2021 19:08)  HR: 80 (06 Sep 2021 11:49) (75 - 80)  BP: 109/53 (06 Sep 2021 11:49) (109/53 - 150/59)  BP(mean): --  ABP: --  ABP(mean): --  RR: 20 (06 Sep 2021 11:49) (18 - 20)  SpO2: 92% (06 Sep 2021 11:49) (92% - 98%)    GEN: NAD,   HEENT: normocephalic and atraumatic. EOMI. YANI.    NECK: Supple. No carotid bruits.  No lymphadenopathy or thyromegaly.  LUNGS: Clear to auscultation.  HEART: Regular rate and rhythm without murmur.  ABDOMEN: Soft, nontender, and nondistended.  Positive bowel sounds.    : No CVA tenderness  EXTREMITIES: DECREASED EDEMA DECREASED ECCHYMOSIS  TIBIAL AREA  AND ECCYMOSIS  MSK: no joint swelling  NEUROLOGIC: Cranial nerves II through XII are grossly intact.  PSYCHIATRIC: Appropriate affect .  SKIN: No ulceration or induration present.        Labs:  Vitals:  ============  T(F): 98.3 (06 Sep 2021 11:49), Max: 98.5 (05 Sep 2021 19:08)  HR: 80 (06 Sep 2021 11:49)  BP: 109/53 (06 Sep 2021 11:49)  RR: 20 (06 Sep 2021 11:49)  SpO2: 92% (06 Sep 2021 11:49) (92% - 98%)  temp max in last 48H T(F): , Max: 98.9 (09-04-21 @ 20:03)    =======================================================  Current Antibiotics:  ceFAZolin   IVPB 2000 milliGRAM(s) IV Intermittent every 8 hours    Other medications:  allopurinol 300 milliGRAM(s) Oral daily  atorvastatin 40 milliGRAM(s) Oral at bedtime  carvedilol 12.5 milliGRAM(s) Oral every 12 hours  clopidogrel Tablet 75 milliGRAM(s) Oral daily  dextrose 40% Gel 15 Gram(s) Oral once  dextrose 5%. 1000 milliLiter(s) IV Continuous <Continuous>  dextrose 5%. 1000 milliLiter(s) IV Continuous <Continuous>  dextrose 50% Injectable 25 Gram(s) IV Push once  dextrose 50% Injectable 12.5 Gram(s) IV Push once  dextrose 50% Injectable 25 Gram(s) IV Push once  fenofibrate Tablet 145 milliGRAM(s) Oral daily  folic acid 1 milliGRAM(s) Oral daily  furosemide    Tablet 20 milliGRAM(s) Oral daily  glucagon  Injectable 1 milliGRAM(s) IntraMuscular once  influenza   Vaccine 0.5 milliLiter(s) IntraMuscular once  insulin lispro (ADMELOG) corrective regimen sliding scale   SubCutaneous three times a day before meals  lisinopril 2.5 milliGRAM(s) Oral daily  magnesium oxide 400 milliGRAM(s) Oral daily  mupirocin 2% Ointment 1 Application(s) Topical two times a day  QUEtiapine 12.5 milliGRAM(s) Oral at bedtime  saccharomyces boulardii 250 milliGRAM(s) Oral two times a day      =======================================================  Labs:                        10.9   4.44  )-----------( 187      ( 06 Sep 2021 06:37 )             33.3     09-06    138  |  106  |  25.2<H>  ----------------------------<  117<H>  4.5   |  20.0<L>  |  1.21    Ca    9.7      06 Sep 2021 06:37    TPro  7.4  /  Alb  4.5  /  TBili  0.9  /  DBili  x   /  AST  27  /  ALT  16  /  AlkPhos  63  09-04      Creatinine, Serum: 1.21 mg/dL (09-06-21 @ 06:37)  Creatinine, Serum: 1.49 mg/dL (09-04-21 @ 13:11)            WBC Count: 4.44 K/uL (09-06-21 @ 06:37)  WBC Count: 4.52 K/uL (09-04-21 @ 13:11)      COVID-19 PCR: NotDetec (09-04-21 @ 13:24)      Alkaline Phosphatase, Serum: 63 U/L (09-04-21 @ 13:11)  Alanine Aminotransferase (ALT/SGPT): 16 U/L (09-04-21 @ 13:11)  Aspartate Aminotransferase (AST/SGOT): 27 U/L (09-04-21 @ 13:11)  Bilirubin Total, Serum: 0.9 mg/dL (09-04-21 @ 13:11)

## 2021-09-06 NOTE — DISCHARGE NOTE PROVIDER - CARE PROVIDER_API CALL
Isaiah Hart)  Family Medicine; Geriatric Medicine  [address] [place] [address]  Phone: [phone]  Fax: [phone]  Follow Up Time:

## 2021-09-06 NOTE — DISCHARGE NOTE PROVIDER - NSDCMRMEDTOKEN_GEN_ALL_CORE_FT
allopurinol 300 mg oral tablet: 1 tab(s) orally once a day  atorvastatin 40 mg oral tablet: 1 tab(s) orally once a day  clopidogrel 75 mg oral tablet: 1 tab(s) orally once a day  Coreg 12.5 mg oral tablet: 1 tab(s) orally 2 times a day  Entresto 24 mg-26 mg oral tablet: 1 tab(s) orally 2 times a day  restart on 6/5/20 in am  fenofibrate 160 mg oral tablet: 1 tab(s) orally once a day  isosorbide mononitrate 10 mg oral tablet: 1 tab(s) orally once a day  QUEtiapine 25 mg oral tablet: 0.5 tab(s) orally once a day (at bedtime)  saccharomyces boulardii lyo 250 mg oral capsule: 1 cap(s) orally 2 times a day  tiZANidine 4 mg oral capsule: 1 cap(s) orally 3 times a day, As Needed  traMADol 50 mg oral tablet: 1 tab(s) orally 3 times a day, As Needed  warfarin 5 mg oral tablet: 1 tab(s) orally once a day   allopurinol 300 mg oral tablet: 1 tab(s) orally once a day  atorvastatin 40 mg oral tablet: 1 tab(s) orally once a day  cephalexin 500 mg oral capsule: 1 cap(s) orally 3 times a day  clopidogrel 75 mg oral tablet: 1 tab(s) orally once a day  Coreg 12.5 mg oral tablet: 1 tab(s) orally 2 times a day  Entresto 24 mg-26 mg oral tablet: 1 tab(s) orally 2 times a day  restart on 6/5/20 in am  fenofibrate 160 mg oral tablet: 1 tab(s) orally once a day  isosorbide mononitrate 10 mg oral tablet: 1 tab(s) orally once a day  QUEtiapine 25 mg oral tablet: 0.5 tab(s) orally once a day (at bedtime)  saccharomyces boulardii lyo 250 mg oral capsule: 1 cap(s) orally 2 times a day  silver sulfADIAZINE 1% topical cream: 1 application topically once a day  tiZANidine 4 mg oral capsule: 1 cap(s) orally 3 times a day, As Needed  traMADol 50 mg oral tablet: 1 tab(s) orally 3 times a day, As Needed  warfarin 5 mg oral tablet: 1 tab(s) orally once a day   allopurinol 300 mg oral tablet: 1 tab(s) orally once a day  atorvastatin 40 mg oral tablet: 1 tab(s) orally once a day  cephalexin 500 mg oral capsule: only needs one more dose at 10pm on 9/10 then can stop  clopidogrel 75 mg oral tablet: 1 tab(s) orally once a day  Coreg 12.5 mg oral tablet: 1 tab(s) orally 2 times a day  fenofibrate 160 mg oral tablet: 1 tab(s) orally once a day  furosemide 20 mg oral tablet: 1 tab(s) orally once a day  isosorbide mononitrate 10 mg oral tablet: 1 tab(s) orally once a day  QUEtiapine 25 mg oral tablet: 0.5 tab(s) orally once a day (at bedtime)  saccharomyces boulardii lyo 250 mg oral capsule: 1 cap(s) orally 2 times a day  silver sulfADIAZINE 1% topical cream: 1 application topically once a day  tiZANidine 4 mg oral capsule: 1 cap(s) orally 3 times a day, As Needed  traMADol 50 mg oral tablet: 1 tab(s) orally 3 times a day, As Needed  warfarin 5 mg oral tablet: 1 tab(s) orally once a day resume on 9/14 if bruising improved
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
## 2021-09-07 ENCOUNTER — APPOINTMENT (OUTPATIENT)
Dept: FAMILY MEDICINE | Facility: CLINIC | Age: 86
End: 2021-09-07

## 2021-09-07 LAB
ANION GAP SERPL CALC-SCNC: 15 MMOL/L — SIGNIFICANT CHANGE UP (ref 5–17)
BASOPHILS # BLD AUTO: 0.04 K/UL — SIGNIFICANT CHANGE UP (ref 0–0.2)
BASOPHILS NFR BLD AUTO: 0.8 % — SIGNIFICANT CHANGE UP (ref 0–2)
BUN SERPL-MCNC: 30.8 MG/DL — HIGH (ref 8–20)
CALCIUM SERPL-MCNC: 9.7 MG/DL — SIGNIFICANT CHANGE UP (ref 8.6–10.2)
CHLORIDE SERPL-SCNC: 100 MMOL/L — SIGNIFICANT CHANGE UP (ref 98–107)
CO2 SERPL-SCNC: 21 MMOL/L — LOW (ref 22–29)
CREAT SERPL-MCNC: 1.51 MG/DL — HIGH (ref 0.5–1.3)
EOSINOPHIL # BLD AUTO: 0.18 K/UL — SIGNIFICANT CHANGE UP (ref 0–0.5)
EOSINOPHIL NFR BLD AUTO: 3.6 % — SIGNIFICANT CHANGE UP (ref 0–6)
GLUCOSE BLDC GLUCOMTR-MCNC: 109 MG/DL — HIGH (ref 70–99)
GLUCOSE BLDC GLUCOMTR-MCNC: 125 MG/DL — HIGH (ref 70–99)
GLUCOSE BLDC GLUCOMTR-MCNC: 132 MG/DL — HIGH (ref 70–99)
GLUCOSE BLDC GLUCOMTR-MCNC: 140 MG/DL — HIGH (ref 70–99)
GLUCOSE SERPL-MCNC: 109 MG/DL — HIGH (ref 70–99)
HCT VFR BLD CALC: 34.1 % — LOW (ref 39–50)
HGB BLD-MCNC: 11.2 G/DL — LOW (ref 13–17)
IMM GRANULOCYTES NFR BLD AUTO: 0.4 % — SIGNIFICANT CHANGE UP (ref 0–1.5)
LYMPHOCYTES # BLD AUTO: 1.25 K/UL — SIGNIFICANT CHANGE UP (ref 1–3.3)
LYMPHOCYTES # BLD AUTO: 24.7 % — SIGNIFICANT CHANGE UP (ref 13–44)
MAGNESIUM SERPL-MCNC: 1.9 MG/DL — SIGNIFICANT CHANGE UP (ref 1.6–2.6)
MCHC RBC-ENTMCNC: 29.8 PG — SIGNIFICANT CHANGE UP (ref 27–34)
MCHC RBC-ENTMCNC: 32.8 GM/DL — SIGNIFICANT CHANGE UP (ref 32–36)
MCV RBC AUTO: 90.7 FL — SIGNIFICANT CHANGE UP (ref 80–100)
MONOCYTES # BLD AUTO: 0.81 K/UL — SIGNIFICANT CHANGE UP (ref 0–0.9)
MONOCYTES NFR BLD AUTO: 16 % — HIGH (ref 2–14)
NEUTROPHILS # BLD AUTO: 2.76 K/UL — SIGNIFICANT CHANGE UP (ref 1.8–7.4)
NEUTROPHILS NFR BLD AUTO: 54.5 % — SIGNIFICANT CHANGE UP (ref 43–77)
PLATELET # BLD AUTO: 192 K/UL — SIGNIFICANT CHANGE UP (ref 150–400)
POTASSIUM SERPL-MCNC: 4.9 MMOL/L — SIGNIFICANT CHANGE UP (ref 3.5–5.3)
POTASSIUM SERPL-SCNC: 4.9 MMOL/L — SIGNIFICANT CHANGE UP (ref 3.5–5.3)
RBC # BLD: 3.76 M/UL — LOW (ref 4.2–5.8)
RBC # FLD: 14 % — SIGNIFICANT CHANGE UP (ref 10.3–14.5)
SODIUM SERPL-SCNC: 136 MMOL/L — SIGNIFICANT CHANGE UP (ref 135–145)
WBC # BLD: 5.06 K/UL — SIGNIFICANT CHANGE UP (ref 3.8–10.5)
WBC # FLD AUTO: 5.06 K/UL — SIGNIFICANT CHANGE UP (ref 3.8–10.5)

## 2021-09-07 PROCEDURE — 73701 CT LOWER EXTREMITY W/DYE: CPT | Mod: 26,LT

## 2021-09-07 PROCEDURE — 99232 SBSQ HOSP IP/OBS MODERATE 35: CPT

## 2021-09-07 RX ORDER — SODIUM CHLORIDE 9 MG/ML
1000 INJECTION INTRAMUSCULAR; INTRAVENOUS; SUBCUTANEOUS
Refills: 0 | Status: DISCONTINUED | OUTPATIENT
Start: 2021-09-07 | End: 2021-09-08

## 2021-09-07 RX ORDER — TRAMADOL HYDROCHLORIDE 50 MG/1
50 TABLET ORAL THREE TIMES A DAY
Refills: 0 | Status: DISCONTINUED | OUTPATIENT
Start: 2021-09-07 | End: 2021-09-10

## 2021-09-07 RX ADMIN — LISINOPRIL 2.5 MILLIGRAM(S): 2.5 TABLET ORAL at 05:33

## 2021-09-07 RX ADMIN — Medication 650 MILLIGRAM(S): at 10:31

## 2021-09-07 RX ADMIN — MAGNESIUM OXIDE 400 MG ORAL TABLET 400 MILLIGRAM(S): 241.3 TABLET ORAL at 11:59

## 2021-09-07 RX ADMIN — Medication 100 MILLIGRAM(S): at 14:37

## 2021-09-07 RX ADMIN — Medication 100 MILLIGRAM(S): at 05:29

## 2021-09-07 RX ADMIN — TRAMADOL HYDROCHLORIDE 50 MILLIGRAM(S): 50 TABLET ORAL at 23:49

## 2021-09-07 RX ADMIN — QUETIAPINE FUMARATE 12.5 MILLIGRAM(S): 200 TABLET, FILM COATED ORAL at 22:43

## 2021-09-07 RX ADMIN — TRAMADOL HYDROCHLORIDE 50 MILLIGRAM(S): 50 TABLET ORAL at 17:35

## 2021-09-07 RX ADMIN — Medication 300 MILLIGRAM(S): at 11:59

## 2021-09-07 RX ADMIN — Medication 1 MILLIGRAM(S): at 11:59

## 2021-09-07 RX ADMIN — Medication 650 MILLIGRAM(S): at 11:00

## 2021-09-07 RX ADMIN — Medication 250 MILLIGRAM(S): at 05:33

## 2021-09-07 RX ADMIN — Medication 250 MILLIGRAM(S): at 17:27

## 2021-09-07 RX ADMIN — Medication 20 MILLIGRAM(S): at 05:33

## 2021-09-07 RX ADMIN — CLOPIDOGREL BISULFATE 75 MILLIGRAM(S): 75 TABLET, FILM COATED ORAL at 11:59

## 2021-09-07 RX ADMIN — Medication 145 MILLIGRAM(S): at 11:59

## 2021-09-07 RX ADMIN — Medication 100 MILLIGRAM(S): at 22:43

## 2021-09-07 RX ADMIN — CARVEDILOL PHOSPHATE 12.5 MILLIGRAM(S): 80 CAPSULE, EXTENDED RELEASE ORAL at 17:27

## 2021-09-07 RX ADMIN — CARVEDILOL PHOSPHATE 12.5 MILLIGRAM(S): 80 CAPSULE, EXTENDED RELEASE ORAL at 05:33

## 2021-09-07 RX ADMIN — MUPIROCIN 1 APPLICATION(S): 20 OINTMENT TOPICAL at 18:08

## 2021-09-07 RX ADMIN — TRAMADOL HYDROCHLORIDE 50 MILLIGRAM(S): 50 TABLET ORAL at 22:49

## 2021-09-07 RX ADMIN — ATORVASTATIN CALCIUM 40 MILLIGRAM(S): 80 TABLET, FILM COATED ORAL at 22:43

## 2021-09-07 RX ADMIN — MUPIROCIN 1 APPLICATION(S): 20 OINTMENT TOPICAL at 05:33
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
## 2021-09-07 NOTE — CONSULT NOTE ADULT - ASSESSMENT
89 M with extensive cardiac history including afib on coumadin who sustained traumatic injury to left lower extremity without fractures. Admitted 6 days after incident with surgery consult 8 days after incident for concern of LLE hematoma with cellulitis.   - No surgical intervention at this time  - Warm compress to lower extremity  - Obtain MRI to fully evaluate extent of lower extremity traumatic injuries and associated hematoma.  - Surgery will continue to follow  89 M with extensive cardiac history including afib on coumadin who sustained traumatic injury to left lower extremity without fractures. Admitted 6 days after incident with surgery consult 8 days after incident for concern of LLE hematoma with cellulitis.   - No surgical intervention at this time  - Warm compress to lower extremity  - Betadine to necrotic tissue  - Obtain MRI to fully evaluate extent of lower extremity traumatic injuries and associated hematoma.  - Surgery will continue to follow  89 M with extensive cardiac history including afib on coumadin who sustained traumatic injury to left lower extremity without fractures. Admitted 6 days after incident with surgery consult 8 days after incident for concern of LLE hematoma with cellulitis.   - No surgical intervention at this time  - Warm compress to lower extremity  - Betadine to necrotic tissue  - Obtain MRI to fully evaluate extent of lower extremity traumatic injuries and associated hematoma. IF ICD is not MRI compatible CT with IV contrast   - Surgery will continue to follow

## 2021-09-07 NOTE — PROGRESS NOTE PEDS - SUBJECTIVE AND OBJECTIVE BOX
[facility] Division of Hospital Medicine    Chief Complaint:  left leg pain    SUBJECTIVE / OVERNIGHT EVENTS: patient seen and examined. He still cannot stand on his left leg. reports increased pain.     Patient denies chest pain, SOB, abd pain, N/V, fever, chills, dysuria or any other complaints. All remainder ROS negative.     MEDICATIONS  (STANDING):  allopurinol 300 milliGRAM(s) Oral daily  atorvastatin 40 milliGRAM(s) Oral at bedtime  carvedilol 12.5 milliGRAM(s) Oral every 12 hours  ceFAZolin   IVPB 2000 milliGRAM(s) IV Intermittent every 8 hours  clopidogrel Tablet 75 milliGRAM(s) Oral daily  dextrose 40% Gel 15 Gram(s) Oral once  dextrose 5%. 1000 milliLiter(s) (50 mL/Hr) IV Continuous <Continuous>  dextrose 5%. 1000 milliLiter(s) (100 mL/Hr) IV Continuous <Continuous>  dextrose 50% Injectable 25 Gram(s) IV Push once  dextrose 50% Injectable 12.5 Gram(s) IV Push once  dextrose 50% Injectable 25 Gram(s) IV Push once  fenofibrate Tablet 145 milliGRAM(s) Oral daily  folic acid 1 milliGRAM(s) Oral daily  furosemide    Tablet 20 milliGRAM(s) Oral daily  glucagon  Injectable 1 milliGRAM(s) IntraMuscular once  influenza   Vaccine 0.5 milliLiter(s) IntraMuscular once  insulin lispro (ADMELOG) corrective regimen sliding scale   SubCutaneous three times a day before meals  lisinopril 2.5 milliGRAM(s) Oral daily  magnesium oxide 400 milliGRAM(s) Oral daily  mupirocin 2% Ointment 1 Application(s) Topical two times a day  QUEtiapine 12.5 milliGRAM(s) Oral at bedtime  saccharomyces boulardii 250 milliGRAM(s) Oral two times a day    MEDICATIONS  (PRN):  acetaminophen   Tablet .. 650 milliGRAM(s) Oral every 6 hours PRN Severe Pain (7 - 10)  aluminum hydroxide/magnesium hydroxide/simethicone Suspension 30 milliLiter(s) Oral every 4 hours PRN Dyspepsia  melatonin 3 milliGRAM(s) Oral at bedtime PRN Insomnia  traMADol 50 milliGRAM(s) Oral three times a day PRN Severe Pain (7 - 10)        I&O's Summary      PHYSICAL EXAM:  Vital Signs Last 24 Hrs  T(C): 36.6 (07 Sep 2021 04:13), Max: 36.8 (06 Sep 2021 11:49)  T(F): 97.9 (07 Sep 2021 04:13), Max: 98.3 (06 Sep 2021 11:49)  HR: 86 (07 Sep 2021 04:13) (73 - 86)  BP: 134/68 (07 Sep 2021 04:13) (109/53 - 139/64)  BP(mean): --  RR: 18 (07 Sep 2021 04:13) (18 - 20)  SpO2: 96% (07 Sep 2021 04:13) (92% - 96%)      CONSTITUTIONAL: NAD, well-developed,   ENMT: Moist oral mucosa, no pharyngeal injection or exudates;   RESPIRATORY: Normal respiratory effort; lungs are clear to auscultation bilaterally  CARDIOVASCULAR: Regular rate and rhythm, normal S1 and S2, no murmur/rub/gallop;   ABDOMEN: Nontender to palpation, normoactive bowel sounds, no rebound/guarding;  MUSCLOSKELETAL:  left leg diffuse ecchymosis and scrapes  PSYCH: A+O to person, place, and time; affect appropriate  NEUROLOGY: CN 2-12 are intact and symmetric; no gross sensory deficits;   SKIN: No rashes; no palpable lesions    LABS:                        11.2   5.06  )-----------( 192      ( 07 Sep 2021 07:22 )             34.1     09-07    136  |  100  |  30.8<H>  ----------------------------<  109<H>  4.9   |  21.0<L>  |  1.51<H>    Ca    9.7      07 Sep 2021 07:22  Mg     1.9     09-07      PT/INR - ( 06 Sep 2021 06:37 )   PT: 18.7 sec;   INR: 1.65 ratio         PTT - ( 06 Sep 2021 06:37 )  PTT:39.1 sec          Culture - Blood (collected 04 Sep 2021 13:52)  Source: .Blood Blood-Peripheral  Preliminary Report (06 Sep 2021 15:01):    No growth at 48 hours    Culture - Blood (collected 04 Sep 2021 13:51)  Source: .Blood Blood-Peripheral  Preliminary Report (06 Sep 2021 15:01):    No growth at 48 hours      CAPILLARY BLOOD GLUCOSE      POCT Blood Glucose.: 140 mg/dL (07 Sep 2021 08:17)  POCT Blood Glucose.: 142 mg/dL (06 Sep 2021 21:16)  POCT Blood Glucose.: 116 mg/dL (06 Sep 2021 16:26)  POCT Blood Glucose.: 126 mg/dL (06 Sep 2021 11:20)        RADIOLOGY & ADDITIONAL TESTS:  Results Reviewed:   Imaging Personally Reviewed:  Electrocardiogram Personally Reviewed:

## 2021-09-07 NOTE — CHART NOTE - NSCHARTNOTEFT_GEN_A_CORE
The patient has a Medtronic Evera XT DR dual chamber ICD system SN: [id number] followed by Dr. Leandro Oh at [facility]  RA lead: [employer] Tendril [id number] implanted 2/16/2007 SN: [id number]  RV lead: Medtronic Sprint Pravin [id number] implanted 2/16/2007 SN: [id number]    RA lead is NOT MRI compatible and system is mixed    Therefor this is NOT an MRI compatible system. Please seek alternative imaging - CT with IV contrast.

## 2021-09-07 NOTE — CHART NOTE - NSCHARTNOTEFT_GEN_A_CORE
Patient not able to have MRI as his device is not MRI compatible.   CT with IV contrast ordered  will hydrate for 12 hours prior to scan to avoid LEANDER  will also hold lasix and acei will resume after scans
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
## 2021-09-08 LAB
ANION GAP SERPL CALC-SCNC: 11 MMOL/L — SIGNIFICANT CHANGE UP (ref 5–17)
BASOPHILS # BLD AUTO: 0.03 K/UL — SIGNIFICANT CHANGE UP (ref 0–0.2)
BASOPHILS NFR BLD AUTO: 0.7 % — SIGNIFICANT CHANGE UP (ref 0–2)
BUN SERPL-MCNC: 40.5 MG/DL — HIGH (ref 8–20)
CALCIUM SERPL-MCNC: 10.1 MG/DL — SIGNIFICANT CHANGE UP (ref 8.6–10.2)
CHLORIDE SERPL-SCNC: 103 MMOL/L — SIGNIFICANT CHANGE UP (ref 98–107)
CO2 SERPL-SCNC: 22 MMOL/L — SIGNIFICANT CHANGE UP (ref 22–29)
CREAT SERPL-MCNC: 1.59 MG/DL — HIGH (ref 0.5–1.3)
EOSINOPHIL # BLD AUTO: 0.18 K/UL — SIGNIFICANT CHANGE UP (ref 0–0.5)
EOSINOPHIL NFR BLD AUTO: 3.9 % — SIGNIFICANT CHANGE UP (ref 0–6)
GLUCOSE BLDC GLUCOMTR-MCNC: 125 MG/DL — HIGH (ref 70–99)
GLUCOSE BLDC GLUCOMTR-MCNC: 129 MG/DL — HIGH (ref 70–99)
GLUCOSE BLDC GLUCOMTR-MCNC: 130 MG/DL — HIGH (ref 70–99)
GLUCOSE BLDC GLUCOMTR-MCNC: 131 MG/DL — HIGH (ref 70–99)
GLUCOSE SERPL-MCNC: 122 MG/DL — HIGH (ref 70–99)
HCT VFR BLD CALC: 32.9 % — LOW (ref 39–50)
HGB BLD-MCNC: 10.9 G/DL — LOW (ref 13–17)
IMM GRANULOCYTES NFR BLD AUTO: 0.2 % — SIGNIFICANT CHANGE UP (ref 0–1.5)
INR BLD: 1.37 RATIO — HIGH (ref 0.88–1.16)
LYMPHOCYTES # BLD AUTO: 1.08 K/UL — SIGNIFICANT CHANGE UP (ref 1–3.3)
LYMPHOCYTES # BLD AUTO: 23.7 % — SIGNIFICANT CHANGE UP (ref 13–44)
MAGNESIUM SERPL-MCNC: 2 MG/DL — SIGNIFICANT CHANGE UP (ref 1.6–2.6)
MCHC RBC-ENTMCNC: 29.9 PG — SIGNIFICANT CHANGE UP (ref 27–34)
MCHC RBC-ENTMCNC: 33.1 GM/DL — SIGNIFICANT CHANGE UP (ref 32–36)
MCV RBC AUTO: 90.1 FL — SIGNIFICANT CHANGE UP (ref 80–100)
MONOCYTES # BLD AUTO: 0.74 K/UL — SIGNIFICANT CHANGE UP (ref 0–0.9)
MONOCYTES NFR BLD AUTO: 16.2 % — HIGH (ref 2–14)
NEUTROPHILS # BLD AUTO: 2.52 K/UL — SIGNIFICANT CHANGE UP (ref 1.8–7.4)
NEUTROPHILS NFR BLD AUTO: 55.3 % — SIGNIFICANT CHANGE UP (ref 43–77)
PLATELET # BLD AUTO: 199 K/UL — SIGNIFICANT CHANGE UP (ref 150–400)
POTASSIUM SERPL-MCNC: 5.3 MMOL/L — SIGNIFICANT CHANGE UP (ref 3.5–5.3)
POTASSIUM SERPL-SCNC: 5.3 MMOL/L — SIGNIFICANT CHANGE UP (ref 3.5–5.3)
PROTHROM AB SERPL-ACNC: 15.7 SEC — HIGH (ref 10.6–13.6)
RBC # BLD: 3.65 M/UL — LOW (ref 4.2–5.8)
RBC # FLD: 13.8 % — SIGNIFICANT CHANGE UP (ref 10.3–14.5)
SODIUM SERPL-SCNC: 136 MMOL/L — SIGNIFICANT CHANGE UP (ref 135–145)
WBC # BLD: 4.56 K/UL — SIGNIFICANT CHANGE UP (ref 3.8–10.5)
WBC # FLD AUTO: 4.56 K/UL — SIGNIFICANT CHANGE UP (ref 3.8–10.5)

## 2021-09-08 PROCEDURE — 99232 SBSQ HOSP IP/OBS MODERATE 35: CPT

## 2021-09-08 RX ORDER — SODIUM CHLORIDE 9 MG/ML
1000 INJECTION INTRAMUSCULAR; INTRAVENOUS; SUBCUTANEOUS
Refills: 0 | Status: COMPLETED | OUTPATIENT
Start: 2021-09-08 | End: 2021-09-08

## 2021-09-08 RX ADMIN — Medication 1 MILLIGRAM(S): at 11:16

## 2021-09-08 RX ADMIN — Medication 100 MILLIGRAM(S): at 22:48

## 2021-09-08 RX ADMIN — Medication 250 MILLIGRAM(S): at 17:50

## 2021-09-08 RX ADMIN — CARVEDILOL PHOSPHATE 12.5 MILLIGRAM(S): 80 CAPSULE, EXTENDED RELEASE ORAL at 06:12

## 2021-09-08 RX ADMIN — Medication 100 MILLIGRAM(S): at 06:12

## 2021-09-08 RX ADMIN — TRAMADOL HYDROCHLORIDE 50 MILLIGRAM(S): 50 TABLET ORAL at 07:12

## 2021-09-08 RX ADMIN — CARVEDILOL PHOSPHATE 12.5 MILLIGRAM(S): 80 CAPSULE, EXTENDED RELEASE ORAL at 17:50

## 2021-09-08 RX ADMIN — Medication 100 MILLIGRAM(S): at 13:11

## 2021-09-08 RX ADMIN — TRAMADOL HYDROCHLORIDE 50 MILLIGRAM(S): 50 TABLET ORAL at 21:56

## 2021-09-08 RX ADMIN — TRAMADOL HYDROCHLORIDE 50 MILLIGRAM(S): 50 TABLET ORAL at 16:52

## 2021-09-08 RX ADMIN — MAGNESIUM OXIDE 400 MG ORAL TABLET 400 MILLIGRAM(S): 241.3 TABLET ORAL at 11:15

## 2021-09-08 RX ADMIN — MUPIROCIN 1 APPLICATION(S): 20 OINTMENT TOPICAL at 06:12

## 2021-09-08 RX ADMIN — Medication 1 APPLICATION(S): at 11:17

## 2021-09-08 RX ADMIN — MUPIROCIN 1 APPLICATION(S): 20 OINTMENT TOPICAL at 17:50

## 2021-09-08 RX ADMIN — CLOPIDOGREL BISULFATE 75 MILLIGRAM(S): 75 TABLET, FILM COATED ORAL at 11:16

## 2021-09-08 RX ADMIN — TRAMADOL HYDROCHLORIDE 50 MILLIGRAM(S): 50 TABLET ORAL at 06:12

## 2021-09-08 RX ADMIN — Medication 145 MILLIGRAM(S): at 11:16

## 2021-09-08 RX ADMIN — QUETIAPINE FUMARATE 12.5 MILLIGRAM(S): 200 TABLET, FILM COATED ORAL at 20:56

## 2021-09-08 RX ADMIN — Medication 650 MILLIGRAM(S): at 11:19

## 2021-09-08 RX ADMIN — Medication 250 MILLIGRAM(S): at 06:12

## 2021-09-08 RX ADMIN — Medication 650 MILLIGRAM(S): at 13:04

## 2021-09-08 RX ADMIN — Medication 300 MILLIGRAM(S): at 11:16

## 2021-09-08 RX ADMIN — TRAMADOL HYDROCHLORIDE 50 MILLIGRAM(S): 50 TABLET ORAL at 20:56

## 2021-09-08 RX ADMIN — ATORVASTATIN CALCIUM 40 MILLIGRAM(S): 80 TABLET, FILM COATED ORAL at 20:56
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
## 2021-09-08 NOTE — PROGRESS NOTE ADULT - SUBJECTIVE AND OBJECTIVE BOX
INTERVAL HPI/OVERNIGHT EVENTS:    Patient with continued LLE pain that prevents ambulation. Skin changes noted to be stable. Has no changes in sensation. Pending CT of LLE. No fevers or chills.     MEDICATIONS  (STANDING):  allopurinol 300 milliGRAM(s) Oral daily  atorvastatin 40 milliGRAM(s) Oral at bedtime  carvedilol 12.5 milliGRAM(s) Oral every 12 hours  ceFAZolin   IVPB 2000 milliGRAM(s) IV Intermittent every 8 hours  clopidogrel Tablet 75 milliGRAM(s) Oral daily  dextrose 40% Gel 15 Gram(s) Oral once  dextrose 5%. 1000 milliLiter(s) (100 mL/Hr) IV Continuous <Continuous>  dextrose 5%. 1000 milliLiter(s) (50 mL/Hr) IV Continuous <Continuous>  dextrose 50% Injectable 25 Gram(s) IV Push once  dextrose 50% Injectable 12.5 Gram(s) IV Push once  dextrose 50% Injectable 25 Gram(s) IV Push once  fenofibrate Tablet 145 milliGRAM(s) Oral daily  folic acid 1 milliGRAM(s) Oral daily  glucagon  Injectable 1 milliGRAM(s) IntraMuscular once  influenza   Vaccine 0.5 milliLiter(s) IntraMuscular once  insulin lispro (ADMELOG) corrective regimen sliding scale   SubCutaneous three times a day before meals  magnesium oxide 400 milliGRAM(s) Oral daily  mupirocin 2% Ointment 1 Application(s) Topical two times a day  QUEtiapine 12.5 milliGRAM(s) Oral at bedtime  saccharomyces boulardii 250 milliGRAM(s) Oral two times a day  sodium chloride 0.9%. 1000 milliLiter(s) (75 mL/Hr) IV Continuous <Continuous>    MEDICATIONS  (PRN):  acetaminophen   Tablet .. 650 milliGRAM(s) Oral every 6 hours PRN Severe Pain (7 - 10)  aluminum hydroxide/magnesium hydroxide/simethicone Suspension 30 milliLiter(s) Oral every 4 hours PRN Dyspepsia  melatonin 3 milliGRAM(s) Oral at bedtime PRN Insomnia  traMADol 50 milliGRAM(s) Oral three times a day PRN Severe Pain (7 - 10)      Vital Signs Last 24 Hrs  T(C): 36.8 (07 Sep 2021 17:21), Max: 36.8 (07 Sep 2021 17:21)  T(F): 98.2 (07 Sep 2021 17:21), Max: 98.2 (07 Sep 2021 17:21)  HR: 74 (07 Sep 2021 17:21) (73 - 86)  BP: 126/59 (07 Sep 2021 17:21) (97/68 - 134/68)  BP(mean): --  RR: 18 (07 Sep 2021 17:21) (18 - 18)  SpO2: 97% (07 Sep 2021 17:21) (93% - 97%)      PHYSICAL EXAM:  GEN: NAD, laying in bed, appears stated age  HEENT: NCAT, clear oral mucosa, normal conjunctiva  Chest: non-tender  CV:  non-tachycardic, 2+ radial pulse  Pulm: no increased work of breathing, no conversational dyspnea  GI: soft,non tender  MSK: moving all extremities, LLE with necrotic skin changes to superficial tissue. tender to palpation with marked ecchymosis. Area feels tense, but posterior compartments remain soft.   2+ distal pulses      I&O's Detail      LABS:                        11.2   5.06  )-----------( 192      ( 07 Sep 2021 07:22 )             34.1     09-07    136  |  100  |  30.8<H>  ----------------------------<  109<H>  4.9   |  21.0<L>  |  1.51<H>    Ca    9.7      07 Sep 2021 07:22  Mg     1.9     09-07      PT/INR - ( 06 Sep 2021 06:37 )   PT: 18.7 sec;   INR: 1.65 ratio         PTT - ( 06 Sep 2021 06:37 )  PTT:39.1 sec      RADIOLOGY & ADDITIONAL STUDIES:

## 2021-09-08 NOTE — PROGRESS NOTE ADULT - SUBJECTIVE AND OBJECTIVE BOX
[facility] Division of Hospital Medicine    Chief Complaint:  left leg pain    SUBJECTIVE / OVERNIGHT EVENTS: Patient seen and examined. Still with pain. Awaiting Ct scan read    Patient denies chest pain, SOB, abd pain, N/V, fever, chills, dysuria or any other complaints. All remainder ROS negative.     MEDICATIONS  (STANDING):  allopurinol 300 milliGRAM(s) Oral daily  atorvastatin 40 milliGRAM(s) Oral at bedtime  carvedilol 12.5 milliGRAM(s) Oral every 12 hours  ceFAZolin   IVPB 2000 milliGRAM(s) IV Intermittent every 8 hours  clopidogrel Tablet 75 milliGRAM(s) Oral daily  dextrose 40% Gel 15 Gram(s) Oral once  dextrose 5%. 1000 milliLiter(s) (50 mL/Hr) IV Continuous <Continuous>  dextrose 5%. 1000 milliLiter(s) (100 mL/Hr) IV Continuous <Continuous>  dextrose 50% Injectable 25 Gram(s) IV Push once  dextrose 50% Injectable 12.5 Gram(s) IV Push once  dextrose 50% Injectable 25 Gram(s) IV Push once  fenofibrate Tablet 145 milliGRAM(s) Oral daily  folic acid 1 milliGRAM(s) Oral daily  glucagon  Injectable 1 milliGRAM(s) IntraMuscular once  influenza   Vaccine 0.5 milliLiter(s) IntraMuscular once  insulin lispro (ADMELOG) corrective regimen sliding scale   SubCutaneous three times a day before meals  magnesium oxide 400 milliGRAM(s) Oral daily  mupirocin 2% Ointment 1 Application(s) Topical two times a day  QUEtiapine 12.5 milliGRAM(s) Oral at bedtime  saccharomyces boulardii 250 milliGRAM(s) Oral two times a day  silver sulfADIAZINE 1% Cream 1 Application(s) Topical daily  sodium chloride 0.9%. 1000 milliLiter(s) (75 mL/Hr) IV Continuous <Continuous>    MEDICATIONS  (PRN):  acetaminophen   Tablet .. 650 milliGRAM(s) Oral every 6 hours PRN Severe Pain (7 - 10)  aluminum hydroxide/magnesium hydroxide/simethicone Suspension 30 milliLiter(s) Oral every 4 hours PRN Dyspepsia  melatonin 3 milliGRAM(s) Oral at bedtime PRN Insomnia  traMADol 50 milliGRAM(s) Oral three times a day PRN Severe Pain (7 - 10)        I&O's Summary      PHYSICAL EXAM:  Vital Signs Last 24 Hrs  T(C): 36.7 (08 Sep 2021 11:25), Max: 36.8 (07 Sep 2021 17:21)  T(F): 98 (08 Sep 2021 11:25), Max: 98.2 (07 Sep 2021 17:21)  HR: 71 (08 Sep 2021 11:25) (71 - 76)  BP: 105/51 (08 Sep 2021 11:25) (105/51 - 126/59)  BP(mean): --  RR: 18 (08 Sep 2021 11:25) (18 - 18)  SpO2: 93% (08 Sep 2021 11:25) (93% - 97%)        CONSTITUTIONAL: NAD, well-developed,   ENMT: Moist oral mucosa, no pharyngeal injection or exudates;   RESPIRATORY: Normal respiratory effort; lungs are clear to auscultation bilaterally  CARDIOVASCULAR: Regular rate and rhythm, normal S1 and S2, no murmur/rub/gallop;   ABDOMEN: Nontender to palpation, normoactive bowel sounds, no rebound/guarding;  MUSCLOSKELETAL:  left leg diffuse ecchymosis and scrapes  PSYCH: A+O to person, place, and time; affect appropriate  NEUROLOGY: CN 2-12 are intact and symmetric; no gross sensory deficits;   SKIN: No rashes; no palpable lesions    LABS:                        10.9   4.56  )-----------( 199      ( 08 Sep 2021 06:40 )             32.9     09-08    136  |  103  |  40.5<H>  ----------------------------<  122<H>  5.3   |  22.0  |  1.59<H>    Ca    10.1      08 Sep 2021 06:40  Mg     2.0     09-08      PT/INR - ( 08 Sep 2021 06:40 )   PT: 15.7 sec;   INR: 1.37 ratio                   CAPILLARY BLOOD GLUCOSE      POCT Blood Glucose.: 125 mg/dL (08 Sep 2021 11:24)  POCT Blood Glucose.: 130 mg/dL (08 Sep 2021 07:37)  POCT Blood Glucose.: 125 mg/dL (07 Sep 2021 21:32)  POCT Blood Glucose.: 132 mg/dL (07 Sep 2021 16:14)        RADIOLOGY & ADDITIONAL TESTS:  Results Reviewed:   Imaging Personally Reviewed:  Electrocardiogram Personally Reviewed:

## 2021-09-08 NOTE — PROGRESS NOTE ADULT - ASSESSMENT
89 M with extensive cardiac history including afib on coumadin who sustained traumatic injury to left lower extremity without fractures. Admitted 6 days after incident with surgery consult 8 days after incident for concern of LLE hematoma with cellulitis.   - Pending CT evaluation of lower extremity  - Keep elevated  - Warm compress to lower extremity  - Betadine to necrotic tissue to facilitate debridement  - Surgery will continue to follow    89 M with extensive cardiac history including afib on coumadin who sustained traumatic injury to left lower extremity without fractures. Admitted 6 days after incident with surgery consult 8 days after incident for concern of LLE hematoma with cellulitis.   - Pending CT evaluation of lower extremity  - Keep elevated  - Warm compress to lower extremity  - Betadine to necrotic tissue to facilitate debridement  - Surgery will continue to follow       Addendum: pt seen and examined today, recommend silvadene daily, no evidence of skin necrosis right now requiring operative debridement or drainage, continue local woundcare.

## 2021-09-09 LAB
ANION GAP SERPL CALC-SCNC: 10 MMOL/L — SIGNIFICANT CHANGE UP (ref 5–17)
BUN SERPL-MCNC: 45 MG/DL — HIGH (ref 8–20)
CALCIUM SERPL-MCNC: 9.2 MG/DL — SIGNIFICANT CHANGE UP (ref 8.6–10.2)
CHLORIDE SERPL-SCNC: 99 MMOL/L — SIGNIFICANT CHANGE UP (ref 98–107)
CO2 SERPL-SCNC: 23 MMOL/L — SIGNIFICANT CHANGE UP (ref 22–29)
CREAT SERPL-MCNC: 1.75 MG/DL — HIGH (ref 0.5–1.3)
CULTURE RESULTS: SIGNIFICANT CHANGE UP
CULTURE RESULTS: SIGNIFICANT CHANGE UP
GLUCOSE BLDC GLUCOMTR-MCNC: 115 MG/DL — HIGH (ref 70–99)
GLUCOSE BLDC GLUCOMTR-MCNC: 117 MG/DL — HIGH (ref 70–99)
GLUCOSE BLDC GLUCOMTR-MCNC: 120 MG/DL — HIGH (ref 70–99)
GLUCOSE BLDC GLUCOMTR-MCNC: 135 MG/DL — HIGH (ref 70–99)
GLUCOSE SERPL-MCNC: 124 MG/DL — HIGH (ref 70–99)
HCT VFR BLD CALC: 30 % — LOW (ref 39–50)
HGB BLD-MCNC: 9.9 G/DL — LOW (ref 13–17)
MCHC RBC-ENTMCNC: 30 PG — SIGNIFICANT CHANGE UP (ref 27–34)
MCHC RBC-ENTMCNC: 33 GM/DL — SIGNIFICANT CHANGE UP (ref 32–36)
MCV RBC AUTO: 90.9 FL — SIGNIFICANT CHANGE UP (ref 80–100)
PLATELET # BLD AUTO: 193 K/UL — SIGNIFICANT CHANGE UP (ref 150–400)
POTASSIUM SERPL-MCNC: 5.3 MMOL/L — SIGNIFICANT CHANGE UP (ref 3.5–5.3)
POTASSIUM SERPL-SCNC: 5.3 MMOL/L — SIGNIFICANT CHANGE UP (ref 3.5–5.3)
RBC # BLD: 3.3 M/UL — LOW (ref 4.2–5.8)
RBC # FLD: 13.6 % — SIGNIFICANT CHANGE UP (ref 10.3–14.5)
SARS-COV-2 RNA SPEC QL NAA+PROBE: SIGNIFICANT CHANGE UP
SODIUM SERPL-SCNC: 132 MMOL/L — LOW (ref 135–145)
SPECIMEN SOURCE: SIGNIFICANT CHANGE UP
SPECIMEN SOURCE: SIGNIFICANT CHANGE UP
WBC # BLD: 4.1 K/UL — SIGNIFICANT CHANGE UP (ref 3.8–10.5)
WBC # FLD AUTO: 4.1 K/UL — SIGNIFICANT CHANGE UP (ref 3.8–10.5)

## 2021-09-09 PROCEDURE — 99233 SBSQ HOSP IP/OBS HIGH 50: CPT

## 2021-09-09 RX ORDER — CEPHALEXIN 500 MG
500 CAPSULE ORAL THREE TIMES A DAY
Refills: 0 | Status: DISCONTINUED | OUTPATIENT
Start: 2021-09-09 | End: 2021-09-10

## 2021-09-09 RX ORDER — CEPHALEXIN 500 MG
1 CAPSULE ORAL
Qty: 0 | Refills: 0 | DISCHARGE
Start: 2021-09-09

## 2021-09-09 RX ORDER — CEPHALEXIN 500 MG
500 CAPSULE ORAL
Refills: 0 | Status: DISCONTINUED | OUTPATIENT
Start: 2021-09-09 | End: 2021-09-09

## 2021-09-09 RX ORDER — SODIUM CHLORIDE 9 MG/ML
1000 INJECTION INTRAMUSCULAR; INTRAVENOUS; SUBCUTANEOUS
Refills: 0 | Status: DISCONTINUED | OUTPATIENT
Start: 2021-09-09 | End: 2021-09-10

## 2021-09-09 RX ADMIN — ATORVASTATIN CALCIUM 40 MILLIGRAM(S): 80 TABLET, FILM COATED ORAL at 22:49

## 2021-09-09 RX ADMIN — TRAMADOL HYDROCHLORIDE 50 MILLIGRAM(S): 50 TABLET ORAL at 13:31

## 2021-09-09 RX ADMIN — Medication 100 MILLIGRAM(S): at 04:58

## 2021-09-09 RX ADMIN — TRAMADOL HYDROCHLORIDE 50 MILLIGRAM(S): 50 TABLET ORAL at 16:27

## 2021-09-09 RX ADMIN — CARVEDILOL PHOSPHATE 12.5 MILLIGRAM(S): 80 CAPSULE, EXTENDED RELEASE ORAL at 17:26

## 2021-09-09 RX ADMIN — Medication 1 APPLICATION(S): at 11:17

## 2021-09-09 RX ADMIN — CARVEDILOL PHOSPHATE 12.5 MILLIGRAM(S): 80 CAPSULE, EXTENDED RELEASE ORAL at 04:58

## 2021-09-09 RX ADMIN — TRAMADOL HYDROCHLORIDE 50 MILLIGRAM(S): 50 TABLET ORAL at 22:49

## 2021-09-09 RX ADMIN — Medication 250 MILLIGRAM(S): at 04:58

## 2021-09-09 RX ADMIN — TRAMADOL HYDROCHLORIDE 50 MILLIGRAM(S): 50 TABLET ORAL at 17:34

## 2021-09-09 RX ADMIN — QUETIAPINE FUMARATE 12.5 MILLIGRAM(S): 200 TABLET, FILM COATED ORAL at 22:49

## 2021-09-09 RX ADMIN — MUPIROCIN 1 APPLICATION(S): 20 OINTMENT TOPICAL at 04:58

## 2021-09-09 RX ADMIN — TRAMADOL HYDROCHLORIDE 50 MILLIGRAM(S): 50 TABLET ORAL at 23:49

## 2021-09-09 RX ADMIN — Medication 500 MILLIGRAM(S): at 22:49

## 2021-09-09 RX ADMIN — SODIUM CHLORIDE 75 MILLILITER(S): 9 INJECTION INTRAMUSCULAR; INTRAVENOUS; SUBCUTANEOUS at 11:17

## 2021-09-09 RX ADMIN — Medication 1 MILLIGRAM(S): at 11:17

## 2021-09-09 RX ADMIN — CLOPIDOGREL BISULFATE 75 MILLIGRAM(S): 75 TABLET, FILM COATED ORAL at 11:17

## 2021-09-09 RX ADMIN — MAGNESIUM OXIDE 400 MG ORAL TABLET 400 MILLIGRAM(S): 241.3 TABLET ORAL at 11:17

## 2021-09-09 RX ADMIN — Medication 145 MILLIGRAM(S): at 11:17

## 2021-09-09 RX ADMIN — Medication 500 MILLIGRAM(S): at 13:24

## 2021-09-09 RX ADMIN — Medication 250 MILLIGRAM(S): at 17:26

## 2021-09-09 RX ADMIN — Medication 300 MILLIGRAM(S): at 11:17

## 2021-09-09 NOTE — PROGRESS NOTE ADULT - SUBJECTIVE AND OBJECTIVE BOX
[facility] Division of Hospital Medicine    Chief Complaint:      SUBJECTIVE / OVERNIGHT EVENTS:    Patient denies chest pain, SOB, abd pain, N/V, fever, chills, dysuria or any other complaints. All remainder ROS negative.     MEDICATIONS  (STANDING):  allopurinol 300 milliGRAM(s) Oral daily  atorvastatin 40 milliGRAM(s) Oral at bedtime  carvedilol 12.5 milliGRAM(s) Oral every 12 hours  ceFAZolin   IVPB 2000 milliGRAM(s) IV Intermittent every 8 hours  clopidogrel Tablet 75 milliGRAM(s) Oral daily  dextrose 40% Gel 15 Gram(s) Oral once  dextrose 5%. 1000 milliLiter(s) (50 mL/Hr) IV Continuous <Continuous>  dextrose 5%. 1000 milliLiter(s) (100 mL/Hr) IV Continuous <Continuous>  dextrose 50% Injectable 25 Gram(s) IV Push once  dextrose 50% Injectable 12.5 Gram(s) IV Push once  dextrose 50% Injectable 25 Gram(s) IV Push once  fenofibrate Tablet 145 milliGRAM(s) Oral daily  folic acid 1 milliGRAM(s) Oral daily  glucagon  Injectable 1 milliGRAM(s) IntraMuscular once  influenza   Vaccine 0.5 milliLiter(s) IntraMuscular once  insulin lispro (ADMELOG) corrective regimen sliding scale   SubCutaneous three times a day before meals  magnesium oxide 400 milliGRAM(s) Oral daily  mupirocin 2% Ointment 1 Application(s) Topical two times a day  QUEtiapine 12.5 milliGRAM(s) Oral at bedtime  saccharomyces boulardii 250 milliGRAM(s) Oral two times a day  silver sulfADIAZINE 1% Cream 1 Application(s) Topical daily  sodium chloride 0.9%. 1000 milliLiter(s) (75 mL/Hr) IV Continuous <Continuous>    MEDICATIONS  (PRN):  acetaminophen   Tablet .. 650 milliGRAM(s) Oral every 6 hours PRN Severe Pain (7 - 10)  aluminum hydroxide/magnesium hydroxide/simethicone Suspension 30 milliLiter(s) Oral every 4 hours PRN Dyspepsia  melatonin 3 milliGRAM(s) Oral at bedtime PRN Insomnia  traMADol 50 milliGRAM(s) Oral three times a day PRN Severe Pain (7 - 10)        I&O's Summary      PHYSICAL EXAM:  Vital Signs Last 24 Hrs  T(C): 37.5 (09 Sep 2021 04:49), Max: 37.5 (09 Sep 2021 04:49)  T(F): 99.5 (09 Sep 2021 04:49), Max: 99.5 (09 Sep 2021 04:49)  HR: 85 (09 Sep 2021 04:49) (71 - 85)  BP: 113/59 (09 Sep 2021 04:49) (105/51 - 113/59)  BP(mean): --  RR: 18 (09 Sep 2021 04:49) (18 - 18)  SpO2: 94% (09 Sep 2021 04:49) (93% - 94%)        CONSTITUTIONAL: NAD, well-developed, well-groomed  ENMT: Moist oral mucosa, no pharyngeal injection or exudates; normal dentition  RESPIRATORY: Normal respiratory effort; lungs are clear to auscultation bilaterally  CARDIOVASCULAR: Regular rate and rhythm, normal S1 and S2, no murmur/rub/gallop; No lower extremity edema; Peripheral pulses are 2+ bilaterally  ABDOMEN: Nontender to palpation, normoactive bowel sounds, no rebound/guarding; No hepatosplenomegaly  MUSCLOSKELETAL:  Normal gait; no clubbing or cyanosis of digits; no joint swelling or tenderness to palpation  PSYCH: A+O to person, place, and time; affect appropriate  NEUROLOGY: CN 2-12 are intact and symmetric; no gross sensory deficits;   SKIN: No rashes; no palpable lesions    LABS:                        9.9    4.10  )-----------( 193      ( 09 Sep 2021 07:15 )             30.0     09-09    132<L>  |  99  |  45.0<H>  ----------------------------<  124<H>  5.3   |  23.0  |  1.75<H>    Ca    9.2      09 Sep 2021 07:15  Mg     2.0     09-08      PT/INR - ( 08 Sep 2021 06:40 )   PT: 15.7 sec;   INR: 1.37 ratio                   CAPILLARY BLOOD GLUCOSE      POCT Blood Glucose.: 135 mg/dL (09 Sep 2021 07:31)  POCT Blood Glucose.: 131 mg/dL (08 Sep 2021 21:00)  POCT Blood Glucose.: 129 mg/dL (08 Sep 2021 16:00)  POCT Blood Glucose.: 125 mg/dL (08 Sep 2021 11:24)        RADIOLOGY & ADDITIONAL TESTS:  Results Reviewed:   Imaging Personally Reviewed:  Electrocardiogram Personally Reviewed:                                           [facility] Division of Hospital Medicine    Chief Complaint:  Left leg pain    SUBJECTIVE / OVERNIGHT EVENTS: Patient seen and examined. Still with leg pain. Reports he is urinating without issues.     Patient denies chest pain, SOB, abd pain, N/V, fever, chills, dysuria or any other complaints. All remainder ROS negative.     MEDICATIONS  (STANDING):  allopurinol 300 milliGRAM(s) Oral daily  atorvastatin 40 milliGRAM(s) Oral at bedtime  carvedilol 12.5 milliGRAM(s) Oral every 12 hours  ceFAZolin   IVPB 2000 milliGRAM(s) IV Intermittent every 8 hours  clopidogrel Tablet 75 milliGRAM(s) Oral daily  dextrose 40% Gel 15 Gram(s) Oral once  dextrose 5%. 1000 milliLiter(s) (50 mL/Hr) IV Continuous <Continuous>  dextrose 5%. 1000 milliLiter(s) (100 mL/Hr) IV Continuous <Continuous>  dextrose 50% Injectable 25 Gram(s) IV Push once  dextrose 50% Injectable 12.5 Gram(s) IV Push once  dextrose 50% Injectable 25 Gram(s) IV Push once  fenofibrate Tablet 145 milliGRAM(s) Oral daily  folic acid 1 milliGRAM(s) Oral daily  glucagon  Injectable 1 milliGRAM(s) IntraMuscular once  influenza   Vaccine 0.5 milliLiter(s) IntraMuscular once  insulin lispro (ADMELOG) corrective regimen sliding scale   SubCutaneous three times a day before meals  magnesium oxide 400 milliGRAM(s) Oral daily  mupirocin 2% Ointment 1 Application(s) Topical two times a day  QUEtiapine 12.5 milliGRAM(s) Oral at bedtime  saccharomyces boulardii 250 milliGRAM(s) Oral two times a day  silver sulfADIAZINE 1% Cream 1 Application(s) Topical daily  sodium chloride 0.9%. 1000 milliLiter(s) (75 mL/Hr) IV Continuous <Continuous>    MEDICATIONS  (PRN):  acetaminophen   Tablet .. 650 milliGRAM(s) Oral every 6 hours PRN Severe Pain (7 - 10)  aluminum hydroxide/magnesium hydroxide/simethicone Suspension 30 milliLiter(s) Oral every 4 hours PRN Dyspepsia  melatonin 3 milliGRAM(s) Oral at bedtime PRN Insomnia  traMADol 50 milliGRAM(s) Oral three times a day PRN Severe Pain (7 - 10)        I&O's Summary      PHYSICAL EXAM:  Vital Signs Last 24 Hrs  T(C): 37.5 (09 Sep 2021 04:49), Max: 37.5 (09 Sep 2021 04:49)  T(F): 99.5 (09 Sep 2021 04:49), Max: 99.5 (09 Sep 2021 04:49)  HR: 85 (09 Sep 2021 04:49) (71 - 85)  BP: 113/59 (09 Sep 2021 04:49) (105/51 - 113/59)  BP(mean): --  RR: 18 (09 Sep 2021 04:49) (18 - 18)  SpO2: 94% (09 Sep 2021 04:49) (93% - 94%)      CONSTITUTIONAL: NAD, well-developed,   ENMT: Moist oral mucosa, no pharyngeal injection or exudates;   RESPIRATORY: Normal respiratory effort; lungs are clear to auscultation bilaterally  CARDIOVASCULAR: Regular rate and rhythm, normal S1 and S2, no murmur/rub/gallop;   ABDOMEN: Nontender to palpation, normoactive bowel sounds, no rebound/guarding;  MUSCLOSKELETAL:  left leg diffuse ecchymosis and scrapes  PSYCH: A+O to person, place, and time; affect appropriate  NEUROLOGY: CN 2-12 are intact and symmetric; no gross sensory deficits;   SKIN: No rashes; no palpable lesions    LABS:                        9.9    4.10  )-----------( 193      ( 09 Sep 2021 07:15 )             30.0     09-09    132<L>  |  99  |  45.0<H>  ----------------------------<  124<H>  5.3   |  23.0  |  1.75<H>    Ca    9.2      09 Sep 2021 07:15  Mg     2.0     09-08      PT/INR - ( 08 Sep 2021 06:40 )   PT: 15.7 sec;   INR: 1.37 ratio                   CAPILLARY BLOOD GLUCOSE      POCT Blood Glucose.: 135 mg/dL (09 Sep 2021 07:31)  POCT Blood Glucose.: 131 mg/dL (08 Sep 2021 21:00)  POCT Blood Glucose.: 129 mg/dL (08 Sep 2021 16:00)  POCT Blood Glucose.: 125 mg/dL (08 Sep 2021 11:24)        RADIOLOGY & ADDITIONAL TESTS:  Results Reviewed:   Imaging Personally Reviewed:  Electrocardiogram Personally Reviewed:
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
## 2021-09-09 NOTE — PROGRESS NOTE ADULT - SUBJECTIVE AND OBJECTIVE BOX
INTERVAL HPI/OVERNIGHT EVENTS:  Patient was seen and examined at bedside this AM.  No acute events overnight. No new pain drainage or skin changes on LLE wound        MEDICATIONS  (STANDING):  allopurinol 300 milliGRAM(s) Oral daily  atorvastatin 40 milliGRAM(s) Oral at bedtime  carvedilol 12.5 milliGRAM(s) Oral every 12 hours  ceFAZolin   IVPB 2000 milliGRAM(s) IV Intermittent every 8 hours  clopidogrel Tablet 75 milliGRAM(s) Oral daily  dextrose 40% Gel 15 Gram(s) Oral once  dextrose 5%. 1000 milliLiter(s) (50 mL/Hr) IV Continuous <Continuous>  dextrose 5%. 1000 milliLiter(s) (100 mL/Hr) IV Continuous <Continuous>  dextrose 50% Injectable 25 Gram(s) IV Push once  dextrose 50% Injectable 12.5 Gram(s) IV Push once  dextrose 50% Injectable 25 Gram(s) IV Push once  fenofibrate Tablet 145 milliGRAM(s) Oral daily  folic acid 1 milliGRAM(s) Oral daily  glucagon  Injectable 1 milliGRAM(s) IntraMuscular once  influenza   Vaccine 0.5 milliLiter(s) IntraMuscular once  insulin lispro (ADMELOG) corrective regimen sliding scale   SubCutaneous three times a day before meals  magnesium oxide 400 milliGRAM(s) Oral daily  mupirocin 2% Ointment 1 Application(s) Topical two times a day  QUEtiapine 12.5 milliGRAM(s) Oral at bedtime  saccharomyces boulardii 250 milliGRAM(s) Oral two times a day  silver sulfADIAZINE 1% Cream 1 Application(s) Topical daily  sodium chloride 0.9%. 1000 milliLiter(s) (75 mL/Hr) IV Continuous <Continuous>    MEDICATIONS  (PRN):  acetaminophen   Tablet .. 650 milliGRAM(s) Oral every 6 hours PRN Severe Pain (7 - 10)  aluminum hydroxide/magnesium hydroxide/simethicone Suspension 30 milliLiter(s) Oral every 4 hours PRN Dyspepsia  melatonin 3 milliGRAM(s) Oral at bedtime PRN Insomnia  traMADol 50 milliGRAM(s) Oral three times a day PRN Severe Pain (7 - 10)      Vital Signs Last 24 Hrs  T(C): 36.7 (08 Sep 2021 17:53), Max: 36.7 (08 Sep 2021 04:39)  T(F): 98 (08 Sep 2021 17:53), Max: 98 (08 Sep 2021 04:39)  HR: 76 (08 Sep 2021 17:53) (71 - 76)  BP: 107/- (08 Sep 2021 17:53) (105/51 - 121/51)  BP(mean): --  RR: 18 (08 Sep 2021 17:53) (18 - 18)  SpO2: 93% (08 Sep 2021 17:53) (93% - 95%)    Physical Exam:  GEN: NAD, laying in bed, appears stated age  HEENT: NCAT, clear oral mucosa, normal conjunctiva  Chest: non-tender  CV:  non-tachycardic, 2+ radial pulse  Pulm: no increased work of breathing, no conversational dyspnea  GI: soft,non tender  MSK: moving all extremities, LLE with necrotic skin changes to superficial tissue. tender to palpation with marked ecchymosis. Area feels tense, but posterior compartments remain soft.   2+ distal pulses        I&O's Detail      LABS:                        10.9   4.56  )-----------( 199      ( 08 Sep 2021 06:40 )             32.9     09-08    136  |  103  |  40.5<H>  ----------------------------<  122<H>  5.3   |  22.0  |  1.59<H>    Ca    10.1      08 Sep 2021 06:40  Mg     2.0     09-08      PT/INR - ( 08 Sep 2021 06:40 )   PT: 15.7 sec;   INR: 1.37 ratio               RADIOLOGY & ADDITIONAL STUDIES:

## 2021-09-09 NOTE — PROGRESS NOTE ADULT - ASSESSMENT
89 M with extensive cardiac history including afib on coumadin who sustained traumatic injury to left lower extremity without fractures. Admitted 6 days after incident with surgery consult 8 days after incident for concern of LLE hematoma with cellulitis.   - Keep elevated  - Warm compress to lower extremity  - daily silvadene with gauze on top   89 M with extensive cardiac history including afib on coumadin who sustained traumatic injury to left lower extremity without fractures. Admitted 6 days after incident with surgery consult 8 days after incident for concern of LLE hematoma with cellulitis.   - Keep elevated  - Warm compress to lower extremity  - daily silvadene with gauze on top    Addendum:    No surgical intervention needed at this time  Continue daily dressing changes as described above.    Surgery will sign off at this time  Thank you for the consult

## 2021-09-10 ENCOUNTER — TRANSCRIPTION ENCOUNTER (OUTPATIENT)
Age: 86
End: 2021-09-10

## 2021-09-10 VITALS
RESPIRATION RATE: 18 BRPM | SYSTOLIC BLOOD PRESSURE: 127 MMHG | OXYGEN SATURATION: 96 % | DIASTOLIC BLOOD PRESSURE: 64 MMHG | TEMPERATURE: 98 F | HEART RATE: 78 BPM

## 2021-09-10 LAB
ANION GAP SERPL CALC-SCNC: 11 MMOL/L — SIGNIFICANT CHANGE UP (ref 5–17)
BUN SERPL-MCNC: 39.5 MG/DL — HIGH (ref 8–20)
CALCIUM SERPL-MCNC: 9.6 MG/DL — SIGNIFICANT CHANGE UP (ref 8.6–10.2)
CHLORIDE SERPL-SCNC: 104 MMOL/L — SIGNIFICANT CHANGE UP (ref 98–107)
CO2 SERPL-SCNC: 21 MMOL/L — LOW (ref 22–29)
CREAT SERPL-MCNC: 1.44 MG/DL — HIGH (ref 0.5–1.3)
GLUCOSE BLDC GLUCOMTR-MCNC: 104 MG/DL — HIGH (ref 70–99)
GLUCOSE BLDC GLUCOMTR-MCNC: 108 MG/DL — HIGH (ref 70–99)
GLUCOSE BLDC GLUCOMTR-MCNC: 113 MG/DL — HIGH (ref 70–99)
GLUCOSE SERPL-MCNC: 99 MG/DL — SIGNIFICANT CHANGE UP (ref 70–99)
HCT VFR BLD CALC: 30.4 % — LOW (ref 39–50)
HGB BLD-MCNC: 10 G/DL — LOW (ref 13–17)
MAGNESIUM SERPL-MCNC: 1.9 MG/DL — SIGNIFICANT CHANGE UP (ref 1.6–2.6)
MCHC RBC-ENTMCNC: 30.1 PG — SIGNIFICANT CHANGE UP (ref 27–34)
MCHC RBC-ENTMCNC: 32.9 GM/DL — SIGNIFICANT CHANGE UP (ref 32–36)
MCV RBC AUTO: 91.6 FL — SIGNIFICANT CHANGE UP (ref 80–100)
PLATELET # BLD AUTO: 201 K/UL — SIGNIFICANT CHANGE UP (ref 150–400)
POTASSIUM SERPL-MCNC: 5.2 MMOL/L — SIGNIFICANT CHANGE UP (ref 3.5–5.3)
POTASSIUM SERPL-SCNC: 5.2 MMOL/L — SIGNIFICANT CHANGE UP (ref 3.5–5.3)
RBC # BLD: 3.32 M/UL — LOW (ref 4.2–5.8)
RBC # FLD: 13.6 % — SIGNIFICANT CHANGE UP (ref 10.3–14.5)
SODIUM SERPL-SCNC: 136 MMOL/L — SIGNIFICANT CHANGE UP (ref 135–145)
WBC # BLD: 3.95 K/UL — SIGNIFICANT CHANGE UP (ref 3.8–10.5)
WBC # FLD AUTO: 3.95 K/UL — SIGNIFICANT CHANGE UP (ref 3.8–10.5)

## 2021-09-10 PROCEDURE — 99285 EMERGENCY DEPT VISIT HI MDM: CPT

## 2021-09-10 PROCEDURE — 82550 ASSAY OF CK (CPK): CPT

## 2021-09-10 PROCEDURE — 86769 SARS-COV-2 COVID-19 ANTIBODY: CPT

## 2021-09-10 PROCEDURE — 97530 THERAPEUTIC ACTIVITIES: CPT

## 2021-09-10 PROCEDURE — 85025 COMPLETE CBC W/AUTO DIFF WBC: CPT

## 2021-09-10 PROCEDURE — 85730 THROMBOPLASTIN TIME PARTIAL: CPT

## 2021-09-10 PROCEDURE — 99239 HOSP IP/OBS DSCHRG MGMT >30: CPT

## 2021-09-10 PROCEDURE — 83735 ASSAY OF MAGNESIUM: CPT

## 2021-09-10 PROCEDURE — 36415 COLL VENOUS BLD VENIPUNCTURE: CPT

## 2021-09-10 PROCEDURE — 83036 HEMOGLOBIN GLYCOSYLATED A1C: CPT

## 2021-09-10 PROCEDURE — 97116 GAIT TRAINING THERAPY: CPT

## 2021-09-10 PROCEDURE — U0005: CPT

## 2021-09-10 PROCEDURE — 73701 CT LOWER EXTREMITY W/DYE: CPT

## 2021-09-10 PROCEDURE — 85610 PROTHROMBIN TIME: CPT

## 2021-09-10 PROCEDURE — 80048 BASIC METABOLIC PNL TOTAL CA: CPT

## 2021-09-10 PROCEDURE — 82962 GLUCOSE BLOOD TEST: CPT

## 2021-09-10 PROCEDURE — 96374 THER/PROPH/DIAG INJ IV PUSH: CPT

## 2021-09-10 PROCEDURE — 84484 ASSAY OF TROPONIN QUANT: CPT

## 2021-09-10 PROCEDURE — 93971 EXTREMITY STUDY: CPT

## 2021-09-10 PROCEDURE — 96361 HYDRATE IV INFUSION ADD-ON: CPT

## 2021-09-10 PROCEDURE — 87040 BLOOD CULTURE FOR BACTERIA: CPT

## 2021-09-10 PROCEDURE — 97163 PT EVAL HIGH COMPLEX 45 MIN: CPT

## 2021-09-10 PROCEDURE — 85027 COMPLETE CBC AUTOMATED: CPT

## 2021-09-10 PROCEDURE — 83605 ASSAY OF LACTIC ACID: CPT

## 2021-09-10 PROCEDURE — U0003: CPT

## 2021-09-10 PROCEDURE — 80053 COMPREHEN METABOLIC PANEL: CPT

## 2021-09-10 PROCEDURE — 93005 ELECTROCARDIOGRAM TRACING: CPT

## 2021-09-10 RX ORDER — SACUBITRIL AND VALSARTAN 24; 26 MG/1; MG/1
1 TABLET, FILM COATED ORAL
Qty: 0 | Refills: 0 | DISCHARGE

## 2021-09-10 RX ORDER — WARFARIN SODIUM 2.5 MG/1
1 TABLET ORAL
Qty: 0 | Refills: 0 | DISCHARGE

## 2021-09-10 RX ORDER — FUROSEMIDE 40 MG
20 TABLET ORAL DAILY
Refills: 0 | Status: DISCONTINUED | OUTPATIENT
Start: 2021-09-11 | End: 2021-09-10

## 2021-09-10 RX ORDER — FUROSEMIDE 40 MG
1 TABLET ORAL
Qty: 0 | Refills: 0 | DISCHARGE
Start: 2021-09-10

## 2021-09-10 RX ADMIN — CARVEDILOL PHOSPHATE 12.5 MILLIGRAM(S): 80 CAPSULE, EXTENDED RELEASE ORAL at 17:06

## 2021-09-10 RX ADMIN — MAGNESIUM OXIDE 400 MG ORAL TABLET 400 MILLIGRAM(S): 241.3 TABLET ORAL at 11:11

## 2021-09-10 RX ADMIN — CLOPIDOGREL BISULFATE 75 MILLIGRAM(S): 75 TABLET, FILM COATED ORAL at 11:11

## 2021-09-10 RX ADMIN — CARVEDILOL PHOSPHATE 12.5 MILLIGRAM(S): 80 CAPSULE, EXTENDED RELEASE ORAL at 07:05

## 2021-09-10 RX ADMIN — Medication 300 MILLIGRAM(S): at 11:11

## 2021-09-10 RX ADMIN — Medication 1 APPLICATION(S): at 11:12

## 2021-09-10 RX ADMIN — Medication 500 MILLIGRAM(S): at 07:05

## 2021-09-10 RX ADMIN — TRAMADOL HYDROCHLORIDE 50 MILLIGRAM(S): 50 TABLET ORAL at 20:10

## 2021-09-10 RX ADMIN — Medication 250 MILLIGRAM(S): at 17:10

## 2021-09-10 RX ADMIN — Medication 1 MILLIGRAM(S): at 11:11

## 2021-09-10 RX ADMIN — Medication 145 MILLIGRAM(S): at 11:11

## 2021-09-10 RX ADMIN — Medication 250 MILLIGRAM(S): at 07:06

## 2021-09-10 RX ADMIN — Medication 500 MILLIGRAM(S): at 15:02

## 2021-09-10 NOTE — DIETITIAN INITIAL EVALUATION ADULT. - OTHER INFO
88 y/o M w/ PMH of CAD s/p MI (1973) and PCI w/ ELOY x2 in 2019, CABG (1996), HFrEF (ischemic CM w/ EF 35% 2019) s/p [employer] ICD, Afib (on coumadin), CVA, HTN, HLD, and DMII, liver failure, opioid addiction (in recovery since 2017),  present s/p leg injury on Monday w/subsequent development of LLE cellulitis. Pt has significant bruising to LE has remained stable. PT evaluated Pt and is recommending IVORY.

## 2021-09-10 NOTE — PROGRESS NOTE ADULT - SUBJECTIVE AND OBJECTIVE BOX
[facility] Division of Hospital Medicine    Chief Complaint:  left leg pain    SUBJECTIVE / OVERNIGHT EVENTS: patient seen and examined, States he is feeling better. No complaints    Patient denies chest pain, SOB, abd pain, N/V, fever, chills, dysuria or any other complaints. All remainder ROS negative.     MEDICATIONS  (STANDING):  allopurinol 300 milliGRAM(s) Oral daily  atorvastatin 40 milliGRAM(s) Oral at bedtime  carvedilol 12.5 milliGRAM(s) Oral every 12 hours  cephalexin 500 milliGRAM(s) Oral three times a day  clopidogrel Tablet 75 milliGRAM(s) Oral daily  dextrose 40% Gel 15 Gram(s) Oral once  dextrose 5%. 1000 milliLiter(s) (50 mL/Hr) IV Continuous <Continuous>  dextrose 5%. 1000 milliLiter(s) (100 mL/Hr) IV Continuous <Continuous>  dextrose 50% Injectable 25 Gram(s) IV Push once  dextrose 50% Injectable 12.5 Gram(s) IV Push once  dextrose 50% Injectable 25 Gram(s) IV Push once  fenofibrate Tablet 145 milliGRAM(s) Oral daily  folic acid 1 milliGRAM(s) Oral daily  glucagon  Injectable 1 milliGRAM(s) IntraMuscular once  influenza   Vaccine 0.5 milliLiter(s) IntraMuscular once  insulin lispro (ADMELOG) corrective regimen sliding scale   SubCutaneous three times a day before meals  magnesium oxide 400 milliGRAM(s) Oral daily  QUEtiapine 12.5 milliGRAM(s) Oral at bedtime  saccharomyces boulardii 250 milliGRAM(s) Oral two times a day  silver sulfADIAZINE 1% Cream 1 Application(s) Topical daily  sodium chloride 0.9%. 1000 milliLiter(s) (75 mL/Hr) IV Continuous <Continuous>    MEDICATIONS  (PRN):  acetaminophen   Tablet .. 650 milliGRAM(s) Oral every 6 hours PRN Severe Pain (7 - 10)  aluminum hydroxide/magnesium hydroxide/simethicone Suspension 30 milliLiter(s) Oral every 4 hours PRN Dyspepsia  melatonin 3 milliGRAM(s) Oral at bedtime PRN Insomnia  traMADol 50 milliGRAM(s) Oral three times a day PRN Severe Pain (7 - 10)        I&O's Summary      PHYSICAL EXAM:  Vital Signs Last 24 Hrs  T(C): 36.8 (10 Sep 2021 09:43), Max: 37 (10 Sep 2021 04:46)  T(F): 98.2 (10 Sep 2021 09:43), Max: 98.6 (10 Sep 2021 04:46)  HR: 73 (10 Sep 2021 09:43) (73 - 85)  BP: 105/58 (10 Sep 2021 09:43) (105/58 - 133/61)  BP(mean): --  RR: 18 (10 Sep 2021 09:43) (18 - 18)  SpO2: 95% (10 Sep 2021 09:43) (94% - 95%)      CONSTITUTIONAL: NAD, well-developed,   ENMT: Moist oral mucosa, no pharyngeal injection or exudates;   RESPIRATORY: Normal respiratory effort; lungs are clear to auscultation bilaterally  CARDIOVASCULAR: Regular rate and rhythm, normal S1 and S2,   ABDOMEN: Nontender to palpation, normoactive bowel sounds, no rebound/guarding;  MUSCLOSKELETAL:  left leg diffuse ecchymosis and scrapes  PSYCH: A+O to person, place, and time; affect appropriate  NEUROLOGY: CN 2-12 are intact and symmetric; no gross sensory deficits;   SKIN: No rashes; no palpable lesions    LABS:                        10.0   3.95  )-----------( 201      ( 10 Sep 2021 06:23 )             30.4     09-10    136  |  104  |  39.5<H>  ----------------------------<  99  5.2   |  21.0<L>  |  1.44<H>    Ca    9.6      10 Sep 2021 06:23  Mg     1.9     09-10                CAPILLARY BLOOD GLUCOSE      POCT Blood Glucose.: 113 mg/dL (10 Sep 2021 10:52)  POCT Blood Glucose.: 104 mg/dL (10 Sep 2021 07:33)  POCT Blood Glucose.: 115 mg/dL (09 Sep 2021 22:48)  POCT Blood Glucose.: 120 mg/dL (09 Sep 2021 16:28)        RADIOLOGY & ADDITIONAL TESTS:  Results Reviewed:   Imaging Personally Reviewed:  Electrocardiogram Personally Reviewed:

## 2021-09-10 NOTE — PROGRESS NOTE ADULT - REASON FOR ADMISSION
LLE cellulitis

## 2021-09-10 NOTE — DISCHARGE NOTE NURSING/CASE MANAGEMENT/SOCIAL WORK - PATIENT PORTAL LINK FT
You can access the FollowMyHealth Patient Portal offered by [facility] by registering at the following website: [email or web address]. By joining [place] [facility]’s FollowMyHealth portal, you will also be able to view your health information using other applications (apps) compatible with our system.
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
## 2021-09-12 ENCOUNTER — RX RENEWAL (OUTPATIENT)
Age: 86
End: 2021-09-12

## 2021-09-14 ENCOUNTER — RX RENEWAL (OUTPATIENT)
Age: 86
End: 2021-09-14

## 2021-09-14 ENCOUNTER — NON-APPOINTMENT (OUTPATIENT)
Age: 86
End: 2021-09-14

## 2021-09-16 ENCOUNTER — RX RENEWAL (OUTPATIENT)
Age: 86
End: 2021-09-16

## 2021-09-17 ENCOUNTER — APPOINTMENT (OUTPATIENT)
Dept: CARDIOLOGY | Facility: CLINIC | Age: 86
End: 2021-09-17

## 2021-09-20 ENCOUNTER — APPOINTMENT (OUTPATIENT)
Dept: FAMILY MEDICINE | Facility: CLINIC | Age: 86
End: 2021-09-20
Payer: MEDICARE

## 2021-09-20 VITALS
BODY MASS INDEX: 27.25 KG/M2 | DIASTOLIC BLOOD PRESSURE: 52 MMHG | HEIGHT: 69 IN | SYSTOLIC BLOOD PRESSURE: 142 MMHG | OXYGEN SATURATION: 99 % | WEIGHT: 184 LBS | TEMPERATURE: 97.1 F | HEART RATE: 69 BPM

## 2021-09-20 DIAGNOSIS — M79.605 PAIN IN LEFT LEG: ICD-10-CM

## 2021-09-20 PROBLEM — Z87.19 PERSONAL HISTORY OF OTHER DISEASES OF THE DIGESTIVE SYSTEM: Chronic | Status: ACTIVE | Noted: 2021-09-04

## 2021-09-20 PROBLEM — F11.10 OPIOID ABUSE, UNCOMPLICATED: Chronic | Status: ACTIVE | Noted: 2021-09-04

## 2021-09-20 PROCEDURE — 99214 OFFICE O/P EST MOD 30 MIN: CPT | Mod: 25

## 2021-09-20 PROCEDURE — 85610 PROTHROMBIN TIME: CPT | Mod: QW

## 2021-09-22 LAB — INR PPP: 1.5 RATIO

## 2021-09-22 NOTE — PHYSICAL EXAM
[Normal] : affect was normal and insight and judgment were intact [de-identified] : irregularly irregular [de-identified] : LLE tenderness to palpation, minimal erythema

## 2021-09-22 NOTE — ASSESSMENT
[FreeTextEntry1] : LLE pain s/p cellulitis - topical diclofenac. percocet prn severe breakthrough pain.\par afib - subtherapeutic today as pt only restarted warfarin a few days ago. take 6mg warfarin tonight then resume schedule 5mg 5x a week and 4mg 2x a week. f/u in 4-5 days to repeat INR
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
## 2021-09-24 ENCOUNTER — APPOINTMENT (OUTPATIENT)
Dept: CARDIOLOGY | Facility: CLINIC | Age: 86
End: 2021-09-24
Payer: MEDICARE

## 2021-09-24 ENCOUNTER — NON-APPOINTMENT (OUTPATIENT)
Age: 86
End: 2021-09-24

## 2021-09-24 VITALS
DIASTOLIC BLOOD PRESSURE: 50 MMHG | HEIGHT: 67 IN | BODY MASS INDEX: 28.88 KG/M2 | WEIGHT: 184 LBS | SYSTOLIC BLOOD PRESSURE: 110 MMHG | HEART RATE: 75 BPM

## 2021-09-24 LAB
INR PPP: 2.3 RATIO
POCT-PROTHROMBIN TIME: 27.2 SECS

## 2021-09-24 PROCEDURE — 99214 OFFICE O/P EST MOD 30 MIN: CPT

## 2021-09-24 PROCEDURE — 85610 PROTHROMBIN TIME: CPT | Mod: QW

## 2021-09-24 RX ORDER — AMOXICILLIN 500 MG/1
500 CAPSULE ORAL
Qty: 20 | Refills: 2 | Status: DISCONTINUED | COMMUNITY
Start: 2017-05-15 | End: 2021-09-24

## 2021-09-24 RX ORDER — ISOSORBIDE MONONITRATE 10 MG/1
10 TABLET ORAL
Qty: 90 | Refills: 2 | Status: DISCONTINUED | COMMUNITY
Start: 2019-06-12 | End: 2021-09-24

## 2021-09-24 NOTE — DISCUSSION/SUMMARY
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, 3V CABG 1996 at [facility], PCI 2019 and 2020, ICD [facility],  AF on coumadin\par \par ICM:\par appears euvolemic\par on lasix 20mg\par c/w metoprolol succ and lisinopril\par cannot tolerate coreg due to hypotension\par \par CAD:\par c/w plavix\par c/w statin and fenofibrate 160, goal LDL <70\par c/w lisinopril 2.5\par will decrease metoprolol succ to 12.5mg since BP is low\par c/w lasix 20mg - pt appears euvolemic\par \par AF:\par on coumadin without complications\par INR checks with Dr Hart\par \par Pt will return in 3 mnths or sooner as needed but I encouraged communication via phone or portal if necessary.\par The described plan was discussed with the pt.  All questions and concerns were addressed to the best of my knowledge.

## 2021-09-24 NOTE — PHYSICAL EXAM
[Well Developed] : well developed [Well Nourished] : well nourished [No Acute Distress] : no acute distress [Normal Venous Pressure] : normal venous pressure [No Carotid Bruit] : no carotid bruit [Normal S1, S2] : normal S1, S2 [No Murmur] : no murmur [No Rub] : no rub [No Gallop] : no gallop [Clear Lung Fields] : clear lung fields [Good Air Entry] : good air entry [No Respiratory Distress] : no respiratory distress  [Non Tender] : non-tender [Soft] : abdomen soft [No Masses/organomegaly] : no masses/organomegaly [Normal Bowel Sounds] : normal bowel sounds [Normal Gait] : normal gait [No Edema] : no edema [No Cyanosis] : no cyanosis [No Clubbing] : no clubbing [No Varicosities] : no varicosities [No Rash] : no rash [No Skin Lesions] : no skin lesions [Moves all extremities] : moves all extremities [No Focal Deficits] : no focal deficits [Normal Speech] : normal speech [Alert and Oriented] : alert and oriented [Normal memory] : normal memory [General Appearance - Well Developed] : well developed [Normal Appearance] : normal appearance [Well Groomed] : well groomed [General Appearance - Well Nourished] : well nourished [No Deformities] : no deformities [Normal Conjunctiva] : the conjunctiva exhibited no abnormalities [General Appearance - In No Acute Distress] : no acute distress [Eyelids - No Xanthelasma] : the eyelids demonstrated no xanthelasmas [Normal Oral Mucosa] : normal oral mucosa [No Oral Pallor] : no oral pallor [No Oral Cyanosis] : no oral cyanosis [Respiration, Rhythm And Depth] : normal respiratory rhythm and effort [Exaggerated Use Of Accessory Muscles For Inspiration] : no accessory muscle use [Auscultation Breath Sounds / Voice Sounds] : lungs were clear to auscultation bilaterally [Heart Rate And Rhythm] : heart rate and rhythm were normal [Heart Sounds] : normal S1 and S2 [Murmurs] : no murmurs present [Arterial Pulses Normal] : the arterial pulses were normal [Edema] : no peripheral edema present [Abdomen Soft] : soft [Abdomen Tenderness] : non-tender [Abdomen Mass (___ Cm)] : no abdominal mass palpated [Gait - Sufficient For Exercise Testing] : the gait was sufficient for exercise testing [Abnormal Walk] : normal gait [Nail Clubbing] : no clubbing of the fingernails [Cyanosis, Localized] : no localized cyanosis [Petechial Hemorrhages (___cm)] : no petechial hemorrhages [Oriented To Time, Place, And Person] : oriented to person, place, and time [] : no ischemic changes [Impaired Insight] : insight and judgment were intact [Affect] : the affect was normal [Mood] : the mood was normal [No Anxiety] : not feeling anxious [FreeTextEntry1] : no JVD or bruits

## 2021-09-24 NOTE — REVIEW OF SYSTEMS
[Negative] : Heme/Lymph [SOB] : no shortness of breath [Dyspnea on exertion] : not dyspnea during exertion [Chest Discomfort] : no chest discomfort [Leg Claudication] : no intermittent leg claudication [Palpitations] : no palpitations [Syncope] : no syncope
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
## 2021-09-24 NOTE — REASON FOR VISIT
[Symptom and Test Evaluation] : symptom and test evaluation [Follow-Up - Clinic] : a clinic follow-up of [Cardiomyopathy] : cardiomyopathy [Chest Pain] : chest pain [Coronary Artery Disease] : coronary artery disease [Dyspnea] : dyspnea

## 2021-10-07 ENCOUNTER — NON-APPOINTMENT (OUTPATIENT)
Age: 86
End: 2021-10-07

## 2021-10-07 ENCOUNTER — APPOINTMENT (OUTPATIENT)
Dept: OPHTHALMOLOGY | Facility: CLINIC | Age: 86
End: 2021-10-07
Payer: MEDICARE

## 2021-10-07 PROCEDURE — 92134 CPTRZ OPH DX IMG PST SGM RTA: CPT

## 2021-10-07 PROCEDURE — 92012 INTRM OPH EXAM EST PATIENT: CPT

## 2021-10-25 ENCOUNTER — APPOINTMENT (OUTPATIENT)
Dept: FAMILY MEDICINE | Facility: CLINIC | Age: 86
End: 2021-10-25
Payer: MEDICARE

## 2021-10-25 DIAGNOSIS — S41.119A LACERATION W/OUT FOREIGN BODY OF UNSPECIFIED UPPER ARM, INITIAL ENCOUNTER: ICD-10-CM

## 2021-10-25 LAB — INR PPP: 1.8 RATIO

## 2021-10-25 PROCEDURE — G0008: CPT

## 2021-10-25 PROCEDURE — 85610 PROTHROMBIN TIME: CPT | Mod: QW

## 2021-10-25 PROCEDURE — 90662 IIV NO PRSV INCREASED AG IM: CPT

## 2021-10-25 PROCEDURE — 99213 OFFICE O/P EST LOW 20 MIN: CPT | Mod: 25

## 2021-12-01 ENCOUNTER — APPOINTMENT (OUTPATIENT)
Dept: OPHTHALMOLOGY | Facility: CLINIC | Age: 86
End: 2021-12-01
Payer: MEDICARE

## 2021-12-01 ENCOUNTER — NON-APPOINTMENT (OUTPATIENT)
Age: 86
End: 2021-12-01

## 2021-12-01 PROCEDURE — 99213 OFFICE O/P EST LOW 20 MIN: CPT

## 2021-12-03 ENCOUNTER — RX RENEWAL (OUTPATIENT)
Age: 86
End: 2021-12-03

## 2021-12-08 ENCOUNTER — TRANSCRIPTION ENCOUNTER (OUTPATIENT)
Age: 86
End: 2021-12-08

## 2021-12-13 ENCOUNTER — RX RENEWAL (OUTPATIENT)
Age: 86
End: 2021-12-13

## 2021-12-20 ENCOUNTER — APPOINTMENT (OUTPATIENT)
Dept: CARDIOLOGY | Facility: CLINIC | Age: 86
End: 2021-12-20
Payer: MEDICARE

## 2021-12-20 ENCOUNTER — NON-APPOINTMENT (OUTPATIENT)
Age: 86
End: 2021-12-20

## 2021-12-20 VITALS
SYSTOLIC BLOOD PRESSURE: 110 MMHG | OXYGEN SATURATION: 94 % | DIASTOLIC BLOOD PRESSURE: 60 MMHG | HEART RATE: 77 BPM | HEIGHT: 67 IN | WEIGHT: 191 LBS | BODY MASS INDEX: 29.98 KG/M2

## 2021-12-20 LAB
INR PPP: 3.5 RATIO
POCT-PROTHROMBIN TIME: 42.2 SECS

## 2021-12-20 PROCEDURE — 99214 OFFICE O/P EST MOD 30 MIN: CPT | Mod: 25

## 2021-12-20 PROCEDURE — 93000 ELECTROCARDIOGRAM COMPLETE: CPT

## 2021-12-29 ENCOUNTER — APPOINTMENT (OUTPATIENT)
Dept: FAMILY MEDICINE | Facility: CLINIC | Age: 86
End: 2021-12-29
Payer: MEDICARE

## 2021-12-29 LAB — INR PPP: 2.2 RATIO

## 2021-12-29 PROCEDURE — 99212 OFFICE O/P EST SF 10 MIN: CPT | Mod: 25

## 2021-12-29 PROCEDURE — 85610 PROTHROMBIN TIME: CPT | Mod: QW

## 2022-01-06 ENCOUNTER — APPOINTMENT (OUTPATIENT)
Dept: CARDIOLOGY | Facility: CLINIC | Age: 87
End: 2022-01-06
Payer: COMMERCIAL

## 2022-01-06 PROCEDURE — 93017 CV STRESS TEST TRACING ONLY: CPT

## 2022-01-06 PROCEDURE — 93018 CV STRESS TEST I&R ONLY: CPT

## 2022-01-06 PROCEDURE — A9500: CPT

## 2022-01-06 PROCEDURE — 99211 OFF/OP EST MAY X REQ PHY/QHP: CPT | Mod: 25

## 2022-01-06 PROCEDURE — 78452 HT MUSCLE IMAGE SPECT MULT: CPT

## 2022-01-06 PROCEDURE — 85610 PROTHROMBIN TIME: CPT | Mod: QW

## 2022-01-06 PROCEDURE — 93016 CV STRESS TEST SUPVJ ONLY: CPT

## 2022-01-11 ENCOUNTER — APPOINTMENT (OUTPATIENT)
Dept: ELECTROPHYSIOLOGY | Facility: CLINIC | Age: 87
End: 2022-01-11

## 2022-01-12 ENCOUNTER — RX RENEWAL (OUTPATIENT)
Age: 87
End: 2022-01-12

## 2022-01-14 ENCOUNTER — RX RENEWAL (OUTPATIENT)
Age: 87
End: 2022-01-14

## 2022-01-18 ENCOUNTER — APPOINTMENT (OUTPATIENT)
Dept: FAMILY MEDICINE | Facility: CLINIC | Age: 87
End: 2022-01-18
Payer: COMMERCIAL

## 2022-01-18 ENCOUNTER — NON-APPOINTMENT (OUTPATIENT)
Age: 87
End: 2022-01-18

## 2022-01-18 VITALS — DIASTOLIC BLOOD PRESSURE: 80 MMHG | HEART RATE: 76 BPM | RESPIRATION RATE: 16 BRPM | SYSTOLIC BLOOD PRESSURE: 130 MMHG

## 2022-01-18 LAB — INR PPP: 3.3 RATIO

## 2022-01-18 PROCEDURE — 85610 PROTHROMBIN TIME: CPT | Mod: QW

## 2022-01-18 PROCEDURE — 99214 OFFICE O/P EST MOD 30 MIN: CPT | Mod: 25

## 2022-01-18 PROCEDURE — 94010 BREATHING CAPACITY TEST: CPT

## 2022-01-18 NOTE — REVIEW OF SYSTEMS
[Negative] : Heme/Lymph [Feeling Fatigued] : feeling fatigued [SOB] : no shortness of breath [Dyspnea on exertion] : dyspnea during exertion [Chest Discomfort] : no chest discomfort [Leg Claudication] : no intermittent leg claudication [Palpitations] : no palpitations [Syncope] : no syncope

## 2022-01-18 NOTE — HISTORY OF PRESENT ILLNESS
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, 3V CABG 1996 at [facility], PCI 2019 and 2020, mod to sev ICM, ICD [facility],  AF on coumadin\par cardiac cath 06/2019 patent graft to LAD, mod disease in SVG to OM, D1 s/p ELOY x2\par cardiac cath 06/2020 ELOY to ostial/prox SVG to OM\par Of note anginal symptoms were "feeling exhausted", GARDNER\par COVID vaccine 02/2021 \par \par He states that he has been fatigued and SOB for a long time now and feels he is not getting better.  He denies CP, diaphoresis, palpitations, dizziness, syncope, LE edema, PND, orthopnea. \par He golfs frequently\par \par TTE 07/2021 EF 30-35%, mild MR/TR/PI, mild AS, mild-mod AI

## 2022-01-18 NOTE — HISTORY OF PRESENT ILLNESS
[Coronary Artery Disease] : Coronary Artery Disease [Hyperlipidemia] : Hyperlipidemia [Hypertension] : Hypertension [Other: ___] : [unfilled] [Check glucose ___ x/day] : Patient checks  blood glucose [unfilled]  times a day [Most Recent A1C: ___] : Most recent A1C was [unfilled] [Does not check BP] : The patient is not checking blood pressure [Target goal met] : BP target goal met [Doing Well] : Patient is doing well [Moderate Intensity Therapy] : Patient is currently on moderate intensity statin  therapy [Well Controlled] : Overall status has been well controlled [FreeTextEntry6] : PT C/O  SOB AND RECENTLY SAW  CARDIOLOGY HAD STRESS  TEST CARDIO INCREASED METOPROLOL TO 25 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

## 2022-01-18 NOTE — DISCUSSION/SUMMARY
[FreeTextEntry1] : Pt is an 89 y/o M with PMH CAD s/p MI 1973, 3V CABG 1996 at [facility], PCI 2019 and 2020, ICD [facility],  AF on coumadin\par He is c/o GARDNER and fatigue\par \par ICM:\par appears euvolemic\par on lasix 20mg\par c/w metoprolol succ and lisinopril\par cannot tolerate coreg due to hypotension\par will repeat nuc stress test to eval for ischemia\par \par CAD:\par c/w plavix\par c/w statin and fenofibrate 160, goal LDL <70\par c/w lisinopril 2.5\par increase metoprolol to 25mg to see if this helps with symptoms - monitor closely for hypotension\par c/w lasix 20mg - pt appears euvolemic\par \par AF:\par on coumadin without complications\par INR checks with Dr Hart\par \par Pt will return in 3 mnths or sooner as needed but I encouraged communication via phone or portal if necessary.\par The described plan was discussed with the pt.  All questions and concerns were addressed to the best of my knowledge.

## 2022-01-18 NOTE — PHYSICAL EXAM
[No Acute Distress] : no acute distress [PERRL] : pupils equal round and reactive to light [Normal TMs] : both tympanic membranes were normal [Supple] : supple [Clear to Auscultation] : lungs were clear to auscultation bilaterally [Regular Rhythm] : with a regular rhythm [No Edema] : there was no peripheral edema [Normal Supraclavicular Nodes] : no supraclavicular lymphadenopathy [Normal Posterior Cervical Nodes] : no posterior cervical lymphadenopathy [Normal Anterior Cervical Nodes] : no anterior cervical lymphadenopathy [Normal] : no CVA or spinal tenderness

## 2022-01-18 NOTE — ASSESSMENT
[FreeTextEntry1] : 89 yr old male  with parker  stress test pending cath 2 yrs ago had angioplasty has hx of gist  tumor  pft normal check ghb and additional labs  will check ct of chest

## 2022-01-19 LAB
ALBUMIN SERPL ELPH-MCNC: 5.3 G/DL
ALP BLD-CCNC: 86 U/L
ALT SERPL-CCNC: 31 U/L
ANION GAP SERPL CALC-SCNC: 15 MMOL/L
APPEARANCE: CLEAR
AST SERPL-CCNC: 31 U/L
BACTERIA: NEGATIVE
BASOPHILS # BLD AUTO: 0.05 K/UL
BASOPHILS NFR BLD AUTO: 0.9 %
BILIRUB SERPL-MCNC: 0.5 MG/DL
BILIRUBIN URINE: NEGATIVE
BLOOD URINE: NEGATIVE
BUN SERPL-MCNC: 31 MG/DL
CALCIUM SERPL-MCNC: 10.5 MG/DL
CHLORIDE SERPL-SCNC: 103 MMOL/L
CHOLEST SERPL-MCNC: 160 MG/DL
CO2 SERPL-SCNC: 23 MMOL/L
COLOR: YELLOW
CREAT SERPL-MCNC: 1.39 MG/DL
CREAT SPEC-SCNC: 48 MG/DL
EOSINOPHIL # BLD AUTO: 0.15 K/UL
EOSINOPHIL NFR BLD AUTO: 2.8 %
ESTIMATED AVERAGE GLUCOSE: 120 MG/DL
GLUCOSE QUALITATIVE U: NEGATIVE
GLUCOSE SERPL-MCNC: 103 MG/DL
HBA1C MFR BLD HPLC: 5.8 %
HCT VFR BLD CALC: 45.8 %
HDLC SERPL-MCNC: 54 MG/DL
HGB BLD-MCNC: 15.1 G/DL
HYALINE CASTS: 0 /LPF
IMM GRANULOCYTES NFR BLD AUTO: 0.6 %
KETONES URINE: NEGATIVE
LDLC SERPL CALC-MCNC: 63 MG/DL
LEUKOCYTE ESTERASE URINE: NEGATIVE
LYMPHOCYTES # BLD AUTO: 1.38 K/UL
LYMPHOCYTES NFR BLD AUTO: 25.5 %
MAN DIFF?: NORMAL
MCHC RBC-ENTMCNC: 29.9 PG
MCHC RBC-ENTMCNC: 33 GM/DL
MCV RBC AUTO: 90.7 FL
MICROALBUMIN 24H UR DL<=1MG/L-MCNC: 1.5 MG/DL
MICROALBUMIN/CREAT 24H UR-RTO: 30 MG/G
MICROSCOPIC-UA: NORMAL
MONOCYTES # BLD AUTO: 0.69 K/UL
MONOCYTES NFR BLD AUTO: 12.8 %
NEUTROPHILS # BLD AUTO: 3.11 K/UL
NEUTROPHILS NFR BLD AUTO: 57.4 %
NITRITE URINE: NEGATIVE
NONHDLC SERPL-MCNC: 106 MG/DL
NT-PROBNP SERPL-MCNC: 1338 PG/ML
PH URINE: 7
PLATELET # BLD AUTO: 188 K/UL
POTASSIUM SERPL-SCNC: 4.9 MMOL/L
PROT SERPL-MCNC: 7.4 G/DL
PROTEIN URINE: NEGATIVE
RBC # BLD: 5.05 M/UL
RBC # FLD: 14.3 %
RED BLOOD CELLS URINE: 1 /HPF
SODIUM SERPL-SCNC: 141 MMOL/L
SPECIFIC GRAVITY URINE: 1.01
SQUAMOUS EPITHELIAL CELLS: 0 /HPF
T4 SERPL-MCNC: 6 UG/DL
TRIGL SERPL-MCNC: 215 MG/DL
TSH SERPL-ACNC: 2.09 UIU/ML
URATE SERPL-MCNC: 4.5 MG/DL
UROBILINOGEN URINE: NORMAL
WBC # FLD AUTO: 5.41 K/UL
WHITE BLOOD CELLS URINE: 0 /HPF

## 2022-01-26 ENCOUNTER — APPOINTMENT (OUTPATIENT)
Dept: CT IMAGING | Facility: CLINIC | Age: 87
End: 2022-01-26
Payer: COMMERCIAL

## 2022-01-26 ENCOUNTER — APPOINTMENT (OUTPATIENT)
Dept: OPHTHALMOLOGY | Facility: CLINIC | Age: 87
End: 2022-01-26
Payer: COMMERCIAL

## 2022-01-26 ENCOUNTER — NON-APPOINTMENT (OUTPATIENT)
Age: 87
End: 2022-01-26

## 2022-01-26 ENCOUNTER — OUTPATIENT (OUTPATIENT)
Dept: OUTPATIENT SERVICES | Facility: HOSPITAL | Age: 87
LOS: 1 days | End: 2022-01-26
Payer: COMMERCIAL

## 2022-01-26 DIAGNOSIS — Z98.89 OTHER SPECIFIED POSTPROCEDURAL STATES: Chronic | ICD-10-CM

## 2022-01-26 DIAGNOSIS — Z98.49 CATARACT EXTRACTION STATUS, UNSPECIFIED EYE: Chronic | ICD-10-CM

## 2022-01-26 DIAGNOSIS — S22.39XA FRACTURE OF ONE RIB, UNSPECIFIED SIDE, INITIAL ENCOUNTER FOR CLOSED FRACTURE: Chronic | ICD-10-CM

## 2022-01-26 DIAGNOSIS — Z95.1 PRESENCE OF AORTOCORONARY BYPASS GRAFT: Chronic | ICD-10-CM

## 2022-01-26 DIAGNOSIS — R06.00 DYSPNEA, UNSPECIFIED: ICD-10-CM

## 2022-01-26 DIAGNOSIS — C49.A0 GASTROINTESTINAL STROMAL TUMOR, UNSPECIFIED SITE: ICD-10-CM

## 2022-01-26 PROCEDURE — 71250 CT THORAX DX C-: CPT | Mod: 26

## 2022-01-26 PROCEDURE — 92012 INTRM OPH EXAM EST PATIENT: CPT

## 2022-01-26 PROCEDURE — 71250 CT THORAX DX C-: CPT

## 2022-01-26 PROCEDURE — 92134 CPTRZ OPH DX IMG PST SGM RTA: CPT

## 2022-01-31 ENCOUNTER — APPOINTMENT (OUTPATIENT)
Dept: FAMILY MEDICINE | Facility: CLINIC | Age: 87
End: 2022-01-31
Payer: COMMERCIAL

## 2022-01-31 LAB — INR PPP: 4.2 RATIO

## 2022-01-31 PROCEDURE — 85610 PROTHROMBIN TIME: CPT | Mod: QW

## 2022-01-31 PROCEDURE — 99213 OFFICE O/P EST LOW 20 MIN: CPT | Mod: 25

## 2022-02-01 LAB
INR PPP: 2.8 RATIO
POCT-PROTHROMBIN TIME: 33.2 SECS

## 2022-02-09 ENCOUNTER — APPOINTMENT (OUTPATIENT)
Dept: FAMILY MEDICINE | Facility: CLINIC | Age: 87
End: 2022-02-09
Payer: COMMERCIAL

## 2022-02-09 LAB — INR PPP: 2.7 RATIO

## 2022-02-09 PROCEDURE — 85610 PROTHROMBIN TIME: CPT | Mod: QW

## 2022-02-09 PROCEDURE — 99212 OFFICE O/P EST SF 10 MIN: CPT | Mod: 25

## 2022-03-02 ENCOUNTER — APPOINTMENT (OUTPATIENT)
Dept: FAMILY MEDICINE | Facility: CLINIC | Age: 87
End: 2022-03-02
Payer: COMMERCIAL

## 2022-03-02 ENCOUNTER — RX RENEWAL (OUTPATIENT)
Age: 87
End: 2022-03-02

## 2022-03-02 PROCEDURE — 99213 OFFICE O/P EST LOW 20 MIN: CPT | Mod: 25

## 2022-03-02 PROCEDURE — 85610 PROTHROMBIN TIME: CPT | Mod: QW

## 2022-03-02 NOTE — ASSESSMENT
[FreeTextEntry1] : chf  still severe  parker  add spironolactone  incrreae  lisinopril to 20 mg daily inr 2.2 same dose  OF COUMADIN  do not take  any supplements unless  checked eat consistent diet\par

## 2022-03-09 ENCOUNTER — APPOINTMENT (OUTPATIENT)
Dept: CARDIOLOGY | Facility: CLINIC | Age: 87
End: 2022-03-09

## 2022-03-14 ENCOUNTER — APPOINTMENT (OUTPATIENT)
Dept: FAMILY MEDICINE | Facility: CLINIC | Age: 87
End: 2022-03-14
Payer: COMMERCIAL

## 2022-03-14 VITALS — HEART RATE: 72 BPM | RESPIRATION RATE: 16 BRPM | SYSTOLIC BLOOD PRESSURE: 110 MMHG | DIASTOLIC BLOOD PRESSURE: 70 MMHG

## 2022-03-14 VITALS
OXYGEN SATURATION: 97 % | TEMPERATURE: 97.8 F | HEART RATE: 71 BPM | SYSTOLIC BLOOD PRESSURE: 120 MMHG | DIASTOLIC BLOOD PRESSURE: 68 MMHG | HEIGHT: 69 IN | BODY MASS INDEX: 28.88 KG/M2 | WEIGHT: 195 LBS

## 2022-03-14 LAB — INR PPP: 3.2 RATIO

## 2022-03-14 PROCEDURE — 85610 PROTHROMBIN TIME: CPT | Mod: QW

## 2022-03-14 PROCEDURE — 99214 OFFICE O/P EST MOD 30 MIN: CPT | Mod: 25

## 2022-03-14 NOTE — ASSESSMENT
[FreeTextEntry1] : 89 YR OLD MALE  WITH DIARRHEA WILL RECHECK FOR C-DIFF CHF  START VERQUVO 2.5 MG DAILY FOR 3 WS THEN INCREASE TO 5 MG INR 3.2  HOLD COUMADIN FOR 1 DAY DIARRHEA  CHECK FOR C-DIFF

## 2022-03-14 NOTE — PHYSICAL EXAM
[No Acute Distress] : no acute distress [Normal TMs] : both tympanic membranes were normal [PERRL] : pupils equal round and reactive to light [Supple] : supple [Clear to Auscultation] : lungs were clear to auscultation bilaterally [Regular Rhythm] : with a regular rhythm [No Edema] : there was no peripheral edema [Normal Supraclavicular Nodes] : no supraclavicular lymphadenopathy [Normal Posterior Cervical Nodes] : no posterior cervical lymphadenopathy [Normal Anterior Cervical Nodes] : no anterior cervical lymphadenopathy [Normal] : no CVA or spinal tenderness [No Joint Swelling] : no joint swelling [No Rash] : no rash [No Focal Deficits] : no focal deficits [Normal Insight/Judgement] : insight and judgment were intact

## 2022-03-16 LAB — GI PCR PANEL, STOOL: ABNORMAL

## 2022-03-20 LAB
C DIFF TOX B STL QL CT TISS CULT: NORMAL
Lab: NORMAL

## 2022-03-22 NOTE — DISCHARGE NOTE NURSING/CASE MANAGEMENT/SOCIAL WORK - NSCORESITESY/N_GEN_A_CORE_RD
Pt admitted alert with increase in HANG. Pt lungs wheeze/diminished bilateral. High flow started at 15L 30% with continuous Albuterol 20mg running at present time. Pt tachypnea noted with some moderate retractions. Dr. Margot Rai at [facility] for evaulation. Placed on all caridac, bp, spo2 monitors. Oriented to unit and explained policies and procedures. POC discussed. NURSING ADMISSION NOTE      Patient admitted via Cart  Oriented to room. Safety precautions initiated. Bed in low position. Call light in reach. No

## 2022-04-06 ENCOUNTER — APPOINTMENT (OUTPATIENT)
Dept: FAMILY MEDICINE | Facility: CLINIC | Age: 87
End: 2022-04-06
Payer: MEDICARE

## 2022-04-06 DIAGNOSIS — B96.89 ACUTE SINUSITIS, UNSPECIFIED: ICD-10-CM

## 2022-04-06 DIAGNOSIS — J01.90 ACUTE SINUSITIS, UNSPECIFIED: ICD-10-CM

## 2022-04-06 LAB — INR PPP: 4.2 RATIO

## 2022-04-06 PROCEDURE — 85610 PROTHROMBIN TIME: CPT | Mod: QW

## 2022-04-06 PROCEDURE — 99213 OFFICE O/P EST LOW 20 MIN: CPT | Mod: 25

## 2022-04-06 NOTE — ASSESSMENT
[FreeTextEntry1] : sinusitis  start amoxil   inr 4.2 hold coumadin for 2 days  then start 4 mg x5  5 mg x2

## 2022-04-06 NOTE — HISTORY OF PRESENT ILLNESS
Video Visit- [address]  This is a telemedicine visit with live, interactive video and audio.      Lynn Palencia understands and accepts financial responsibility for any deductible, co-insurance and/or co-pays MCG/ACT Nasal Suspension; 2 sprays by Each Nare route daily. Dispense: 1 Bottle; Refill: 0    2. Cough  - SARS-COV-2 BY PCR (); Future  - Fluticasone Propionate 50 MCG/ACT Nasal Suspension; 2 sprays by Each Nare route daily. Dispense: 1 Bottle;  Refi [FreeTextEntry6] : left  maxillary sinus pain  with left jaw pain and bloody discharge

## 2022-04-07 ENCOUNTER — RX RENEWAL (OUTPATIENT)
Age: 87
End: 2022-04-07

## 2022-04-07 ENCOUNTER — APPOINTMENT (OUTPATIENT)
Dept: OPHTHALMOLOGY | Facility: CLINIC | Age: 87
End: 2022-04-07
Payer: MEDICARE

## 2022-04-07 ENCOUNTER — NON-APPOINTMENT (OUTPATIENT)
Age: 87
End: 2022-04-07

## 2022-04-07 PROCEDURE — 92134 CPTRZ OPH DX IMG PST SGM RTA: CPT

## 2022-04-07 PROCEDURE — 67028 INJECTION EYE DRUG: CPT | Mod: RT

## 2022-04-14 ENCOUNTER — APPOINTMENT (OUTPATIENT)
Dept: FAMILY MEDICINE | Facility: CLINIC | Age: 87
End: 2022-04-14

## 2022-04-21 ENCOUNTER — RX RENEWAL (OUTPATIENT)
Age: 87
End: 2022-04-21

## 2022-04-21 ENCOUNTER — APPOINTMENT (OUTPATIENT)
Dept: FAMILY MEDICINE | Facility: CLINIC | Age: 87
End: 2022-04-21
Payer: MEDICARE

## 2022-04-21 ENCOUNTER — OUTPATIENT (OUTPATIENT)
Dept: OUTPATIENT SERVICES | Facility: HOSPITAL | Age: 87
LOS: 1 days | End: 2022-04-21
Payer: MEDICARE

## 2022-04-21 ENCOUNTER — APPOINTMENT (OUTPATIENT)
Dept: RADIOLOGY | Facility: CLINIC | Age: 87
End: 2022-04-21
Payer: MEDICARE

## 2022-04-21 DIAGNOSIS — M25.551 PAIN IN RIGHT HIP: ICD-10-CM

## 2022-04-21 DIAGNOSIS — Z98.89 OTHER SPECIFIED POSTPROCEDURAL STATES: Chronic | ICD-10-CM

## 2022-04-21 DIAGNOSIS — G89.29 PAIN IN RIGHT HIP: ICD-10-CM

## 2022-04-21 DIAGNOSIS — Z98.49 CATARACT EXTRACTION STATUS, UNSPECIFIED EYE: Chronic | ICD-10-CM

## 2022-04-21 DIAGNOSIS — S22.39XA FRACTURE OF ONE RIB, UNSPECIFIED SIDE, INITIAL ENCOUNTER FOR CLOSED FRACTURE: Chronic | ICD-10-CM

## 2022-04-21 DIAGNOSIS — Z95.1 PRESENCE OF AORTOCORONARY BYPASS GRAFT: Chronic | ICD-10-CM

## 2022-04-21 PROCEDURE — 99214 OFFICE O/P EST MOD 30 MIN: CPT | Mod: 25

## 2022-04-21 PROCEDURE — 73502 X-RAY EXAM HIP UNI 2-3 VIEWS: CPT | Mod: 26,RT

## 2022-04-21 PROCEDURE — 73502 X-RAY EXAM HIP UNI 2-3 VIEWS: CPT

## 2022-04-21 PROCEDURE — 85610 PROTHROMBIN TIME: CPT | Mod: QW

## 2022-04-21 RX ORDER — LISINOPRIL 10 MG/1
10 TABLET ORAL DAILY
Qty: 135 | Refills: 1 | Status: DISCONTINUED | COMMUNITY
Start: 2020-06-08 | End: 2022-04-21

## 2022-04-21 RX ORDER — VERICIGUAT 5 MG/1
5 TABLET, FILM COATED ORAL DAILY
Qty: 90 | Refills: 1 | Status: DISCONTINUED | COMMUNITY
Start: 2022-04-11 | End: 2022-04-21

## 2022-04-21 NOTE — ASSESSMENT
[FreeTextEntry1] : hip  pain x-ray hip  chf no improvement with pop will dc lisnopril and restart entresto

## 2022-04-21 NOTE — HISTORY OF PRESENT ILLNESS
[FreeTextEntry1] : f/u  chf  [de-identified] : pop did  not help still has  parker and fatigue pt com of hip pain worse when walking

## 2022-05-01 ENCOUNTER — RX RENEWAL (OUTPATIENT)
Age: 87
End: 2022-05-01

## 2022-05-02 ENCOUNTER — APPOINTMENT (OUTPATIENT)
Dept: FAMILY MEDICINE | Facility: CLINIC | Age: 87
End: 2022-05-02
Payer: MEDICARE

## 2022-05-02 VITALS — DIASTOLIC BLOOD PRESSURE: 60 MMHG | HEART RATE: 72 BPM | SYSTOLIC BLOOD PRESSURE: 80 MMHG | RESPIRATION RATE: 16 BRPM

## 2022-05-02 LAB — INR PPP: 3.2 RATIO

## 2022-05-02 PROCEDURE — 99214 OFFICE O/P EST MOD 30 MIN: CPT | Mod: 25

## 2022-05-02 PROCEDURE — 85610 PROTHROMBIN TIME: CPT | Mod: QW

## 2022-05-02 RX ORDER — AMOXICILLIN 875 MG/1
875 TABLET, FILM COATED ORAL
Qty: 14 | Refills: 0 | Status: COMPLETED | COMMUNITY
Start: 2022-04-06 | End: 2022-05-02

## 2022-05-02 NOTE — DATA REVIEWED
[FreeTextEntry1] : inr  3.2 same dose  OF COUMADIN  do not take  any supplements unless  checked eat consistent diet\par

## 2022-05-02 NOTE — PHYSICAL EXAM
[No Acute Distress] : no acute distress [Regular Rhythm] : with a regular rhythm [No Edema] : there was no peripheral edema

## 2022-05-02 NOTE — HISTORY OF PRESENT ILLNESS
[FreeTextEntry1] : pt here for management of inr and chf  [de-identified] : having sob at rest  started on entresto 2 wks ago bp low  80/60

## 2022-05-08 ENCOUNTER — NON-APPOINTMENT (OUTPATIENT)
Age: 87
End: 2022-05-08

## 2022-05-10 ENCOUNTER — TRANSCRIPTION ENCOUNTER (OUTPATIENT)
Age: 87
End: 2022-05-10

## 2022-05-12 ENCOUNTER — APPOINTMENT (OUTPATIENT)
Dept: DISASTER EMERGENCY | Facility: HOSPITAL | Age: 87
End: 2022-05-12

## 2022-05-12 ENCOUNTER — OUTPATIENT (OUTPATIENT)
Dept: INPATIENT UNIT | Facility: HOSPITAL | Age: 87
LOS: 1 days | End: 2022-05-12
Payer: MEDICARE

## 2022-05-12 VITALS
OXYGEN SATURATION: 98 % | SYSTOLIC BLOOD PRESSURE: 147 MMHG | DIASTOLIC BLOOD PRESSURE: 66 MMHG | HEART RATE: 63 BPM | TEMPERATURE: 98 F | RESPIRATION RATE: 18 BRPM

## 2022-05-12 VITALS
WEIGHT: 192.02 LBS | DIASTOLIC BLOOD PRESSURE: 70 MMHG | HEIGHT: 69 IN | TEMPERATURE: 98 F | SYSTOLIC BLOOD PRESSURE: 164 MMHG | OXYGEN SATURATION: 98 % | RESPIRATION RATE: 18 BRPM | HEART RATE: 66 BPM

## 2022-05-12 DIAGNOSIS — Z98.49 CATARACT EXTRACTION STATUS, UNSPECIFIED EYE: Chronic | ICD-10-CM

## 2022-05-12 DIAGNOSIS — Z98.89 OTHER SPECIFIED POSTPROCEDURAL STATES: Chronic | ICD-10-CM

## 2022-05-12 DIAGNOSIS — S22.39XA FRACTURE OF ONE RIB, UNSPECIFIED SIDE, INITIAL ENCOUNTER FOR CLOSED FRACTURE: Chronic | ICD-10-CM

## 2022-05-12 DIAGNOSIS — U07.1 COVID-19: ICD-10-CM

## 2022-05-12 DIAGNOSIS — Z95.1 PRESENCE OF AORTOCORONARY BYPASS GRAFT: Chronic | ICD-10-CM

## 2022-05-12 PROCEDURE — M0222: CPT

## 2022-05-12 RX ORDER — BEBTELOVIMAB 87.5 MG/ML
175 INJECTION, SOLUTION INTRAVENOUS ONCE
Refills: 0 | Status: COMPLETED | OUTPATIENT
Start: 2022-05-12 | End: 2022-05-12

## 2022-05-12 RX ADMIN — BEBTELOVIMAB 175 MILLIGRAM(S): 87.5 INJECTION, SOLUTION INTRAVENOUS at 15:26

## 2022-05-12 NOTE — CHART NOTE - NSCHARTNOTEFT_GEN_A_CORE
CC: Monoclonal Antibody Injection - COVID 19 Positive or significant COVID exposure       History: Patient presents for an injection of Bebtelovimab monoclonal antibodies. Patient has been screened and was deemed to be a candidate.    Date tested COVID positive: 5/8      COVID Symptoms:  Date of onset:   body aches, rhinorrhea, diarrhea      Risk Profile includes:   * DM-    * HTN-       Vaccination Status:   Booster Vaccine: Pfizer Fall 2021      No Known Allergies      bebtelovimab (EUA) Injectable 175 milliGRAM(s) IV Push once        PMHx:  Infection due to severe acute respiratory syndrome coronavirus 2 (SARS-CoV-2)    FH: CHF (congestive heart failure)    FH: myocardial infarction    MEWS Score    CAD (coronary artery disease)    Cardiomyopathy, ischemic    AICD (automatic cardioverter/defibrillator) present    DM (diabetes mellitus)    HTN (hypertension)    HLD (hyperlipidemia)    TIA (transient ischemic attack)    Low back pain    CRI (chronic renal insufficiency)    PAF (paroxysmal atrial fibrillation)    Opioid abuse    History of liver failure    S/P CABG x 3    S/P tonsillectomy    S/P hernia repair    S/P cataract extraction    Closed rib fracture          T(C): 36.7 (05-12-22 @ 15:17), Max: 36.7 (05-12-22 @ 15:17)  HR: 66 (05-12-22 @ 15:17) (66 - 66)  BP: 164/70 (05-12-22 @ 15:17) (164/70 - 164/70)  RR: 18 (05-12-22 @ 15:17) (18 - 18)  SpO2: 98% (05-12-22 @ 15:17) (98% - 98%)      PE- Well developed, well-nourish, resting comfortably in NAD.   Cardiac- +regular rate and rhythm.   Pulm- Normal rate.  No distress.  Abd soft and non-tender.   Neuro- A&Ox3, no gross sensory deficits to light touch or motor weaknesses.   Vasc- No peripheral edema or venous stasis noted.  Skin- No ecchymosis or bleeding.  MS- No bony tenderness       ASSESSMENT:  Pt is a 89y year old Male COVID positive and symptomatic who was referred for a single injection of Bebtelovimab monoclonal antibody treatment.      PLAN:  - Injection procedure explained to patient   - Consent for monoclonal antibody infusion obtained   - Risk & benefits discussed/all questions answered  - Injection of Bebtelovimab  - Will observe patient for one hour post injection and then discharge home with outpatient follow up as planned by PMD.    POST INFUSION ASSESSMENT:   DISCHARGE at approximately 1645 hours    - Patient tolerated injection - well denies complaints of chest pain, SOB, dizziness or palpitations  - VSS for discharge home  - D/C instructions given/ fact sheet included.  - Patient to follow-up with PCP as needed.

## 2022-05-13 ENCOUNTER — RX RENEWAL (OUTPATIENT)
Age: 87
End: 2022-05-13

## 2022-05-19 ENCOUNTER — NON-APPOINTMENT (OUTPATIENT)
Age: 87
End: 2022-05-19

## 2022-05-19 ENCOUNTER — APPOINTMENT (OUTPATIENT)
Dept: FAMILY MEDICINE | Facility: CLINIC | Age: 87
End: 2022-05-19
Payer: MEDICARE

## 2022-05-19 VITALS
DIASTOLIC BLOOD PRESSURE: 54 MMHG | SYSTOLIC BLOOD PRESSURE: 94 MMHG | TEMPERATURE: 97.6 F | WEIGHT: 191 LBS | BODY MASS INDEX: 28.29 KG/M2 | OXYGEN SATURATION: 95 % | HEIGHT: 69 IN | HEART RATE: 74 BPM

## 2022-05-19 VITALS — RESPIRATION RATE: 16 BRPM | DIASTOLIC BLOOD PRESSURE: 60 MMHG | SYSTOLIC BLOOD PRESSURE: 94 MMHG | HEART RATE: 72 BPM

## 2022-05-19 DIAGNOSIS — U07.1 COVID-19: ICD-10-CM

## 2022-05-19 LAB — INR PPP: 6 RATIO

## 2022-05-19 PROCEDURE — 85610 PROTHROMBIN TIME: CPT | Mod: QW

## 2022-05-19 PROCEDURE — 99214 OFFICE O/P EST MOD 30 MIN: CPT | Mod: 25

## 2022-05-19 RX ORDER — TRAMADOL HYDROCHLORIDE 50 MG/1
50 TABLET, COATED ORAL
Qty: 60 | Refills: 0 | Status: COMPLETED | COMMUNITY
Start: 2021-09-02 | End: 2022-05-19

## 2022-05-19 RX ORDER — OXYCODONE AND ACETAMINOPHEN 5; 325 MG/1; MG/1
5-325 TABLET ORAL EVERY 8 HOURS
Qty: 21 | Refills: 0 | Status: COMPLETED | COMMUNITY
Start: 2021-09-20 | End: 2022-05-19

## 2022-05-19 NOTE — PHYSICAL EXAM
[No Acute Distress] : no acute distress [PERRL] : pupils equal round and reactive to light [Normal Oropharynx] : the oropharynx was normal [Supple] : supple [Regular Rhythm] : with a regular rhythm [No Edema] : there was no peripheral edema [Normal] : soft, non-tender, non-distended, no masses palpated, no HSM and normal bowel sounds [No Rash] : no rash

## 2022-05-19 NOTE — HISTORY OF PRESENT ILLNESS
[Congestive Heart Failure] : Congestive Heart Failure [Coronary Artery Disease] : Coronary Artery Disease [Hyperlipidemia] : Hyperlipidemia [Hypertension] : Hypertension [Other: ___] : [unfilled] [FreeTextEntry6] : pt had  covid 5/5/22 has  slight diarrhea slight headache and aches pt c/o  hip pain has  slight cough  [FreeTextEntry1] : pt here for management of inr and chf  [de-identified] : having sob at rest  started on entresto 2 wks ago bp low  80/60

## 2022-05-19 NOTE — ASSESSMENT
[FreeTextEntry1] : chf  can not increase  entresto because bp too low inr 6.0  pt not taking meds correctly  spoke  with son hold coumadin for 5 days and then start 4 mg daily repeat 1 wk no cp sob same recovering from covid  cad continue plavix

## 2022-05-26 ENCOUNTER — APPOINTMENT (OUTPATIENT)
Dept: FAMILY MEDICINE | Facility: CLINIC | Age: 87
End: 2022-05-26
Payer: MEDICARE

## 2022-05-26 ENCOUNTER — APPOINTMENT (OUTPATIENT)
Dept: OPHTHALMOLOGY | Facility: CLINIC | Age: 87
End: 2022-05-26

## 2022-05-26 LAB — INR PPP: 1.3 RATIO

## 2022-05-26 PROCEDURE — 85610 PROTHROMBIN TIME: CPT | Mod: QW

## 2022-05-26 PROCEDURE — 99212 OFFICE O/P EST SF 10 MIN: CPT | Mod: 25

## 2022-05-31 ENCOUNTER — APPOINTMENT (OUTPATIENT)
Dept: FAMILY MEDICINE | Facility: CLINIC | Age: 87
End: 2022-05-31

## 2022-06-02 ENCOUNTER — NON-APPOINTMENT (OUTPATIENT)
Age: 87
End: 2022-06-02

## 2022-06-02 ENCOUNTER — APPOINTMENT (OUTPATIENT)
Dept: FAMILY MEDICINE | Facility: CLINIC | Age: 87
End: 2022-06-02
Payer: MEDICARE

## 2022-06-02 ENCOUNTER — APPOINTMENT (OUTPATIENT)
Dept: CARDIOLOGY | Facility: CLINIC | Age: 87
End: 2022-06-02
Payer: MEDICARE

## 2022-06-02 ENCOUNTER — APPOINTMENT (OUTPATIENT)
Dept: OPHTHALMOLOGY | Facility: CLINIC | Age: 87
End: 2022-06-02
Payer: MEDICARE

## 2022-06-02 VITALS
SYSTOLIC BLOOD PRESSURE: 110 MMHG | DIASTOLIC BLOOD PRESSURE: 60 MMHG | BODY MASS INDEX: 28.29 KG/M2 | OXYGEN SATURATION: 94 % | HEIGHT: 69 IN | HEART RATE: 69 BPM | WEIGHT: 191 LBS

## 2022-06-02 DIAGNOSIS — R94.5 ABNORMAL RESULTS OF LIVER FUNCTION STUDIES: ICD-10-CM

## 2022-06-02 LAB — INR PPP: 3.3 RATIO

## 2022-06-02 PROCEDURE — 85018 HEMOGLOBIN: CPT | Mod: QW

## 2022-06-02 PROCEDURE — 99212 OFFICE O/P EST SF 10 MIN: CPT | Mod: 25

## 2022-06-02 PROCEDURE — 99214 OFFICE O/P EST MOD 30 MIN: CPT | Mod: 25

## 2022-06-02 PROCEDURE — 92134 CPTRZ OPH DX IMG PST SGM RTA: CPT

## 2022-06-02 PROCEDURE — 67028 INJECTION EYE DRUG: CPT | Mod: RT

## 2022-06-02 PROCEDURE — 93000 ELECTROCARDIOGRAM COMPLETE: CPT

## 2022-06-02 NOTE — HISTORY OF PRESENT ILLNESS
[FreeTextEntry1] : Pt is an 89 y/o M with PMH CAD s/p MI 1973, 3V CABG 1996 at [facility], PCI 2019 and 2020, mod to sev ICM, ICD [facility],  AF on coumadin\par cardiac cath 06/2019 patent graft to LAD, mod disease in SVG to OM, D1 s/p ELOY x2\par cardiac cath 06/2020 ELOY to ostial/prox SVG to OM\par Of note anginal symptoms were "feeling exhausted", GARDNER\par COVID vaccine 02/2021 \par COVID+ 05/2022\par \par He states that he has been fatigued and SOB for a long time now, symptoms are not new.  He denies CP, diaphoresis, palpitations, dizziness, syncope, LE edema, PND, orthopnea. \par He is not very active\par \par TTE 07/2021 EF 30-35%, mild MR/TR/PI, mild AS, mild-mod AI\par Nuc stress test 01/2022 large, severe mostly fixed defects apical, apical ant, distal ant, mid anterior, inferior walls

## 2022-06-02 NOTE — DISCUSSION/SUMMARY
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, 3V CABG 1996 at [facility], PCI 2019 and 2020, ICD [facility],  AF on coumadin\par He is c/o continued GARDNER and fatigue\par \par ICM:\par nuc stress test was negative for ischemia\par will increase lasix to 40mg to see if this helps with symptoms\par check labs in 1-2 weeks\par repeat TTE\par Advised pt to go to the nearest ED if symptoms persist or worsen \par c/w metoprolol succ and Entresto (cannot increase dose due to low BP)\par cannot tolerate coreg due to hypotension\par \par CAD:\par c/w plavix\par c/w statin and fenofibrate 160, goal LDL <70\par c/w lisinopril 2.5\par increase metoprolol to 25mg to see if this helps with symptoms - monitor closely for hypotension\par c/w lasix 20mg - pt appears euvolemic\par \par AF:\par on coumadin without complications\par INR checks with Dr Hart\par c/w dig and metoprolol\par \par Pt will return in 3 mnths or sooner as needed but I encouraged communication via phone or portal if necessary.\par The described plan was discussed with the pt.  All questions and concerns were addressed to the best of my knowledge.

## 2022-06-02 NOTE — REVIEW OF SYSTEMS
[Feeling Fatigued] : feeling fatigued [Dyspnea on exertion] : dyspnea during exertion [Negative] : Heme/Lymph [SOB] : no shortness of breath [Chest Discomfort] : no chest discomfort [Leg Claudication] : no intermittent leg claudication [Palpitations] : no palpitations [Syncope] : no syncope

## 2022-06-03 ENCOUNTER — RX RENEWAL (OUTPATIENT)
Age: 87
End: 2022-06-03

## 2022-06-08 ENCOUNTER — RX RENEWAL (OUTPATIENT)
Age: 87
End: 2022-06-08

## 2022-06-09 ENCOUNTER — APPOINTMENT (OUTPATIENT)
Dept: FAMILY MEDICINE | Facility: CLINIC | Age: 87
End: 2022-06-09
Payer: MEDICARE

## 2022-06-09 LAB
25(OH)D3 SERPL-MCNC: 60.4 NG/ML
ALBUMIN SERPL ELPH-MCNC: 5 G/DL
ALP BLD-CCNC: 72 U/L
ALT SERPL-CCNC: 15 U/L
ANION GAP SERPL CALC-SCNC: 14 MMOL/L
AST SERPL-CCNC: 22 U/L
BASOPHILS # BLD AUTO: 0.05 K/UL
BASOPHILS NFR BLD AUTO: 1.1 %
BILIRUB SERPL-MCNC: 0.5 MG/DL
BUN SERPL-MCNC: 61 MG/DL
CALCIUM SERPL-MCNC: 9.8 MG/DL
CHLORIDE SERPL-SCNC: 109 MMOL/L
CHOLEST SERPL-MCNC: 148 MG/DL
CO2 SERPL-SCNC: 18 MMOL/L
CREAT SERPL-MCNC: 1.89 MG/DL
EGFR: 33 ML/MIN/1.73M2
EOSINOPHIL # BLD AUTO: 0.28 K/UL
EOSINOPHIL NFR BLD AUTO: 6 %
ESTIMATED AVERAGE GLUCOSE: 117 MG/DL
FOLATE SERPL-MCNC: >20 NG/ML
GLUCOSE SERPL-MCNC: 106 MG/DL
HBA1C MFR BLD HPLC: 5.7 %
HCT VFR BLD CALC: 42.4 %
HDLC SERPL-MCNC: 47 MG/DL
HGB BLD-MCNC: 13.5 G/DL
IMM GRANULOCYTES NFR BLD AUTO: 0.2 %
INR PPP: 3.2 RATIO
LDLC SERPL CALC-MCNC: 69 MG/DL
LYMPHOCYTES # BLD AUTO: 1.41 K/UL
LYMPHOCYTES NFR BLD AUTO: 30.1 %
MAGNESIUM SERPL-MCNC: 2.2 MG/DL
MAN DIFF?: NORMAL
MCHC RBC-ENTMCNC: 30.4 PG
MCHC RBC-ENTMCNC: 31.8 GM/DL
MCV RBC AUTO: 95.5 FL
MONOCYTES # BLD AUTO: 0.69 K/UL
MONOCYTES NFR BLD AUTO: 14.7 %
NEUTROPHILS # BLD AUTO: 2.25 K/UL
NEUTROPHILS NFR BLD AUTO: 47.9 %
NONHDLC SERPL-MCNC: 101 MG/DL
PLATELET # BLD AUTO: 189 K/UL
POTASSIUM SERPL-SCNC: 5.8 MMOL/L
PROT SERPL-MCNC: 7.4 G/DL
RBC # BLD: 4.44 M/UL
RBC # FLD: 14.5 %
SODIUM SERPL-SCNC: 141 MMOL/L
TRIGL SERPL-MCNC: 162 MG/DL
TSH SERPL-ACNC: 1.34 UIU/ML
VIT B12 SERPL-MCNC: 796 PG/ML
WBC # FLD AUTO: 4.69 K/UL

## 2022-06-09 PROCEDURE — 99212 OFFICE O/P EST SF 10 MIN: CPT | Mod: 25

## 2022-06-09 PROCEDURE — 85610 PROTHROMBIN TIME: CPT | Mod: QW

## 2022-06-15 ENCOUNTER — APPOINTMENT (OUTPATIENT)
Dept: FAMILY MEDICINE | Facility: CLINIC | Age: 87
End: 2022-06-15
Payer: MEDICARE

## 2022-06-15 LAB — INR PPP: 6.8 RATIO

## 2022-06-15 PROCEDURE — 85610 PROTHROMBIN TIME: CPT | Mod: QW

## 2022-06-15 PROCEDURE — 99212 OFFICE O/P EST SF 10 MIN: CPT | Mod: 25

## 2022-06-16 ENCOUNTER — APPOINTMENT (OUTPATIENT)
Dept: FAMILY MEDICINE | Facility: CLINIC | Age: 87
End: 2022-06-16

## 2022-06-17 ENCOUNTER — RX RENEWAL (OUTPATIENT)
Age: 87
End: 2022-06-17

## 2022-06-20 ENCOUNTER — APPOINTMENT (OUTPATIENT)
Dept: FAMILY MEDICINE | Facility: CLINIC | Age: 87
End: 2022-06-20
Payer: MEDICARE

## 2022-06-20 LAB — INR PPP: 1.7 RATIO

## 2022-06-20 PROCEDURE — 85610 PROTHROMBIN TIME: CPT | Mod: QW

## 2022-06-20 PROCEDURE — 99212 OFFICE O/P EST SF 10 MIN: CPT | Mod: 25

## 2022-06-23 ENCOUNTER — APPOINTMENT (OUTPATIENT)
Dept: CARDIOLOGY | Facility: CLINIC | Age: 87
End: 2022-06-23
Payer: MEDICARE

## 2022-06-23 ENCOUNTER — NON-APPOINTMENT (OUTPATIENT)
Age: 87
End: 2022-06-23

## 2022-06-23 ENCOUNTER — APPOINTMENT (OUTPATIENT)
Dept: FAMILY MEDICINE | Facility: CLINIC | Age: 87
End: 2022-06-23
Payer: MEDICARE

## 2022-06-23 VITALS
SYSTOLIC BLOOD PRESSURE: 130 MMHG | HEART RATE: 78 BPM | DIASTOLIC BLOOD PRESSURE: 50 MMHG | BODY MASS INDEX: 28.29 KG/M2 | HEIGHT: 69 IN | OXYGEN SATURATION: 96 % | WEIGHT: 191 LBS

## 2022-06-23 DIAGNOSIS — R19.7 DIARRHEA, UNSPECIFIED: ICD-10-CM

## 2022-06-23 LAB
INR PPP: 1.1 RATIO
POCT-PROTHROMBIN TIME: 13.2 SECS

## 2022-06-23 PROCEDURE — 93306 TTE W/DOPPLER COMPLETE: CPT

## 2022-06-23 PROCEDURE — 93000 ELECTROCARDIOGRAM COMPLETE: CPT

## 2022-06-23 PROCEDURE — 99213 OFFICE O/P EST LOW 20 MIN: CPT | Mod: 25

## 2022-06-23 PROCEDURE — 85610 PROTHROMBIN TIME: CPT | Mod: QW

## 2022-06-23 PROCEDURE — 99214 OFFICE O/P EST MOD 30 MIN: CPT | Mod: 25

## 2022-06-23 NOTE — DISCUSSION/SUMMARY
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, 3V CABG 1996 at [facility], PCI 2019 and 2020, ICD [facility],  AF on coumadin\par He is c/o continued GARDNER and fatigue - slight improvement with increase lasix to 40mg\par check BP at home to assess for hypotension\par recent nuc stress test was negative for ischemia\par TTE shows no evidence of severe valuvlar pathology\par \par ICM:\par nuc stress test was negative for ischemia\par c/w  lasix to 40mg to see if this helps with symptoms\par repeat labs today to assess renal function\par Advised pt to go to the nearest ED if symptoms persist or worsen \par c/w metoprolol succ and Entresto (cannot increase dose due to low BP)\par cannot tolerate coreg due to hypotension\par \par CAD:\par c/w plavix\par c/w statin and fenofibrate 160, goal LDL <70\par c/w Entresto\par c/w metoprolol 25mg \par c/w lasix 40mg - pt appears euvolemic\par \par AF:\par on coumadin without complications\par INR = 1.1\par change coumadin to Eliquis 2.5mg bid\par c/w dig and metoprolol\par check dig level today \par \par Pt will return in 1 mnths or sooner as needed but I encouraged communication via phone or portal if necessary.\par The described plan was discussed with the pt.  All questions and concerns were addressed to the best of my knowledge.

## 2022-06-23 NOTE — HISTORY OF PRESENT ILLNESS
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, PCI 2019 and 2020, mod to sev ICM, ICD [facility],  AF on coumadin\par cardiac cath 06/2019 patent graft to LAD, mod disease in SVG to OM, D1 s/p ELOY x2\par cardiac cath 06/2020 ELOY to ostial/prox SVG to OM\par Of note anginal symptoms were "feeling exhausted", GARDNER\par COVID vaccine 02/2021 \par COVID+ 05/2022\par \par He states that he has been fatigued and SOB for a long time now, symptoms are not new.  He thinks that with increase of lasix he has felt a bit better.  Cr increased with increase of lasix.  He denies CP, diaphoresis, palpitations, dizziness, syncope, LE edema, PND, orthopnea. \par He is not very active\par \par TTE 07/2021 EF 30-35%, mild MR/TR/PI, mild AS, mild-mod AI\par Nuc stress test 01/2022 large, severe mostly fixed defects apical, apical ant, distal ant, mid anterior, inferior walls

## 2022-06-27 ENCOUNTER — APPOINTMENT (OUTPATIENT)
Dept: FAMILY MEDICINE | Facility: CLINIC | Age: 87
End: 2022-06-27

## 2022-06-27 LAB
ALBUMIN SERPL ELPH-MCNC: 4.7 G/DL
ALBUMIN SERPL ELPH-MCNC: 4.9 G/DL
ALP BLD-CCNC: 74 U/L
ALP BLD-CCNC: 75 U/L
ALT SERPL-CCNC: 13 U/L
ALT SERPL-CCNC: 16 U/L
ANION GAP SERPL CALC-SCNC: 11 MMOL/L
ANION GAP SERPL CALC-SCNC: 11 MMOL/L
AST SERPL-CCNC: 19 U/L
AST SERPL-CCNC: 24 U/L
BILIRUB SERPL-MCNC: 0.5 MG/DL
BILIRUB SERPL-MCNC: 0.5 MG/DL
BUN SERPL-MCNC: 49 MG/DL
BUN SERPL-MCNC: 56 MG/DL
CALCIUM SERPL-MCNC: 10.1 MG/DL
CALCIUM SERPL-MCNC: 9.8 MG/DL
CHLORIDE SERPL-SCNC: 106 MMOL/L
CHLORIDE SERPL-SCNC: 108 MMOL/L
CO2 SERPL-SCNC: 22 MMOL/L
CO2 SERPL-SCNC: 24 MMOL/L
COVID-19 NUCLEOCAPSID  GAM ANTIBODY INTERPRETATION: POSITIVE
COVID-19 SPIKE DOMAIN ANTIBODY INTERPRETATION: POSITIVE
CREAT SERPL-MCNC: 1.81 MG/DL
CREAT SERPL-MCNC: 2.07 MG/DL
DIGOXIN SERPL-MCNC: 1.5 NG/ML
EGFR: 30 ML/MIN/1.73M2
EGFR: 35 ML/MIN/1.73M2
GLUCOSE SERPL-MCNC: 122 MG/DL
GLUCOSE SERPL-MCNC: 156 MG/DL
POTASSIUM SERPL-SCNC: 6.3 MMOL/L
POTASSIUM SERPL-SCNC: 6.3 MMOL/L
PROT SERPL-MCNC: 7 G/DL
PROT SERPL-MCNC: 7.2 G/DL
SARS-COV-2 AB SERPL IA-ACNC: 213 U/ML
SARS-COV-2 AB SERPL QL IA: 84.6 INDEX
SODIUM SERPL-SCNC: 141 MMOL/L
SODIUM SERPL-SCNC: 142 MMOL/L

## 2022-06-27 RX ORDER — FUROSEMIDE 40 MG/1
40 TABLET ORAL
Qty: 90 | Refills: 3 | Status: DISCONTINUED | COMMUNITY
Start: 2020-07-01 | End: 2022-06-27

## 2022-06-29 ENCOUNTER — FORM ENCOUNTER (OUTPATIENT)
Age: 87
End: 2022-06-29

## 2022-06-30 LAB
ANION GAP SERPL CALC-SCNC: 13 MMOL/L
BUN SERPL-MCNC: 68 MG/DL
CALCIUM SERPL-MCNC: 10.1 MG/DL
CHLORIDE SERPL-SCNC: 109 MMOL/L
CO2 SERPL-SCNC: 22 MMOL/L
CREAT SERPL-MCNC: 2.51 MG/DL
EGFR: 24 ML/MIN/1.73M2
GLUCOSE SERPL-MCNC: 140 MG/DL
POTASSIUM SERPL-SCNC: 5.8 MMOL/L
SODIUM SERPL-SCNC: 144 MMOL/L

## 2022-06-30 RX ORDER — SPIRONOLACTONE 25 MG/1
25 TABLET ORAL DAILY
Qty: 90 | Refills: 3 | Status: DISCONTINUED | COMMUNITY
Start: 2022-03-02 | End: 2022-06-30

## 2022-07-07 ENCOUNTER — RX RENEWAL (OUTPATIENT)
Age: 87
End: 2022-07-07

## 2022-07-08 LAB
ALBUMIN SERPL ELPH-MCNC: 4.9 G/DL
ALP BLD-CCNC: 89 U/L
ALT SERPL-CCNC: 19 U/L
ANION GAP SERPL CALC-SCNC: 13 MMOL/L
AST SERPL-CCNC: 30 U/L
BILIRUB SERPL-MCNC: 0.3 MG/DL
BUN SERPL-MCNC: 36 MG/DL
CALCIUM SERPL-MCNC: 10.3 MG/DL
CHLORIDE SERPL-SCNC: 110 MMOL/L
CO2 SERPL-SCNC: 24 MMOL/L
CREAT SERPL-MCNC: 1.86 MG/DL
EGFR: 34 ML/MIN/1.73M2
GLUCOSE SERPL-MCNC: 81 MG/DL
POTASSIUM SERPL-SCNC: 5.4 MMOL/L
PROT SERPL-MCNC: 7.2 G/DL
SODIUM SERPL-SCNC: 146 MMOL/L

## 2022-07-12 ENCOUNTER — APPOINTMENT (OUTPATIENT)
Dept: ELECTROPHYSIOLOGY | Facility: CLINIC | Age: 87
End: 2022-07-12

## 2022-07-12 VITALS
HEART RATE: 70 BPM | SYSTOLIC BLOOD PRESSURE: 128 MMHG | WEIGHT: 190 LBS | HEIGHT: 69 IN | BODY MASS INDEX: 28.14 KG/M2 | OXYGEN SATURATION: 94 % | DIASTOLIC BLOOD PRESSURE: 68 MMHG

## 2022-07-12 PROCEDURE — 93290 INTERROG DEV EVAL ICPMS IP: CPT | Mod: 26

## 2022-07-12 PROCEDURE — 93283 PRGRMG EVAL IMPLANTABLE DFB: CPT

## 2022-07-12 RX ORDER — WARFARIN 1 MG/1
1 TABLET ORAL DAILY
Qty: 90 | Refills: 2 | Status: DISCONTINUED | COMMUNITY
Start: 2018-07-25 | End: 2022-07-12

## 2022-07-12 RX ORDER — WARFARIN 5 MG/1
5 TABLET ORAL
Qty: 90 | Refills: 1 | Status: DISCONTINUED | COMMUNITY
Start: 2020-04-17 | End: 2022-07-12

## 2022-07-12 RX ORDER — WARFARIN 1 MG/1
1 TABLET ORAL
Qty: 90 | Refills: 2 | Status: DISCONTINUED | COMMUNITY
Start: 2020-08-25 | End: 2022-07-12

## 2022-07-12 RX ORDER — WARFARIN 5 MG/1
5 TABLET ORAL
Qty: 45 | Refills: 1 | Status: DISCONTINUED | COMMUNITY
Start: 2017-10-24 | End: 2022-07-12

## 2022-07-12 NOTE — PROCEDURE
[No] : not [NSR] : normal sinus rhythm [ICD] : Implantable cardioverter-defibrillator [Longevity: ___ months] : The estimated remaining battery life is [unfilled] months [Threshold Testing Performed] : Threshold testing was performed [Lead Imp:  ___ohms] : lead impedance was [unfilled] ohms [Sensing Amplitude ___mv] : sensing amplitude was [unfilled] mv [___V @] : [unfilled] V [___ ms] : [unfilled] ms [None] : none [Asense-Vsense ___ %] : Asense-Vsense [unfilled]% [de-identified] : Medtronic [de-identified] : Kanu [de-identified] : [id number] [de-identified] : 8/21/2014 [de-identified] : AAI-DDD [de-identified] : 50

## 2022-07-12 NOTE — DISCUSSION/SUMMARY
[AICD Function Normal] : normal AICD function [Patient] : the patient [de-identified] : f/u 6 months for ICD check [FreeTextEntry1] : No events recorded, no therapies delivered.\par No PAF seen, he is maintained on Eliquis.\par F/U with Dr. Sinclair at end of month.\par ICD check in 6 months.\par Revisited education regarding remote ICD monitoring with pt but he says he is thinking about it.\par Battery status 2.8 yrs.\par

## 2022-07-14 ENCOUNTER — APPOINTMENT (OUTPATIENT)
Dept: OPHTHALMOLOGY | Facility: CLINIC | Age: 87
End: 2022-07-14

## 2022-07-14 ENCOUNTER — NON-APPOINTMENT (OUTPATIENT)
Age: 87
End: 2022-07-14

## 2022-07-14 PROCEDURE — 92134 CPTRZ OPH DX IMG PST SGM RTA: CPT

## 2022-07-14 PROCEDURE — 67028 INJECTION EYE DRUG: CPT | Mod: RT

## 2022-07-22 ENCOUNTER — APPOINTMENT (OUTPATIENT)
Dept: CARDIOLOGY | Facility: CLINIC | Age: 87
End: 2022-07-22

## 2022-07-22 ENCOUNTER — NON-APPOINTMENT (OUTPATIENT)
Age: 87
End: 2022-07-22

## 2022-07-22 VITALS
OXYGEN SATURATION: 96 % | HEIGHT: 69 IN | SYSTOLIC BLOOD PRESSURE: 130 MMHG | BODY MASS INDEX: 28.14 KG/M2 | DIASTOLIC BLOOD PRESSURE: 50 MMHG | HEART RATE: 75 BPM | WEIGHT: 190 LBS

## 2022-07-22 PROCEDURE — 99214 OFFICE O/P EST MOD 30 MIN: CPT | Mod: 25

## 2022-07-22 PROCEDURE — 93000 ELECTROCARDIOGRAM COMPLETE: CPT

## 2022-07-22 NOTE — REASON FOR VISIT
[Follow-Up - Clinic] : a clinic follow-up of [Cardiomyopathy] : cardiomyopathy [Chest Pain] : chest pain [Dyspnea] : dyspnea [Hypertension] : hypertension [Coronary Artery Disease] : coronary artery disease [Cardiac Failure] : cardiac failure

## 2022-07-22 NOTE — REVIEW OF SYSTEMS
[Feeling Fatigued] : feeling fatigued [Negative] : Heme/Lymph [SOB] : no shortness of breath [Dyspnea on exertion] : not dyspnea during exertion [Chest Discomfort] : no chest discomfort [Leg Claudication] : no intermittent leg claudication [Palpitations] : no palpitations [Syncope] : no syncope

## 2022-07-22 NOTE — HISTORY OF PRESENT ILLNESS
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, [facility], PCI 2019 and 2020, mod to sev ICM, ICD [facility],  AF on coumadin\par cardiac cath 06/2019 patent graft to LAD, mod disease in SVG to OM, D1 s/p ELOY x2\par cardiac cath 06/2020 ELOY to ostial/prox SVG to OM\par Of note anginal symptoms were "feeling exhausted", GARDNER\par COVID vaccine 02/2021 \par COVID+ 05/2022\par \par Recently with hyperkalemia and increased Cr - Entresto dose decreased \par Pt tells me that he has been feeling better - breathing has improved, reports feeling well and has no active cardiac complaints - denies CP, SOB, palpitations, dizziness, syncope, edema, orthopnea, PND, orthopnea.  No exertional symptoms. \par \par TTE 07/2021 EF 30-35%, mild MR/TR/PI, mild AS, mild-mod AI\par TTE 06/2022 EF 30-35%\par Nuc stress test 01/2022 large, severe mostly fixed defects apical, apical ant, distal ant, mid anterior, inferior walls

## 2022-07-22 NOTE — PHYSICAL EXAM
[Well Developed] : well developed [Well Nourished] : well nourished [No Acute Distress] : no acute distress [Normal Venous Pressure] : normal venous pressure [No Carotid Bruit] : no carotid bruit [Normal S1, S2] : normal S1, S2 [No Murmur] : no murmur [No Rub] : no rub [No Gallop] : no gallop [Clear Lung Fields] : clear lung fields [Good Air Entry] : good air entry [No Respiratory Distress] : no respiratory distress  [Soft] : abdomen soft [Non Tender] : non-tender [No Masses/organomegaly] : no masses/organomegaly [Normal Bowel Sounds] : normal bowel sounds [Normal Gait] : normal gait [No Edema] : no edema [No Cyanosis] : no cyanosis [No Clubbing] : no clubbing [No Varicosities] : no varicosities [No Rash] : no rash [No Skin Lesions] : no skin lesions [Moves all extremities] : moves all extremities [No Focal Deficits] : no focal deficits [Normal Speech] : normal speech [Alert and Oriented] : alert and oriented [Normal memory] : normal memory [General Appearance - Well Developed] : well developed [Normal Appearance] : normal appearance [Well Groomed] : well groomed [General Appearance - Well Nourished] : well nourished [No Deformities] : no deformities [General Appearance - In No Acute Distress] : no acute distress [Normal Conjunctiva] : the conjunctiva exhibited no abnormalities [Eyelids - No Xanthelasma] : the eyelids demonstrated no xanthelasmas [Normal Oral Mucosa] : normal oral mucosa [No Oral Pallor] : no oral pallor [No Oral Cyanosis] : no oral cyanosis [Respiration, Rhythm And Depth] : normal respiratory rhythm and effort [Exaggerated Use Of Accessory Muscles For Inspiration] : no accessory muscle use [Auscultation Breath Sounds / Voice Sounds] : lungs were clear to auscultation bilaterally [Heart Rate And Rhythm] : heart rate and rhythm were normal [Heart Sounds] : normal S1 and S2 [Murmurs] : no murmurs present [Arterial Pulses Normal] : the arterial pulses were normal [Edema] : no peripheral edema present [Abdomen Soft] : soft [Abdomen Tenderness] : non-tender [Abdomen Mass (___ Cm)] : no abdominal mass palpated [Abnormal Walk] : normal gait [Gait - Sufficient For Exercise Testing] : the gait was sufficient for exercise testing [Nail Clubbing] : no clubbing of the fingernails [Cyanosis, Localized] : no localized cyanosis [Petechial Hemorrhages (___cm)] : no petechial hemorrhages [] : no ischemic changes [Oriented To Time, Place, And Person] : oriented to person, place, and time [Impaired Insight] : insight and judgment were intact [Affect] : the affect was normal [No Anxiety] : not feeling anxious [Mood] : the mood was normal [FreeTextEntry1] : no JVD or bruits

## 2022-07-22 NOTE — DISCUSSION/SUMMARY
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, 3V CABG 1996 at [facility], PCI 2019 and 2020, ICD [facility],  AF on coumadin\par He is feeling better overall\par \par ICM:\par nuc stress test was negative for ischemia\par c/w  lasix \par repeat labs today to assess renal function and K\par c/w metoprolol succ and Entresto \par cannot tolerate coreg due to hypotension\par \par CAD:\par c/w plavix\par c/w statin and fenofibrate 160, goal LDL <70\par c/w Entresto\par c/w metoprolol 25mg \par c/w lasix - pt appears euvolemic\par \par AF:\par on Eliquis 2.5mg bid - he denies any bleeding issues\par c/w dig and metoprolol\par \par Pt will return in 3 mnths or sooner as needed but I encouraged communication via phone or portal if necessary.\par The described plan was discussed with the pt.  All questions and concerns were addressed to the best of my knowledge.

## 2022-07-24 ENCOUNTER — RX RENEWAL (OUTPATIENT)
Age: 87
End: 2022-07-24

## 2022-07-25 LAB
ALBUMIN SERPL ELPH-MCNC: 4.6 G/DL
ALP BLD-CCNC: 78 U/L
ALT SERPL-CCNC: 16 U/L
ANION GAP SERPL CALC-SCNC: 15 MMOL/L
AST SERPL-CCNC: 27 U/L
BASOPHILS # BLD AUTO: 0.06 K/UL
BASOPHILS NFR BLD AUTO: 1.4 %
BILIRUB SERPL-MCNC: 0.4 MG/DL
BUN SERPL-MCNC: 40 MG/DL
CALCIUM SERPL-MCNC: 9.8 MG/DL
CHLORIDE SERPL-SCNC: 104 MMOL/L
CO2 SERPL-SCNC: 21 MMOL/L
CREAT SERPL-MCNC: 1.62 MG/DL
EGFR: 40 ML/MIN/1.73M2
EOSINOPHIL # BLD AUTO: 0.33 K/UL
EOSINOPHIL NFR BLD AUTO: 7.6 %
GLUCOSE SERPL-MCNC: 211 MG/DL
HCT VFR BLD CALC: 44.8 %
HGB BLD-MCNC: 13.8 G/DL
IMM GRANULOCYTES NFR BLD AUTO: 0.5 %
LYMPHOCYTES # BLD AUTO: 1.17 K/UL
LYMPHOCYTES NFR BLD AUTO: 27 %
MAN DIFF?: NORMAL
MCHC RBC-ENTMCNC: 30.8 GM/DL
MCHC RBC-ENTMCNC: 30.8 PG
MCV RBC AUTO: 100 FL
MONOCYTES # BLD AUTO: 0.6 K/UL
MONOCYTES NFR BLD AUTO: 13.9 %
NEUTROPHILS # BLD AUTO: 2.15 K/UL
NEUTROPHILS NFR BLD AUTO: 49.6 %
PLATELET # BLD AUTO: 172 K/UL
POTASSIUM SERPL-SCNC: 5 MMOL/L
PROT SERPL-MCNC: 7 G/DL
RBC # BLD: 4.48 M/UL
RBC # FLD: 14.2 %
SODIUM SERPL-SCNC: 140 MMOL/L
WBC # FLD AUTO: 4.33 K/UL

## 2022-07-26 ENCOUNTER — NON-APPOINTMENT (OUTPATIENT)
Age: 87
End: 2022-07-26

## 2022-07-27 ENCOUNTER — NON-APPOINTMENT (OUTPATIENT)
Age: 87
End: 2022-07-27

## 2022-07-27 ENCOUNTER — APPOINTMENT (OUTPATIENT)
Dept: ELECTROPHYSIOLOGY | Facility: CLINIC | Age: 87
End: 2022-07-27

## 2022-07-27 VITALS — HEART RATE: 72 BPM | SYSTOLIC BLOOD PRESSURE: 120 MMHG | DIASTOLIC BLOOD PRESSURE: 60 MMHG

## 2022-07-27 PROCEDURE — 93283 PRGRMG EVAL IMPLANTABLE DFB: CPT

## 2022-07-27 PROCEDURE — 93290 INTERROG DEV EVAL ICPMS IP: CPT | Mod: 26

## 2022-07-27 RX ORDER — SACUBITRIL AND VALSARTAN 24; 26 MG/1; MG/1
24-26 TABLET, FILM COATED ORAL
Qty: 60 | Refills: 5 | Status: DISCONTINUED | COMMUNITY
Start: 2022-04-21 | End: 2022-07-27

## 2022-07-28 ENCOUNTER — APPOINTMENT (OUTPATIENT)
Dept: FAMILY MEDICINE | Facility: CLINIC | Age: 87
End: 2022-07-28

## 2022-07-28 VITALS
BODY MASS INDEX: 28.44 KG/M2 | TEMPERATURE: 97.3 F | WEIGHT: 192 LBS | HEIGHT: 69 IN | HEART RATE: 67 BPM | SYSTOLIC BLOOD PRESSURE: 130 MMHG | OXYGEN SATURATION: 98 % | DIASTOLIC BLOOD PRESSURE: 60 MMHG

## 2022-07-28 VITALS — RESPIRATION RATE: 16 BRPM | DIASTOLIC BLOOD PRESSURE: 70 MMHG | HEART RATE: 72 BPM | SYSTOLIC BLOOD PRESSURE: 150 MMHG

## 2022-07-28 PROCEDURE — 99214 OFFICE O/P EST MOD 30 MIN: CPT

## 2022-07-28 RX ORDER — DICLOFENAC SODIUM 16.05 MG/ML
1.5 SOLUTION TOPICAL
Qty: 1 | Refills: 1 | Status: COMPLETED | COMMUNITY
Start: 2021-09-20 | End: 2022-07-28

## 2022-07-28 RX ORDER — METOPROLOL SUCCINATE 25 MG/1
25 TABLET, EXTENDED RELEASE ORAL
Qty: 180 | Refills: 3 | Status: COMPLETED | COMMUNITY
Start: 2020-07-01 | End: 2022-07-28

## 2022-07-28 RX ORDER — COVID-19 ANTIGEN TEST
KIT MISCELLANEOUS
Qty: 5 | Refills: 0 | Status: COMPLETED | COMMUNITY
Start: 2022-06-14 | End: 2022-07-28

## 2022-07-28 RX ORDER — WARFARIN 4 MG/1
4 TABLET ORAL
Qty: 90 | Refills: 1 | Status: COMPLETED | COMMUNITY
Start: 2020-08-25 | End: 2022-07-28

## 2022-07-28 RX ORDER — SILDENAFIL 25 MG/1
25 TABLET ORAL
Qty: 20 | Refills: 1 | Status: COMPLETED | COMMUNITY
Start: 2020-10-28 | End: 2022-07-28

## 2022-07-28 NOTE — PHYSICAL EXAM
[No Acute Distress] : no acute distress [PERRL] : pupils equal round and reactive to light [Normal Oropharynx] : the oropharynx was normal [Supple] : supple [Regular Rhythm] : with a regular rhythm [No Edema] : there was no peripheral edema [Normal] : soft, non-tender, non-distended, no masses palpated, no HSM and normal bowel sounds [No Rash] : no rash [de-identified] : systolic murmur

## 2022-07-28 NOTE — HISTORY OF PRESENT ILLNESS
[Congestive Heart Failure] : Congestive Heart Failure [Coronary Artery Disease] : Coronary Artery Disease [Hyperlipidemia] : Hyperlipidemia [Hypertension] : Hypertension [Other: ___] : [unfilled] [FreeTextEntry6] : pt had  defective lead wire in aicd  device was shut off on eliquis for af now still has  sob and parker playing golf  [FreeTextEntry1] : pt here for management of inr and chf  [de-identified] : having sob at rest  started on entresto 2 wks ago bp low  80/60

## 2022-07-28 NOTE — ASSESSMENT
[FreeTextEntry1] : chf  continue lasix  and metoprolol  bp acceptable af continue eliquis hld continue eliquis

## 2022-08-02 ENCOUNTER — RX RENEWAL (OUTPATIENT)
Age: 87
End: 2022-08-02

## 2022-08-18 ENCOUNTER — NON-APPOINTMENT (OUTPATIENT)
Age: 87
End: 2022-08-18

## 2022-08-18 ENCOUNTER — APPOINTMENT (OUTPATIENT)
Dept: OPHTHALMOLOGY | Facility: CLINIC | Age: 87
End: 2022-08-18

## 2022-08-18 PROCEDURE — 67028 INJECTION EYE DRUG: CPT | Mod: RT

## 2022-08-18 PROCEDURE — 92134 CPTRZ OPH DX IMG PST SGM RTA: CPT

## 2022-08-29 ENCOUNTER — RX RENEWAL (OUTPATIENT)
Age: 87
End: 2022-08-29

## 2022-09-01 ENCOUNTER — APPOINTMENT (OUTPATIENT)
Dept: PHYSICAL MEDICINE AND REHAB | Facility: CLINIC | Age: 87
End: 2022-09-01

## 2022-09-01 VITALS
BODY MASS INDEX: 26.07 KG/M2 | HEIGHT: 69 IN | DIASTOLIC BLOOD PRESSURE: 61 MMHG | RESPIRATION RATE: 14 BRPM | HEART RATE: 74 BPM | WEIGHT: 176 LBS | SYSTOLIC BLOOD PRESSURE: 151 MMHG

## 2022-09-01 DIAGNOSIS — Z78.9 OTHER SPECIFIED HEALTH STATUS: ICD-10-CM

## 2022-09-01 PROCEDURE — 99214 OFFICE O/P EST MOD 30 MIN: CPT

## 2022-09-01 PROCEDURE — 99072 ADDL SUPL MATRL&STAF TM PHE: CPT

## 2022-09-01 NOTE — HISTORY OF PRESENT ILLNESS
[FreeTextEntry1] : Patient is a 89yo M with CAD s/p 2 stents, CABG and ICD (now disconnected) on Plavix and Eliquis, CKD 3b, HTN, AFIB, T2DM who presents under workers comp for neck and lower back pain with radiation into right leg. Claim also covers right hip as consequence (incorrectly stated as left hip on paperwork). Pain starts on right side of low back, is intermittent, and radiates down back of right leg to the ankle. He reports occasional burning in the lower extremities.\par \par Patient was working as an  for [employer] on 1/19/1996 when a piece of [employer] wall fell and he jumped off the machine, was hit in the back by the wall. Patient reports he had lumbar MRI but does not recall when. No history of spinal surgery. He had multiple LESIs, none in past 15 years, which were helpful. He was treated by pain management who he says prescribed PO oxycodone and fentanyl patches which he has not taken in 5 years. Currently he takes tizanadine 4mg PRN up to 3x/day (takes once per week) and Tylenol 500 mg 2 tabs up to 10 times daily for past 1 week (July 2022 LFTs wnl). He is no longer taking tramadol. Last PT was 1 month ago. He reports pain with sitting, standing, lying down and walking. He cannot walk 1 block without pain.  [FreeTextEntry6] : Climbing, bending, lifting over 50 lbs [FreeTextEntry3] : Difficulty with bending, walking, standing. Pain in all positions.

## 2022-09-01 NOTE — DATA REVIEWED
[Plain X-Rays] : plain X-Rays [FreeTextEntry1] : X-rays L-spine (2 views) obtained at today's office visit: transitional LS morphology; degenerative rotoscoliosis w/ apex L2.  Advanced L2-3 DDD; osteopenia.\par \par XR HIP 2-3V RT  04/21/2022\par IMPRESSION: No fractures or dislocations. Preserved right hip joint space and no gross radiographic evidence for AVN. Generalized osteopenia otherwise no discrete suspicious lytic or blastic lesions. Vascular calcifications.\par  \par

## 2022-09-01 NOTE — ASSESSMENT
[FreeTextEntry1] : 89 y.o. M w/ h/o CAD s/p 2 stents, CABG and ICD (now disconnected) on Plavix and Eliquis, CKD 3b, HTN, AF and DM presents to office w/ c/o CLBP -> R leg.  I spent most of today's office visit (40 min) reviewing the patient's x-rays, discussing etiology, pathogenesis, further diagnostic work-up and non-operative management.  X-rays consistent w/ degenerative rotoscoliosis.  MRI contraindicated in setting of ICD.  May consider CT L-spine but explained that results are not likely to change patient's management.  Discussed R/B/A to LESI under fluoroscopy but patient understands that he would require cardiology clearance to hold blood thinners.  Rx P.T. for modalities, gentle ROM, stretching and stabilization exercises.  Cautioned patient on chronic use of high dose acetaminophen 2' GI and renal toxicities.  PO NSAIDs absolutely contraindicated 2' patient's CV hx.  May trial OTC Lidoderm patches as directed.  Pt. is in agreement with plan.  All questions answered.  RTC 6-8 weeks.

## 2022-09-01 NOTE — REASON FOR VISIT
[Workers' Comp: Date of Injury: _______] : This visit is related to worker's compensation. Date of Injury: [unfilled] [Initial Evaluation] : an initial evaluation [FreeTextEntry1] : neck and back pain

## 2022-09-01 NOTE — PHYSICAL EXAM
[FreeTextEntry1] : Constitutional: In NAD, calm and cooperative\par Non obese\par HEENT: NCAT, Anicteric sclera, no lid-lag\par Cardio: Extremities appear pink and well perfused, no peripheral edema\par Respiratory: Normal respiratory effort on room air, no accessory muscle use\par Skin: no rashes seen on exposed skin, no visible abrasions\par Psych: Normal affect, intact judgment and insight\par Neuro: Awake, alert and oriented x 3, see below for focused neurological exam\par \par MSK (Back)\par Inspection: no gross swelling identified\par Palpation: Tenderness of the bilateral lower lumbar paraspinals\par ROM: Pain with 30' lumbar flexion, pain with 5' lumbar extension\par Strength: 5/5 strength in bilateral lower extremities\par Reflexes: 2+ Patella reflex bilaterally, 1+ Achilles reflex bilaterally, negative clonus bilaterally\par Sensation: Intact to light touch in bilateral lower extremities, decreased to pinprick in L S1 dermatome\par Able to heel walk, toe walking is limited.\par \par Special tests:\par SLR: negative bilaterally\par Slump: negative bilaterally\par Alyson's: + back pain bilaterally\par FADIR: negative bilaterally\par Facet loading: positive bilaterally\par \par

## 2022-09-14 ENCOUNTER — FORM ENCOUNTER (OUTPATIENT)
Age: 87
End: 2022-09-14

## 2022-09-19 ENCOUNTER — APPOINTMENT (OUTPATIENT)
Dept: CARDIOLOGY | Facility: CLINIC | Age: 87
End: 2022-09-19

## 2022-09-21 ENCOUNTER — RX RENEWAL (OUTPATIENT)
Age: 87
End: 2022-09-21

## 2022-09-21 RX ORDER — BLOOD SUGAR DIAGNOSTIC
STRIP MISCELLANEOUS DAILY
Qty: 100 | Refills: 0 | Status: ACTIVE | COMMUNITY
Start: 2020-10-11 | End: 1900-01-01

## 2022-09-28 ENCOUNTER — APPOINTMENT (OUTPATIENT)
Dept: FAMILY MEDICINE | Facility: CLINIC | Age: 87
End: 2022-09-28

## 2022-09-28 VITALS
HEART RATE: 75 BPM | DIASTOLIC BLOOD PRESSURE: 64 MMHG | SYSTOLIC BLOOD PRESSURE: 166 MMHG | HEIGHT: 69 IN | OXYGEN SATURATION: 95 % | BODY MASS INDEX: 27.55 KG/M2 | TEMPERATURE: 97.3 F | WEIGHT: 186 LBS | RESPIRATION RATE: 16 BRPM

## 2022-09-28 VITALS — SYSTOLIC BLOOD PRESSURE: 156 MMHG | HEART RATE: 72 BPM | RESPIRATION RATE: 16 BRPM | DIASTOLIC BLOOD PRESSURE: 80 MMHG

## 2022-09-28 DIAGNOSIS — M79.89 OTHER SPECIFIED SOFT TISSUE DISORDERS: ICD-10-CM

## 2022-09-28 PROCEDURE — 99214 OFFICE O/P EST MOD 30 MIN: CPT

## 2022-09-28 RX ORDER — GLIMEPIRIDE 1 MG/1
1 TABLET ORAL
Qty: 90 | Refills: 3 | Status: DISCONTINUED | COMMUNITY
Start: 2021-01-11 | End: 2022-09-28

## 2022-09-28 NOTE — HISTORY OF PRESENT ILLNESS
[Atrial Fibrillation] : atrial fibrillation [Coronary Artery Disease] : coronary artery disease [Implantable Device/Pacemaker] : implantable device/pacemaker [No Pertinent Pulmonary History] : no history of asthma, COPD, sleep apnea, or smoking [No Adverse Anesthesia Reaction] : no adverse anesthesia reaction in self or family member [Chronic Anticoagulation] : chronic anticoagulation [Chronic Kidney Disease] : chronic kidney disease [(Patient denies any chest pain, claudication, dyspnea on exertion, orthopnea, palpitations or syncope)] : Patient denies any chest pain, claudication, dyspnea on exertion, orthopnea, palpitations or syncope [Poor (<4 METs)] : Poor (<4 METs) [Anticoagulants: _____] : Anticoagulants: [unfilled] [Aortic Stenosis] : no aortic stenosis [Recent Myocardial Infarction] : no recent myocardial infarction [Diabetes] : no diabetes [FreeTextEntry1] : epidural injection  [FreeTextEntry3] : Dr. Hinojosa  [FreeTextEntry4] : pt is a 89 yr old male her for clearance for epidural .  Pt active  medical problems include cad  chf af aicd implantation ckd

## 2022-09-28 NOTE — PHYSICAL EXAM
[No Acute Distress] : no acute distress [PERRL] : pupils equal round and reactive to light [Normal Oropharynx] : the oropharynx was normal [Supple] : supple [Regular Rhythm] : with a regular rhythm [No Edema] : there was no peripheral edema [Normal] : soft, non-tender, non-distended, no masses palpated, no HSM and normal bowel sounds [No Rash] : no rash [Normal Insight/Judgement] : insight and judgment were intact [de-identified] : systolic murmur  [de-identified] : left leg  swelling

## 2022-09-28 NOTE — REVIEW OF SYSTEMS
[Fever] : no fever [Chills] : no chills [Discharge] : no discharge [Earache] : no earache [Sore Throat] : no sore throat [Chest Pain] : no chest pain [Shortness Of Breath] : shortness of breath [Wheezing] : no wheezing [Cough] : no cough [Abdominal Pain] : no abdominal pain [Diarrhea] : no diarrhea [Dysuria] : no dysuria [Back Pain] : back pain [Skin Rash] : no skin rash [Headache] : no headache [Dizziness] : no dizziness [Swollen Glands] : no swollen glands

## 2022-09-28 NOTE — ASSESSMENT
[facility]  [address] [facility] [address] [address] [phone]                                 Progress Note    Telephone Assessment and management services used related to COVID-19 precautions currently in place     Verified patient identity:  [x] Yes    Patient location:  Home    Time of the Session:  56 min    Name:  Sandra Saldaña     Date:  4/29/2020      Session Type (CPT code):  [] 68939 [] 68960 [x] 79851 [] 85014 [] 70888     [x]  Other: : Video     Others Present:  None    Intervention:  CBT (Cognitive Behavioral Therapy), Insight Oriented, Solution Focused Therapy    Mental Status Exam: [x] Improved  [] Same  [] Regressed    Suicide/Homicide/Violence Ideation:  No    Current Outpatient Medications   Medication Sig   • omeprazole (PRILOSEC) 20 MG capsule Take 1 capsule (20 mg total) by mouth daily.   • fludrocortisone (FLORINEF) 0.1 MG tablet Take 1 Tab by mouth once per day.   • metoPROLOL succinate (TOPROL-XL) 25 MG 24 hr tablet Take 1 tablet by mouth daily.   • ivabradine (CORLANOR) 5 MG tablet Take 1 tablet in the AM and 1 and a half in the PM.   • ivabradine (CORLANOR) 5 MG tablet Take 1/2 tab every morning AND 1.5 Tabs once daily in the evening.   • ivabradine (CORLANOR) 5 MG tablet Take 1.25 mg AM, 7.5 mg PM   • fludrocortisone (FLORINEF) 0.1 MG tablet Take 0.5 tablets by mouth daily.   • ondansetron (ZOFRAN ODT) 4 MG disintegrating tablet Take 1 tablet (4 mg total) by mouth 4 times daily. Place on tongue and allow to dissolve.   • LORazepam (ATIVAN) 1 MG tablet Take 2.5 tablets by mouth daily.   • modafinil (PROVIGIL) 200 MG tablet Take 1 tablet by mouth 2 times daily.   • escitalopram (LEXAPRO) 10 MG tablet Take 1 Tab by mouth once per day.   • modafinil (PROVIGIL) 100 MG tablet Take 3 tablets by mouth daily.   • montelukast (SINGULAIR) 10 MG tablet Take 1 tablet by mouth nightly.   • naproxen  [High Risk Surgery - Intraperitoneal, Intrathoracic or Supringuinal Vascular Procedures] : High Risk Surgery - Intraperitoneal, Intrathoracic or Supringuinal Vascular Procedures - No (0) (NAPROSYN) 500 MG tablet Take 1 tablet by mouth 2 times daily as needed for Pain.   • methIMAzole (TAPAZOLE) 5 MG tablet Take 0.5 tablets (2.5 mg total) by mouth daily.   • cephalexin (KEFLEX) 250 MG capsule Take 1 capsule by mouth nightly.   • Selenium 200 MCG Cap Take 200 mcg by mouth daily.   • Polyethylene Glycol 400 (BLINK TEARS) 0.25 % Gel    • Propylene Glycol-Glycerin (SOOTHE) 0.6-0.6 % Solution Apply to eye daily.   • fexofenadine (ALLEGRA) 180 MG tablet Take 1 tablet by mouth 2 times daily.   • MELATONIN PO Take 2 mg by mouth nightly.   • Alum Hydroxide-Mag Carbonate (GAVISCON PO) Take 2 tablets by mouth as needed (acid reflux). Chewable tablet   • Liniments (SALONPAS PAIN RELIEF PATCH EX) Apply 1 Package topically nightly.   • escitalopram (LEXAPRO) 10 MG tablet Take 1 tablet by mouth daily. (Patient taking differently: Take 10 mg by mouth nightly. )   • metoPROLOL (TOPROL-XL) 25 MG 24 hr tablet Take 0.5 tablets by mouth daily.   • Magnesium 400 MG Cap Take 1 capsule by mouth 2 times daily.    • Coenzyme Q10 (COQ10 PO) Take 1 capsule by mouth daily.    • RIBOFLAVIN PO Take 1 capsule by mouth 2 times daily.    • cetirizine (ZYRTEC) 10 MG tablet Take 10 mg by mouth nightly.    • acetaminophen (TYLENOL) 500 MG tablet Take 2 tablets by mouth every 6 hours as needed for Pain.   • ranitidine (ZANTAC) 150 MG tablet Take 150 mg by mouth 2 times daily.    • Multiple Vitamins-Calcium CAPS Take 1 capsule by mouth daily.   • Cholecalciferol (VITAMIN D3 PO) Take 4,000 Units by mouth daily. Liquid medication     No current facility-administered medications for this visit.        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  Patient said: I feel better w/ respect to my depression and anxiety, I have beat my burnout for now.\" Patient presents with sadness, frustration, low energy, burnout at work.  Patient opened for change, patient's insight is good and patient is able to engage in therapy and participate.     Assessment:  Writer provided psychotherapy and gathered pertinent information from patient.  Writer provided therapy and counseling to patient. Writer expressed understanding, used active listening and continued working w/ patient on burnout prevention, mental hygiene and self care thru schedule, list and planning.            Response:  Pt is open and cooperative.  Patient shared personal information, strengths and feelings.  Patient denies suicidal/homicidal ideations, open, honest and oriented times 3, tired (moderate) and emotional (mild); agreeable to Treatment Plan.     Plan:  Treatment Plan goals continued:  Exercise on regular basis, implementing healthy diet, read self-help literature, keep journal, take medications if/as prescribed psychiatrist, decrease of depression and anxiety by 10%, take medications as prescribed; practicing practice meditation, positive thinking, mindfulness and spirituality, excercise, explore alternative home based vocational options.       Need for Community Resources Assessed:  Yes    Resources Needed:  No    If Yes, what resources:  N/A    Referral to psychiatrist:  No  Referral to PCP:  No    Treatment Plan:  []  New [x]  Unchanged []  Update  [x]  Review    Discharge Plan:  Strategies Discussed to Maintain Gains    PRIMARY DIAGNOSIS:  (Primary first, followed by secondary diagnosis) The primary encounter diagnosis was Generalized anxiety disorder. Diagnoses of Other  [Prior Cerebrovascular Accident or TIA] : Prior Cerebrovascular Accident or TIA - No (0) [Creatinine >= 2mg/dL (1 Point)] : Creatinine >= 2mg/dL - No (0) insomnia, Dysthymic disorder, and Work-related stress were also pertinent to this visit.    For Primary Diagnosis: 2 Moderate.     Next Appointment:  []  Next week [x]  Bi-weekly []  Monthly [] will call after knowing the schedule  [] Will call after IOP  [] Other  Provider:  Jayy Crisostomo M.S., LPC, CSAC, ICS               [Insulin-dependent Diabetic (1 Point)] : Insulin-dependent Diabetic - No (0) [2] : 2 , RCRI Class: III, Risk of Post-Op Cardiac Complications: 10.1%, 95% CI for Risk Estimate: 8.1% - 12.6% [Patient Optimized for Surgery] : Patient optimized for surgery [No Further Testing Recommended] : no further testing recommended [Modify anticoagulant treatment prior to procedure] : Modify anticoagulant treatment prior to procedure [Modify anti-platelet treatment prior to procedure] : Modify anti-platelet treatment prior to procedure [FreeTextEntry4] : acceptable medical condition for surgery\par hold  eliquis for 3 days  before procedure chf stable cad  stop  clopidogrel day before procedure  [FreeTextEntry5] : stop eliquis 3 days  before  procedure  [FreeTextEntry6] : stop clopidogrel day before  procedure

## 2022-09-29 ENCOUNTER — APPOINTMENT (OUTPATIENT)
Dept: OPHTHALMOLOGY | Facility: CLINIC | Age: 87
End: 2022-09-29

## 2022-09-29 ENCOUNTER — NON-APPOINTMENT (OUTPATIENT)
Age: 87
End: 2022-09-29

## 2022-09-29 PROCEDURE — 92012 INTRM OPH EXAM EST PATIENT: CPT

## 2022-09-29 PROCEDURE — 92134 CPTRZ OPH DX IMG PST SGM RTA: CPT

## 2022-10-03 ENCOUNTER — APPOINTMENT (OUTPATIENT)
Dept: ULTRASOUND IMAGING | Facility: CLINIC | Age: 87
End: 2022-10-03

## 2022-10-03 ENCOUNTER — RX RENEWAL (OUTPATIENT)
Age: 87
End: 2022-10-03

## 2022-10-03 ENCOUNTER — OUTPATIENT (OUTPATIENT)
Dept: OUTPATIENT SERVICES | Facility: HOSPITAL | Age: 87
LOS: 1 days | End: 2022-10-03
Payer: MEDICARE

## 2022-10-03 DIAGNOSIS — Z95.1 PRESENCE OF AORTOCORONARY BYPASS GRAFT: Chronic | ICD-10-CM

## 2022-10-03 DIAGNOSIS — S22.39XA FRACTURE OF ONE RIB, UNSPECIFIED SIDE, INITIAL ENCOUNTER FOR CLOSED FRACTURE: Chronic | ICD-10-CM

## 2022-10-03 DIAGNOSIS — M79.89 OTHER SPECIFIED SOFT TISSUE DISORDERS: ICD-10-CM

## 2022-10-03 DIAGNOSIS — Z98.89 OTHER SPECIFIED POSTPROCEDURAL STATES: Chronic | ICD-10-CM

## 2022-10-03 DIAGNOSIS — Z00.8 ENCOUNTER FOR OTHER GENERAL EXAMINATION: ICD-10-CM

## 2022-10-03 DIAGNOSIS — Z98.49 CATARACT EXTRACTION STATUS, UNSPECIFIED EYE: Chronic | ICD-10-CM

## 2022-10-03 PROCEDURE — 93971 EXTREMITY STUDY: CPT | Mod: 26,LT

## 2022-10-03 PROCEDURE — 93971 EXTREMITY STUDY: CPT

## 2022-10-06 ENCOUNTER — APPOINTMENT (OUTPATIENT)
Dept: FAMILY MEDICINE | Facility: CLINIC | Age: 87
End: 2022-10-06

## 2022-10-07 ENCOUNTER — FORM ENCOUNTER (OUTPATIENT)
Age: 87
End: 2022-10-07

## 2022-10-14 ENCOUNTER — APPOINTMENT (OUTPATIENT)
Dept: PHYSICAL MEDICINE AND REHAB | Facility: CLINIC | Age: 87
End: 2022-10-14

## 2022-10-14 VITALS
OXYGEN SATURATION: 100 % | HEIGHT: 69 IN | SYSTOLIC BLOOD PRESSURE: 148 MMHG | BODY MASS INDEX: 27.7 KG/M2 | HEART RATE: 78 BPM | WEIGHT: 187 LBS | DIASTOLIC BLOOD PRESSURE: 80 MMHG

## 2022-10-14 PROCEDURE — 99214 OFFICE O/P EST MOD 30 MIN: CPT

## 2022-10-14 PROCEDURE — 99072 ADDL SUPL MATRL&STAF TM PHE: CPT

## 2022-10-14 NOTE — PHYSICAL EXAM
[FreeTextEntry1] : Constitutional: In NAD, calm and cooperative\par Non obese\par HEENT: NCAT, Anicteric sclera, no lid-lag\par Cardio: Extremities appear pink and well perfused, no peripheral edema\par Respiratory: Normal respiratory effort on room air, no accessory muscle use\par Skin: no rashes seen on exposed skin, no visible abrasions\par Psych: Normal affect, intact judgment and insight\par Neuro: Awake, alert and oriented x 3, see below for focused neurological exam\par \par MSK (Back)\par Inspection: no gross swelling identified\par Palpation: Tenderness of the bilateral lower lumbar paraspinals\par ROM: Pain with 30' lumbar flexion, pain with 5' lumbar extension\par Strength: 5/5 strength in bilateral lower extremities\par Reflexes: 1+ R Patella, ABSENT LEFT PATELLA reflex, absent Achilles reflex bilaterally\par Sensation: SILT, decreased to pinprick in L S1 dermatome\par Able to heel walk, toe walking is limited.\par \par Special tests:\par SLR: negative bilaterally\par Slump: negative bilaterally\par Alyson's: + back pain bilaterally\par FADIR: negative bilaterally\par Facet loading: positive bilaterally\par \par

## 2022-10-14 NOTE — HISTORY OF PRESENT ILLNESS
[FreeTextEntry1] : 89 y.o. M w/ h/o CAD s/p 2 stents, CABG and ICD (now disconnected) on Plavix and Eliquis, CKD 3b, HTN, AF and DM (resolved) presents to office w/ c/o CLBP -> R leg returns to office for f/u.  Pt. states that his right > left leg pain has worsened over the last several weeks.  MRI contraindicated.  No CT L-spine.  Pt. has been seeing outside pain MD who administered lumbar TPIs which offered slight relief of sxs.  His PMD has cleared him for LESI.  Of note, pt. has been taking up to 10 tabs of OTC acetaminophen per day.

## 2022-10-14 NOTE — ASSESSMENT
[FreeTextEntry1] : 89 y.o. M w/ h/o CAD s/p 2 stents, CABG, HF (EF 30-35%) and ICD (now disconnected) on Plavix and Eliquis, CKD 3b, HTN, AF and DM (resolved) with likely lumbar spinal stenosis and neurogenic claudication.  I spent most of today's office visit (30 min) discussing etiology, pathogenesis and further non-operative management.  X-rays L-spine previously reviewed c/w degenerative rotoscoliosis and short pedicles.  MRI remains contraindicated in setting of ICD.  Again, discussed R/B/A to LESI under fluoroscopy including bleeding, infection and nerve damage.  Even though the patient's PMD has cleared him for the procedure, I stated that I am not comfortable performing LESI on him given his medical comorbidities.  PMD stated hold Plavix one day prior to LESI which is not consistent with our professional society guidelines.   Discussed R/B/A to trial of low dose gabapentin 100 mg; 1-2 caps po qhs as tolerated.  Educated pt. on risks of CNS depression and fluid retention.  Gave new Rx for P.T. for modalities, gentle ROM, stretching and stabilization exercises.  Again, cautioned patient on chronic use of high dose acetaminophen 2' GI and renal toxicities.  PO NSAIDs remain absolutely contraindicated 2' patient's CV hx.  May trial OTC Lidoderm patches as directed.  Pt. is in agreement with plan.  All questions answered.  RTC 6-8 weeks.

## 2022-10-20 ENCOUNTER — APPOINTMENT (OUTPATIENT)
Dept: CARDIOLOGY | Facility: CLINIC | Age: 87
End: 2022-10-20

## 2022-10-20 ENCOUNTER — NON-APPOINTMENT (OUTPATIENT)
Age: 87
End: 2022-10-20

## 2022-10-20 VITALS
DIASTOLIC BLOOD PRESSURE: 66 MMHG | HEART RATE: 74 BPM | WEIGHT: 184 LBS | BODY MASS INDEX: 27.25 KG/M2 | HEIGHT: 69 IN | SYSTOLIC BLOOD PRESSURE: 132 MMHG

## 2022-10-20 PROCEDURE — 99214 OFFICE O/P EST MOD 30 MIN: CPT | Mod: 25

## 2022-10-20 PROCEDURE — 93000 ELECTROCARDIOGRAM COMPLETE: CPT

## 2022-10-20 NOTE — DISCUSSION/SUMMARY
[FreeTextEntry1] : Pt is an 89 y/o M with PMH CAD s/p MI 1973, 3V CABG 1996 at [facility], PCI 2019 and 2020, ICD [facility],  AF on coumadin\par \par ICM:\par nuc stress test was negative for ischemia\par c/w lasix \par c/w metoprolol succ and Entresto \par cannot tolerate coreg due to hypotension\par \par CAD:\par c/w plavix\par c/w statin and fenofibrate 160, goal LDL <70\par c/w Entresto\par BP high recently - increase metoprolol to 50mg \par c/w lasix - pt appears euvolemic\par \par AF:\par on Eliquis 2.5mg bid - he denies any bleeding issues\par c/w dig and metoprolol\par \par Pt will return in 4-6 mnths or sooner as needed but I encouraged communication via phone or portal if necessary.\par The described plan was discussed with the pt.  All questions and concerns were addressed to the best of my knowledge.

## 2022-10-20 NOTE — PHYSICAL EXAM
[Well Developed] : well developed [Well Nourished] : well nourished [No Acute Distress] : no acute distress [Normal Venous Pressure] : normal venous pressure [No Carotid Bruit] : no carotid bruit [Normal S1, S2] : normal S1, S2 [No Murmur] : no murmur [No Rub] : no rub [No Gallop] : no gallop [Clear Lung Fields] : clear lung fields [Good Air Entry] : good air entry [No Respiratory Distress] : no respiratory distress  [Soft] : abdomen soft [Non Tender] : non-tender [No Masses/organomegaly] : no masses/organomegaly [Normal Bowel Sounds] : normal bowel sounds [Normal Gait] : normal gait [No Edema] : no edema [No Cyanosis] : no cyanosis [No Clubbing] : no clubbing [No Varicosities] : no varicosities [No Rash] : no rash [No Skin Lesions] : no skin lesions [Moves all extremities] : moves all extremities [No Focal Deficits] : no focal deficits [Normal Speech] : normal speech [Alert and Oriented] : alert and oriented [Normal memory] : normal memory [General Appearance - Well Developed] : well developed [Normal Appearance] : normal appearance [Well Groomed] : well groomed [General Appearance - Well Nourished] : well nourished [No Deformities] : no deformities [General Appearance - In No Acute Distress] : no acute distress [Normal Conjunctiva] : the conjunctiva exhibited no abnormalities [Eyelids - No Xanthelasma] : the eyelids demonstrated no xanthelasmas [Normal Oral Mucosa] : normal oral mucosa [No Oral Pallor] : no oral pallor [No Oral Cyanosis] : no oral cyanosis [Exaggerated Use Of Accessory Muscles For Inspiration] : no accessory muscle use [Respiration, Rhythm And Depth] : normal respiratory rhythm and effort [Auscultation Breath Sounds / Voice Sounds] : lungs were clear to auscultation bilaterally [Heart Rate And Rhythm] : heart rate and rhythm were normal [Heart Sounds] : normal S1 and S2 [Murmurs] : no murmurs present [Arterial Pulses Normal] : the arterial pulses were normal [Edema] : no peripheral edema present [Abdomen Soft] : soft [Abdomen Tenderness] : non-tender [Abdomen Mass (___ Cm)] : no abdominal mass palpated [Abnormal Walk] : normal gait [Gait - Sufficient For Exercise Testing] : the gait was sufficient for exercise testing [Nail Clubbing] : no clubbing of the fingernails [Cyanosis, Localized] : no localized cyanosis [Petechial Hemorrhages (___cm)] : no petechial hemorrhages [] : no ischemic changes [Oriented To Time, Place, And Person] : oriented to person, place, and time [Affect] : the affect was normal [Impaired Insight] : insight and judgment were intact [Mood] : the mood was normal [No Anxiety] : not feeling anxious [FreeTextEntry1] : no JVD or bruits

## 2022-10-20 NOTE — REASON FOR VISIT
[Cardiac Failure] : cardiac failure [Hypertension] : hypertension [Follow-Up - Clinic] : a clinic follow-up of [Cardiomyopathy] : cardiomyopathy [Chest Pain] : chest pain [Coronary Artery Disease] : coronary artery disease [Dyspnea] : dyspnea

## 2022-10-20 NOTE — HISTORY OF PRESENT ILLNESS
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, 3V CABG 1996 at [facility], PCI 2019 and 2020, mod to sev ICM, ICD [facility] (tachyarrhythmia therapies are now turned off),  AF on coumadin\par cardiac cath 06/2019 patent graft to LAD, mod disease in SVG to OM, D1 s/p ELOY x2\par cardiac cath 06/2020 ELOY to ostial/prox SVG to OM\par Of note anginal symptoms were "feeling exhausted", GARDNER\par 2022 he was found to have lead fracture - pt elected not to have any intervention - tachyarrhythmia therapies were turned off\par COVID+ 05/2022\par \par Pt tells me that he has been having a lot of back and leg pain.  With exertion he notices SOB but he is also in a lot of pain with walking.  Hr thinks his breathing is at his baseline.  He denies CP, diaphoresis, palpitations, dizziness, syncope, LE edema, PND, orthopnea. \par \par TTE 07/2021 EF 30-35%, mild MR/TR/PI, mild AS, mild-mod AI\par TTE 06/2022 EF 30-35%\par Nuc stress test 01/2022 large, severe mostly fixed defects apical, apical ant, distal ant, mid anterior, inferior walls

## 2022-10-20 NOTE — REVIEW OF SYSTEMS
[Feeling Fatigued] : feeling fatigued [Negative] : Heme/Lymph [Dyspnea on exertion] : dyspnea during exertion [SOB] : no shortness of breath [Chest Discomfort] : no chest discomfort [Leg Claudication] : no intermittent leg claudication [Palpitations] : no palpitations [Syncope] : no syncope

## 2022-10-27 ENCOUNTER — APPOINTMENT (OUTPATIENT)
Dept: OPHTHALMOLOGY | Facility: CLINIC | Age: 87
End: 2022-10-27

## 2022-10-27 ENCOUNTER — NON-APPOINTMENT (OUTPATIENT)
Age: 87
End: 2022-10-27

## 2022-10-27 PROCEDURE — 92134 CPTRZ OPH DX IMG PST SGM RTA: CPT

## 2022-10-27 PROCEDURE — 92012 INTRM OPH EXAM EST PATIENT: CPT

## 2022-11-11 ENCOUNTER — RX RENEWAL (OUTPATIENT)
Age: 87
End: 2022-11-11

## 2022-11-30 ENCOUNTER — RX RENEWAL (OUTPATIENT)
Age: 87
End: 2022-11-30

## 2022-12-01 ENCOUNTER — RX RENEWAL (OUTPATIENT)
Age: 87
End: 2022-12-01

## 2022-12-08 ENCOUNTER — APPOINTMENT (OUTPATIENT)
Dept: OPHTHALMOLOGY | Facility: CLINIC | Age: 87
End: 2022-12-08

## 2022-12-08 ENCOUNTER — NON-APPOINTMENT (OUTPATIENT)
Age: 87
End: 2022-12-08

## 2022-12-08 PROCEDURE — 92134 CPTRZ OPH DX IMG PST SGM RTA: CPT

## 2022-12-08 PROCEDURE — 92012 INTRM OPH EXAM EST PATIENT: CPT

## 2022-12-11 ENCOUNTER — NON-APPOINTMENT (OUTPATIENT)
Age: 87
End: 2022-12-11

## 2022-12-12 ENCOUNTER — RX RENEWAL (OUTPATIENT)
Age: 87
End: 2022-12-12

## 2022-12-15 ENCOUNTER — APPOINTMENT (OUTPATIENT)
Dept: PHYSICAL MEDICINE AND REHAB | Facility: CLINIC | Age: 87
End: 2022-12-15

## 2022-12-15 VITALS
DIASTOLIC BLOOD PRESSURE: 73 MMHG | WEIGHT: 184 LBS | BODY MASS INDEX: 27.25 KG/M2 | SYSTOLIC BLOOD PRESSURE: 164 MMHG | HEIGHT: 69 IN | HEART RATE: 74 BPM

## 2022-12-15 DIAGNOSIS — M47.816 SPONDYLOSIS W/OUT MYELOPATHY OR RADICULOPATHY, LUMBAR REGION: ICD-10-CM

## 2022-12-15 DIAGNOSIS — M48.062 SPINAL STENOSIS, LUMBAR REGION WITH NEUROGENIC CLAUDICATION: ICD-10-CM

## 2022-12-15 PROCEDURE — 99214 OFFICE O/P EST MOD 30 MIN: CPT

## 2022-12-15 PROCEDURE — 99072 ADDL SUPL MATRL&STAF TM PHE: CPT

## 2022-12-15 NOTE — PHYSICAL EXAM
[FreeTextEntry1] : Constitutional: NAD, calm and cooperative\par Non obese\par HEENT: NCAT, Anicteric sclera, no lid-lag\par Cardio: Extremities appear pink and well perfused, no peripheral edema\par Respiratory: Normal respiratory effort on room air, no accessory muscle use\par Skin: no rashes seen on exposed skin, no visible abrasions\par Psych: Normal affect, intact judgment and insight\par Neuro: Awake, alert and oriented x 3, see below for focused neurological exam\par \par MSK (Back)\par Inspection: no gross swelling identified\par Palpation: Tenderness of the bilateral lower lumbar paraspinals\par ROM: Pain with 30' lumbar flexion, pain with 5-10' lumbar extension (static)\par Strength: 4/5 R TA; 4+/5 L TA; 5/5 PF B/L\par Reflexes: 1+ B/L PATELLA reflex, absent Achilles reflex bilaterally (static)\par Sensation: SILT, decreased to pinprick in L S1 dermatome\par Able to heel walk, toe walking is limited.\par \par Special tests:\par SLR: negative bilaterally\par Slump: negative bilaterally\par Alyson's: + back pain bilaterally\par FADIR: negative bilaterally\par Facet loading: positive bilaterally\par \par

## 2022-12-15 NOTE — ASSESSMENT
[FreeTextEntry1] : 89 y.o. M w/ h/o CAD s/p 2 stents, CABG, HF (EF 30-35%) and ICD (now disconnected) on Plavix and Eliquis, CKD 3b, HTN, AF and DM (resolved) with likely lumbar spinal stenosis and neurogenic claudication.  I spent most of today's office visit (25 min) discussing etiology, pathogenesis and further non-operative management.  X-rays L-spine previously reviewed c/w degenerative rotoscoliosis and short pedicles.  MRI remains contraindicated in setting of ICD.  PO NSAIDs remain absolutely contraindicated 2' patient's CV hx.  Discussed R/B/A to continued treatment w/ low dose gabapentin 100 mg; 1-2 caps po qhs as tolerated.  Educated pt. on risks of CNS depression and fluid retention.  I advised the patient against concomitant use of medications such as tizanidine secondary to the risk of cumulative CNS depression which may precipitate falls and fracture injuries.  Again, cautioned patient against chronic use of high dose acetaminophen 2' GI and renal toxicities.  Pt. is in agreement with plan.  All questions answered.  RTC 3 months.

## 2022-12-15 NOTE — HISTORY OF PRESENT ILLNESS
[FreeTextEntry1] : 89 y.o. M w/ h/o CAD s/p 2 stents, CABG, HF (EF 30-35%) and ICD (now disconnected) on Plavix and Eliquis, CKD 3b, HTN, AF and DM (resolved) with likely lumbar spinal stenosis and neurogenic claudication returns to office for f/u.  Pt. states that the gabapentin 200 mg po qhs is helpful.  His PMD has Rx'd tizanidine 4 mg which he also takes at night.  His exercise tolerance is limited both by his GARDNER/SOB and his CLBP.  Pt. has been going to P.T. which has been helpful.

## 2022-12-16 ENCOUNTER — RX RENEWAL (OUTPATIENT)
Age: 87
End: 2022-12-16

## 2022-12-27 ENCOUNTER — RX RENEWAL (OUTPATIENT)
Age: 87
End: 2022-12-27

## 2023-01-10 ENCOUNTER — APPOINTMENT (OUTPATIENT)
Dept: CARDIOLOGY | Facility: CLINIC | Age: 88
End: 2023-01-10
Payer: MEDICARE

## 2023-01-10 ENCOUNTER — APPOINTMENT (OUTPATIENT)
Dept: ELECTROPHYSIOLOGY | Facility: CLINIC | Age: 88
End: 2023-01-10
Payer: MEDICARE

## 2023-01-10 ENCOUNTER — NON-APPOINTMENT (OUTPATIENT)
Age: 88
End: 2023-01-10

## 2023-01-10 ENCOUNTER — APPOINTMENT (OUTPATIENT)
Dept: ELECTROPHYSIOLOGY | Facility: CLINIC | Age: 88
End: 2023-01-10

## 2023-01-10 VITALS
OXYGEN SATURATION: 94 % | WEIGHT: 188 LBS | DIASTOLIC BLOOD PRESSURE: 62 MMHG | HEART RATE: 76 BPM | SYSTOLIC BLOOD PRESSURE: 150 MMHG | HEIGHT: 69 IN | BODY MASS INDEX: 27.85 KG/M2

## 2023-01-10 VITALS
HEIGHT: 69 IN | WEIGHT: 188 LBS | BODY MASS INDEX: 27.85 KG/M2 | SYSTOLIC BLOOD PRESSURE: 159 MMHG | DIASTOLIC BLOOD PRESSURE: 61 MMHG | HEART RATE: 74 BPM

## 2023-01-10 PROCEDURE — 93000 ELECTROCARDIOGRAM COMPLETE: CPT

## 2023-01-10 PROCEDURE — 93290 INTERROG DEV EVAL ICPMS IP: CPT | Mod: 26

## 2023-01-10 PROCEDURE — 93283 PRGRMG EVAL IMPLANTABLE DFB: CPT

## 2023-01-10 NOTE — PHYSICAL EXAM
[General Appearance - Well Developed] : well developed [General Appearance - Well Nourished] : well nourished [Heart Rate And Rhythm] : heart rate and rhythm were normal [Edema] : no peripheral edema present [Systolic grade ___/6] : A grade [unfilled]/6 systolic murmur was heard. [Left Infraclavicular] : left infraclavicular area [Well-Healed] : well-healed [Nail Clubbing] : no clubbing of the fingernails [Cyanosis, Localized] : no localized cyanosis [Petechial Hemorrhages (___cm)] : no petechial hemorrhages [] : no ischemic changes [FreeTextEntry1] : rales at bases, bilaterally

## 2023-01-10 NOTE — REVIEW OF SYSTEMS
[Feeling Fatigued] : feeling fatigued [SOB] : shortness of breath [Dyspnea on exertion] : dyspnea during exertion [Negative] : Neurological [Chest Discomfort] : no chest discomfort [Palpitations] : no palpitations [Cough] : no cough [Wheezing] : no wheezing

## 2023-01-10 NOTE — PROCEDURE
[No] : not [NSR] : normal sinus rhythm [Pacemaker] : pacemaker [___V @] : [unfilled] V [Lead Imp:  ___ohms] : lead impedance was [unfilled] ohms [Sensing Amplitude ___mv] : sensing amplitude was [unfilled] mv [None] : none [Asense-Vsense ___ %] : Asense-Vsense [unfilled]% [de-identified] : Medtronic [de-identified] : Kanu [de-identified] : 8-21-14 [de-identified] : AAI-DDD [de-identified] : 50 [de-identified] : 2.2 yrs longevity [de-identified] : Optivol is WNL.

## 2023-01-10 NOTE — REASON FOR VISIT
[Cardiac Failure] : cardiac failure [Hypertension] : hypertension [Follow-Up - Clinic] : a clinic follow-up of [Cardiomyopathy] : cardiomyopathy [Chest Pain] : chest pain [Coronary Artery Disease] : coronary artery disease [Dyspnea] : dyspnea [Symptom and Test Evaluation] : symptom and test evaluation

## 2023-01-10 NOTE — DISCUSSION/SUMMARY
[Routine Follow-up in 3-4 months] : routine follow-up in 3-4 months [Patient] : the patient [FreeTextEntry1] : Follow up with cardiology today.\par Plan discussed with Dr. Oh.\par

## 2023-01-10 NOTE — HISTORY OF PRESENT ILLNESS
[SOB] : dyspnea [de-identified] : Pt has known RV lead fracture since 7/27/22, ICD tachy detections are shut off.\par Pt decided against having new RV lead placement or extraction of device.  This discussion was also with his son and Dr. Oh. He is in NSR 99% and the device had never given him ICD therapy.\par \par Today he reports SOB with minimal activity.

## 2023-01-10 NOTE — ASSESSMENT
[FreeTextEntry1] : Pt has RV lead fracture, currently all tachy detections are off.\par Rhythm is normal sinus 99.8 %\par He has history of CAD, cardiomyopathy, last LVEF 6/2022 30-35%, mild MR, mild AS.\par \par At this time he states he may consider having a new RV lead implanted but wants to address his SOB first.  He denies any CP, palpitations or syncope.\par \par

## 2023-01-13 LAB
ALBUMIN SERPL ELPH-MCNC: 4.7 G/DL
ALP BLD-CCNC: 93 U/L
ALT SERPL-CCNC: 24 U/L
ANION GAP SERPL CALC-SCNC: 13 MMOL/L
AST SERPL-CCNC: 28 U/L
BASOPHILS # BLD AUTO: 0.04 K/UL
BASOPHILS NFR BLD AUTO: 0.8 %
BILIRUB SERPL-MCNC: 0.4 MG/DL
BUN SERPL-MCNC: 38 MG/DL
CALCIUM SERPL-MCNC: 10.4 MG/DL
CHLORIDE SERPL-SCNC: 103 MMOL/L
CHOLEST SERPL-MCNC: 157 MG/DL
CO2 SERPL-SCNC: 26 MMOL/L
CREAT SERPL-MCNC: 1.79 MG/DL
EGFR: 35 ML/MIN/1.73M2
EOSINOPHIL # BLD AUTO: 0.38 K/UL
EOSINOPHIL NFR BLD AUTO: 8 %
ESTIMATED AVERAGE GLUCOSE: 160 MG/DL
FOLATE SERPL-MCNC: >20 NG/ML
GLUCOSE SERPL-MCNC: 191 MG/DL
HBA1C MFR BLD HPLC: 7.2 %
HCT VFR BLD CALC: 47.1 %
HDLC SERPL-MCNC: 34 MG/DL
HGB BLD-MCNC: 14.7 G/DL
IMM GRANULOCYTES NFR BLD AUTO: 0.4 %
LDLC SERPL CALC-MCNC: 61 MG/DL
LYMPHOCYTES # BLD AUTO: 1.79 K/UL
LYMPHOCYTES NFR BLD AUTO: 37.7 %
MAGNESIUM SERPL-MCNC: 2.2 MG/DL
MAN DIFF?: NORMAL
MCHC RBC-ENTMCNC: 29.1 PG
MCHC RBC-ENTMCNC: 31.2 GM/DL
MCV RBC AUTO: 93.3 FL
MONOCYTES # BLD AUTO: 0.75 K/UL
MONOCYTES NFR BLD AUTO: 15.8 %
NEUTROPHILS # BLD AUTO: 1.77 K/UL
NEUTROPHILS NFR BLD AUTO: 37.3 %
NONHDLC SERPL-MCNC: 122 MG/DL
PLATELET # BLD AUTO: 167 K/UL
POTASSIUM SERPL-SCNC: 5.1 MMOL/L
PROT SERPL-MCNC: 7.3 G/DL
RBC # BLD: 5.05 M/UL
RBC # FLD: 13.6 %
SODIUM SERPL-SCNC: 142 MMOL/L
TRIGL SERPL-MCNC: 306 MG/DL
TSH SERPL-ACNC: 1.37 UIU/ML
VIT B12 SERPL-MCNC: 468 PG/ML
WBC # FLD AUTO: 4.75 K/UL

## 2023-01-15 ENCOUNTER — RX RENEWAL (OUTPATIENT)
Age: 88
End: 2023-01-15

## 2023-01-19 ENCOUNTER — OUTPATIENT (OUTPATIENT)
Dept: OUTPATIENT SERVICES | Facility: HOSPITAL | Age: 88
LOS: 1 days | End: 2023-01-19
Payer: MEDICARE

## 2023-01-19 ENCOUNTER — APPOINTMENT (OUTPATIENT)
Dept: RADIOLOGY | Facility: CLINIC | Age: 88
End: 2023-01-19
Payer: MEDICARE

## 2023-01-19 DIAGNOSIS — Z98.89 OTHER SPECIFIED POSTPROCEDURAL STATES: Chronic | ICD-10-CM

## 2023-01-19 DIAGNOSIS — I50.22 CHRONIC SYSTOLIC (CONGESTIVE) HEART FAILURE: ICD-10-CM

## 2023-01-19 DIAGNOSIS — S22.39XA FRACTURE OF ONE RIB, UNSPECIFIED SIDE, INITIAL ENCOUNTER FOR CLOSED FRACTURE: Chronic | ICD-10-CM

## 2023-01-19 DIAGNOSIS — Z98.49 CATARACT EXTRACTION STATUS, UNSPECIFIED EYE: Chronic | ICD-10-CM

## 2023-01-19 DIAGNOSIS — Z95.1 PRESENCE OF AORTOCORONARY BYPASS GRAFT: Chronic | ICD-10-CM

## 2023-01-19 PROCEDURE — 71046 X-RAY EXAM CHEST 2 VIEWS: CPT

## 2023-01-19 PROCEDURE — 71046 X-RAY EXAM CHEST 2 VIEWS: CPT | Mod: 26

## 2023-01-22 ENCOUNTER — NON-APPOINTMENT (OUTPATIENT)
Age: 88
End: 2023-01-22

## 2023-01-23 ENCOUNTER — NON-APPOINTMENT (OUTPATIENT)
Age: 88
End: 2023-01-23

## 2023-01-23 ENCOUNTER — APPOINTMENT (OUTPATIENT)
Dept: CARDIOLOGY | Facility: CLINIC | Age: 88
End: 2023-01-23
Payer: MEDICARE

## 2023-01-23 VITALS
SYSTOLIC BLOOD PRESSURE: 156 MMHG | WEIGHT: 188 LBS | BODY MASS INDEX: 27.85 KG/M2 | OXYGEN SATURATION: 94 % | HEIGHT: 69 IN | HEART RATE: 72 BPM | DIASTOLIC BLOOD PRESSURE: 70 MMHG

## 2023-01-23 PROCEDURE — 99214 OFFICE O/P EST MOD 30 MIN: CPT | Mod: 25

## 2023-01-23 PROCEDURE — 93306 TTE W/DOPPLER COMPLETE: CPT

## 2023-01-23 PROCEDURE — 93000 ELECTROCARDIOGRAM COMPLETE: CPT

## 2023-01-23 NOTE — DISCUSSION/SUMMARY
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, 3V CABG 1996 at [facility], PCI 2019 and 2020, ICD [facility],  AF on coumadin p/w GARDNER, diaphoresis\par I encouraged ER evaluation if symptoms persist or worsen\par check CXR\par check labs - c/w current dose of lasix and if Cr stable consider increasing dose\par repeat TTE\par short f/u\par \par ICM:\par nuc stress test was negative for ischemia\par c/w lasix \par c/w metoprolol succ and Entresto \par cannot tolerate coreg due to hypotension\par \par CAD:\par c/w plavix\par c/w statin and fenofibrate 160, goal LDL <70\par c/w Entresto\par BP high recently - increase metoprolol to 50mg \par c/w lasix - pt appears euvolemic\par \par AF:\par on Eliquis 2.5mg bid - he denies any bleeding issues\par c/w dig and metoprolol\par \par Pt will return in 2-4weeks or sooner as needed but I encouraged communication via phone or portal if necessary.\par The described plan was discussed with the pt.  All questions and concerns were addressed to the best of my knowledge.

## 2023-01-23 NOTE — HISTORY OF PRESENT ILLNESS
[FreeTextEntry1] : Pt is an 89 y/o M with PMH CAD s/p MI 1973, 3V CABG 1996 at [facility], PCI 2019 and 2020, mod to sev ICM, ICD [facility] (tachyarrhythmia therapies are now turned off),  AF on coumadin\par cardiac cath 06/2019 patent graft to LAD, mod disease in SVG to OM, D1 s/p ELOY x2\par cardiac cath 06/2020 ELOY to ostial/prox SVG to OM\par Of note anginal symptoms were "feeling exhausted", GARDNER\par 2022 he was found to have lead fracture - pt elected not to have any intervention - tachyarrhythmia therapies were turned off\par \par Today pt tells me that he has been very SOB for the past month.  SOB occurs when he exerts himself. He thinks his symptoms have improved but only minimally. He denies palpitations, dizziness, syncope, LE edema, PND, orthopnea.  Home -150's/70's, yesterday his BP was 190/70 - he goes out to eat on sat nights.  \par \par TTE 07/2021 EF 30-35%, mild MR/TR/PI, mild AS, mild-mod AI\par TTE 06/2022 EF 30-35%\par Nuc stress test 01/2022 large, severe mostly fixed defects apical, apical ant, distal ant, mid anterior, inferior walls

## 2023-01-23 NOTE — REASON FOR VISIT
[Symptom and Test Evaluation] : symptom and test evaluation [Cardiac Failure] : cardiac failure [Hypertension] : hypertension [Follow-Up - Clinic] : a clinic follow-up of [Cardiomyopathy] : cardiomyopathy [Chest Pain] : chest pain [Coronary Artery Disease] : coronary artery disease [Dyspnea] : dyspnea

## 2023-01-23 NOTE — DISCUSSION/SUMMARY
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, 3V CABG 1996 at [facility], PCI 2019 and 2020, ICD [facility] Tez,  AF on coumadin p/w GARDNER, diaphoresis\par Pt with known extensive heart disease and cardiomyopathy\par I encouraged ER evaluation if symptoms persist or worsen\par CXR was normal\par will increase lasix to 60mg (40mg tab 1.5 tabs qd)\par check labs in 2 weeks\par repeat TTE\par short f/u\par \par ICM:\par nuc stress test was negative for ischemia\par increase lasix to 60mg  \par c/w metoprolol succ and Entresto \par cannot tolerate coreg due to hypotension\par \par CAD:\par c/w plavix\par c/w statin and fenofibrate 160, goal LDL <70\par c/w Entresto\par BP high recently - increase metoprolol to 50mg \par c/w lasix - pt appears euvolemic\par \par AF:\par on Eliquis 2.5mg bid - he denies any bleeding issues\par c/w dig and metoprolol\par \par Pt will return in 1-2m or sooner as needed but I encouraged communication via phone or portal if necessary.\par The described plan was discussed with the pt.  All questions and concerns were addressed to the best of my knowledge.

## 2023-01-23 NOTE — HISTORY OF PRESENT ILLNESS
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  SOB occurs when he exerts himself.  He notes 2 episodes chest tightness and sweaty last tues.  His son wanted him to go to the ER but he refused.  He denies palpitations, dizziness, syncope, LE edema, PND, orthopnea.  Home BP yesterday 150's\par \par TTE 07/2021 EF 30-35%, mild MR/TR/PI, mild AS, mild-mod AI\par TTE 06/2022 EF 30-35%\par Nuc stress test 01/2022 large, severe mostly fixed defects apical, apical ant, distal ant, mid anterior, inferior walls

## 2023-01-26 ENCOUNTER — APPOINTMENT (OUTPATIENT)
Dept: PHYSICAL MEDICINE AND REHAB | Facility: CLINIC | Age: 88
End: 2023-01-26

## 2023-01-27 NOTE — ED ADULT TRIAGE NOTE - BP NONINVASIVE DIASTOLIC (MM HG)
Follow up with your OB doctor in  1 week for a  delivery or in 6 weeks for a vaginal delivery unless otherwise instructed. Call office for appointment. .  For breastfeeding support, you can contact our lactation specialists at [phone] or   [phone]. DIET  Eat a well balanced diet focusing on foods high in fiber and protein  Drink plenty of fluids especially water. To avoid constipation you may take a mild stool softener as recommended by your doctor or midwife. ACTIVITY  Gradually increase your activity. Resume exercise regimen only after advised by your doctor or midwife. Avoid lifting anything heavier than your baby or a gallon of milk until OK'd by your physician or midlife. Avoid driving until your doctor or midwife has given their approval.  [place] slowly from a lying to sitting and then a standing position. Climb stairs one at a time. Use caution when carrying your baby up and down the stairs. No sexual activity for 6 weeks or until advised by your doctor - Nothing in vagina: intercourse, tampons, or douching. Be prepared to discuss family planning at your follow-up OB visit. You may feel tired or have a lack of energy. You may continue your prenatal vitamin to replenish nutrients post delivery. Nap when infant naps to catch up on sleep. May return to work or school in 6 weeks or as directed by physician or midlife. Take pain medication as prescribed whenever you need them  Take care of yourself by sleeping/resting as much as possible. Let someone else care for you, your infant and housework as much as possible for a while. EMOTIONS  You may feed love, sad, teary, & overwhelmed. If infant will not stop crying, contact another adult for help or place infant in their crib on their back and take a break. NEVER shake your infant.     Call your doctor if you have unrelieved feelings of: inability to cope, anxiety, lack of interest in the infant/family, eating and sleeping disturbances,     BLEEDING  Vaginal bleeding will decrease in amount over the next few weeks. It should be like the end of your period like a brownish discharge. No different odor or color than a regular period. You will notice that as your activity increases, your flow may increase. This is your body's way of telling you, you need to take things easier and rest more often. Abdominal cramping should not get any worse than it is now. Take your pain medications as needed for the next few days. BREAST CARE  For breastfeeding moms:  If you become engorged, feeding may be more difficult or painful for 1-2 days. You may find it helpful to hand express some milk so that the infant can latch on more easily. While breastfeeding, continue to take your prenatal vitamins as directed by your doctor or midwife. Only take medications verified as safe for breastfeeding. If you start any new medications other than the ones you have had in the hospital, contact your pediatrician to see if they are safe for breastfeeding. Wear a support bra 24/7. You can pump your milk after breast feeding and freeze milk for six months in the freezer. When you are ready to use it, thaw it out in the refrigerator and use in 24 hours. Label the containers made for breast milk with day and time of pumping. Establish breast feeding for 2 weeks before you pump breast milk to save. Use your lanolin ointment/cream on your nipples after baby nurses. You need not wipe it off when baby nurses again. There are nipple shields and disks for sore nipples. Babies should eat every two to three hours. Avoid gassy foods (cabbage, onions, saurkraut, baked beans, cauliflower, broccoli) and spicy [place] or [place] foods. They might give the baby gas or make your milk taste funny to baby.  But if you have a craving, eat the food and see if it affects the baby and if it does, delete it from your diet. If it does not affect the baby, go ahead and eat it. Avoid caffeine at bedtime. (chocolate has a lot of caffeine) if the baby is sensitive to it. For non-breastfeeding moms:  You may apply ice packs to your breasts over your bra for twenty minutes at a time for comfort. Avoid stimulation to your breasts, when showering allow the water to strike your back not your breasts. Wear a good fitting bra until your milk dries, such as a sports bra. If your breasts are very sore, buy a cabbage and wet some of the inner leaves and place in your bra over your breasts. Your breasts may feel warm when your milk comes in. This is normal.    INCISIONAL CARE / TORIN CARE  Clean your incision in the shower with mild soap. After shower pat the incision area dry and leave open to air. If used, Steri-stipes should fall off in shower by 2 weeks. If used, M[place] should be removed by the OB in office by 1 week. If used/ordered, an abdominal binder may provide support for your incision. Use the torin-bottle after toileting until bleeding stops. It promotes healing and prevents infection. You are prone to urinary tract infections after a delivery ( c section or vaginal delivery). Drink a lot of fluids. Take a shower instead of a tub bath until bleeding stops. Cleanse your perineum from front to back  If used, stitches or internal clips will dissolve in 4-6 weeks. You may use a sitz bath or soak in a clean tub as needed for comfort. Kegel exercises will help restore bladder control. You may use cool compress on incision site. SWELLING   It takes about 2 weeks to get rid of all of the extra fluid you have from having a baby. Your feet and hands may swell up more than they are now. Keep your legs elevated when sitting or lying. When wearing stocking or socks, make sure they are not too tight. WHEN TO CALL THE DOCTOR  If you have a temp of 100.4 or more.    Your abdomen is tender to touch.  You are passing blood clots bigger than the size of a lemon. If you are experiencing extreme weakness or dizziness. If you are having flu-like symptoms such as achy muscles or joints. If you have pain that cannot be relieved. You have persistent burning with urination or frequency. You have swelling, bleeding, drainage, foul odor, redness, or warmth in/around your incision or stitches. You have a red, warm, tender area in you calf. Call if you have concerns about your well-being or if you feel you may be showing signs of post partum depression, or have thoughts of harming yourself or infant. Increased pain at the site of the episiotomy. Difficulty urinating. Difficulty breathing with or without chest pain. Headache not relieved by pain meds. If you are saturating more than one maxi pad in an hour, foul smelling vaginal discharge, sudden continuing increased vaginal bleeding with or without clots. If you develop a warm, red, tender area on your breast, have nipple discharge which is foul smelling or contains pus, or develop a fever. NEVER SHAKE A BABY PROMISE. Shaking can kill a baby. It can also cause seizures, brain damage, learning problems,  Cerebral palsy, blindness and other serious health and developmental problems. I (have seen) (am aware of) the video about shaking a baby and understand that shaking a baby is a serious form of child abuse. I PROMISE NEVER TO SHAKE MY BABY    I understand that caregivers other than the mother often shakes babies. I also promise to discuss the dangers of shaking a baby with everyone who takes care of my baby.   I promise to tell anyone who care for my baby to never, never shake my baby.      ___________________________                                  ________________________             Mother's signature Nurse 's signature 60

## 2023-01-28 ENCOUNTER — APPOINTMENT (OUTPATIENT)
Dept: OPHTHALMOLOGY | Facility: CLINIC | Age: 88
End: 2023-01-28
Payer: MEDICARE

## 2023-01-28 ENCOUNTER — NON-APPOINTMENT (OUTPATIENT)
Age: 88
End: 2023-01-28

## 2023-01-28 PROCEDURE — 92012 INTRM OPH EXAM EST PATIENT: CPT

## 2023-01-28 PROCEDURE — 92134 CPTRZ OPH DX IMG PST SGM RTA: CPT

## 2023-02-11 ENCOUNTER — RX RENEWAL (OUTPATIENT)
Age: 88
End: 2023-02-11

## 2023-02-15 ENCOUNTER — APPOINTMENT (OUTPATIENT)
Dept: FAMILY MEDICINE | Facility: CLINIC | Age: 88
End: 2023-02-15
Payer: MEDICARE

## 2023-02-15 VITALS
OXYGEN SATURATION: 96 % | WEIGHT: 190 LBS | DIASTOLIC BLOOD PRESSURE: 77 MMHG | HEART RATE: 80 BPM | BODY MASS INDEX: 28.14 KG/M2 | TEMPERATURE: 98.3 F | HEIGHT: 69 IN | SYSTOLIC BLOOD PRESSURE: 132 MMHG

## 2023-02-15 VITALS — SYSTOLIC BLOOD PRESSURE: 124 MMHG | HEART RATE: 72 BPM | RESPIRATION RATE: 16 BRPM | DIASTOLIC BLOOD PRESSURE: 60 MMHG

## 2023-02-15 LAB
ALBUMIN SERPL ELPH-MCNC: 4.7 G/DL
ALP BLD-CCNC: 98 U/L
ALT SERPL-CCNC: 22 U/L
ANION GAP SERPL CALC-SCNC: 14 MMOL/L
AST SERPL-CCNC: 27 U/L
BASOPHILS # BLD AUTO: 0.05 K/UL
BASOPHILS NFR BLD AUTO: 0.9 %
BILIRUB SERPL-MCNC: 0.4 MG/DL
BUN SERPL-MCNC: 37 MG/DL
CALCIUM SERPL-MCNC: 10.2 MG/DL
CHLORIDE SERPL-SCNC: 102 MMOL/L
CO2 SERPL-SCNC: 24 MMOL/L
CREAT SERPL-MCNC: 1.54 MG/DL
EGFR: 42 ML/MIN/1.73M2
EOSINOPHIL # BLD AUTO: 0.46 K/UL
EOSINOPHIL NFR BLD AUTO: 8.2 %
GLUCOSE BLDC GLUCOMTR-MCNC: 272
GLUCOSE SERPL-MCNC: 188 MG/DL
HCT VFR BLD CALC: 45.4 %
HGB BLD-MCNC: 14.3 G/DL
IMM GRANULOCYTES NFR BLD AUTO: 0.7 %
LYMPHOCYTES # BLD AUTO: 1.6 K/UL
LYMPHOCYTES NFR BLD AUTO: 28.5 %
MAN DIFF?: NORMAL
MCHC RBC-ENTMCNC: 28.9 PG
MCHC RBC-ENTMCNC: 31.5 GM/DL
MCV RBC AUTO: 91.9 FL
MONOCYTES # BLD AUTO: 0.91 K/UL
MONOCYTES NFR BLD AUTO: 16.2 %
NEUTROPHILS # BLD AUTO: 2.56 K/UL
NEUTROPHILS NFR BLD AUTO: 45.5 %
PLATELET # BLD AUTO: 165 K/UL
POTASSIUM SERPL-SCNC: 4.9 MMOL/L
PROT SERPL-MCNC: 7.1 G/DL
RBC # BLD: 4.94 M/UL
RBC # FLD: 14.1 %
SODIUM SERPL-SCNC: 140 MMOL/L
WBC # FLD AUTO: 5.62 K/UL

## 2023-02-15 PROCEDURE — 82962 GLUCOSE BLOOD TEST: CPT

## 2023-02-15 PROCEDURE — 99214 OFFICE O/P EST MOD 30 MIN: CPT | Mod: 25

## 2023-02-15 RX ORDER — GABAPENTIN 100 MG/1
100 CAPSULE ORAL
Qty: 60 | Refills: 2 | Status: COMPLETED | COMMUNITY
Start: 2022-10-14 | End: 2023-02-15

## 2023-02-15 RX ORDER — QUETIAPINE FUMARATE 25 MG/1
25 TABLET ORAL
Refills: 0 | Status: COMPLETED | COMMUNITY
Start: 2023-02-15 | End: 2023-02-15

## 2023-02-15 RX ORDER — TRIAMCINOLONE ACETONIDE 1 MG/G
0.1 CREAM TOPICAL
Qty: 1 | Refills: 2 | Status: COMPLETED | COMMUNITY
Start: 2021-06-15 | End: 2023-02-15

## 2023-02-15 NOTE — HISTORY OF PRESENT ILLNESS
[Hyperlipidemia] : Hyperlipidemia [Hypertension] : Hypertension [Other: ___] : [unfilled] [Does not check] : Patient does not check blood glucose regularly [Most Recent A1C: ___] : Most recent A1C was [unfilled] [FreeTextEntry6] : pt c/o  sob and fatigue back pain parker  [Checks BP Regularly] : The patient checks ~his/her~ blood pressure regularly [de-identified] : has  been elevated

## 2023-02-15 NOTE — ASSESSMENT
[FreeTextEntry1] : dm continue jantovan chf start  entresto given samples hld continue atorvastatin 40 mg

## 2023-02-15 NOTE — PHYSICAL EXAM
[No Acute Distress] : no acute distress [PERRL] : pupils equal round and reactive to light [Normal TMs] : both tympanic membranes were normal [Supple] : supple [Clear to Auscultation] : lungs were clear to auscultation bilaterally [Regular Rhythm] : with a regular rhythm [No Edema] : there was no peripheral edema [Normal] : soft, non-tender, non-distended, no masses palpated, no HSM and normal bowel sounds [Normal Supraclavicular Nodes] : no supraclavicular lymphadenopathy [Normal Posterior Cervical Nodes] : no posterior cervical lymphadenopathy [Normal Anterior Cervical Nodes] : no anterior cervical lymphadenopathy [No Rash] : no rash [No Focal Deficits] : no focal deficits [Normal Insight/Judgement] : insight and judgment were intact

## 2023-02-18 RX ORDER — WARFARIN SODIUM 5 MG/1
5 TABLET ORAL DAILY
Refills: 0 | Status: COMPLETED | COMMUNITY
Start: 2023-02-15 | End: 2023-02-18

## 2023-02-26 ENCOUNTER — NON-APPOINTMENT (OUTPATIENT)
Age: 88
End: 2023-02-26

## 2023-02-27 ENCOUNTER — NON-APPOINTMENT (OUTPATIENT)
Age: 88
End: 2023-02-27

## 2023-03-18 ENCOUNTER — APPOINTMENT (OUTPATIENT)
Dept: OPHTHALMOLOGY | Facility: CLINIC | Age: 88
End: 2023-03-18
Payer: MEDICARE

## 2023-03-18 ENCOUNTER — NON-APPOINTMENT (OUTPATIENT)
Age: 88
End: 2023-03-18

## 2023-03-18 PROCEDURE — 92134 CPTRZ OPH DX IMG PST SGM RTA: CPT

## 2023-03-18 PROCEDURE — 92012 INTRM OPH EXAM EST PATIENT: CPT

## 2023-03-20 ENCOUNTER — APPOINTMENT (OUTPATIENT)
Dept: CARDIOLOGY | Facility: CLINIC | Age: 88
End: 2023-03-20
Payer: MEDICARE

## 2023-03-20 ENCOUNTER — NON-APPOINTMENT (OUTPATIENT)
Age: 88
End: 2023-03-20

## 2023-03-20 VITALS
HEART RATE: 70 BPM | BODY MASS INDEX: 28.14 KG/M2 | OXYGEN SATURATION: 95 % | SYSTOLIC BLOOD PRESSURE: 134 MMHG | HEIGHT: 69 IN | WEIGHT: 190 LBS | DIASTOLIC BLOOD PRESSURE: 74 MMHG

## 2023-03-20 PROCEDURE — 99214 OFFICE O/P EST MOD 30 MIN: CPT | Mod: 25

## 2023-03-20 PROCEDURE — 93000 ELECTROCARDIOGRAM COMPLETE: CPT

## 2023-03-20 NOTE — DISCUSSION/SUMMARY
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, 3V CABG 1996 at [facility], PCI 2019 and 2020, ICD [facility],  AF on Eliquis, CKD baseline GARDNER and fatigue\par \par ICM:\par nuc stress test was negative for ischemia\par c/w lasix to 60mg  \par c/w metoprolol succ \par Entresto stopped in the past due to increase in renal function and electrolyte abnormalities \par cannot tolerate coreg due to hypotension\par \par CAD:\par c/w plavix\par c/w statin and fenofibrate 160, goal LDL <70\par Entresto has been stopped in the past due increase in Cr and electrolyte abnormality\par BP high recently - increase metoprolol to 50mg \par c/w lasix - pt appears euvolemic\par \par AF:\par on Eliquis 2.5mg bid - he denies any bleeding issues\par c/w dig and metoprolol\par \par Pt will return in 4m or sooner as needed but I encouraged communication via phone or portal if necessary.\par The described plan was discussed with the pt.  All questions and concerns were addressed to the best of my knowledge.

## 2023-03-20 NOTE — HISTORY OF PRESENT ILLNESS
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, PCI 2019 and 2020, mod to sev ICM, ICD [employer] [facility] (tachyarrhythmia therapies are now turned off),  AF on coumadin, CKD\par cardiac cath 06/2019 patent graft to LAD, mod disease in SVG to OM, D1 s/p ELOY x2\par cardiac cath 06/2020 ELOY to ostial/prox SVG to OM\par Of note anginal symptoms were "feeling exhausted", GARDNER\par 2022 he was found to have lead fracture - pt elected not to have any intervention - tachyarrhythmia therapies were turned off\par \par Today pt tells me that he his breathing improved a bit after increase dose of lasix to 60mg qd.  SOB occurs when he exerts himself. He thinks his symptoms have improved but only minimally. He denies palpitations, dizziness, syncope, LE edema, PND, orthopnea.  \par \par TTE 07/2021 EF 30-35%, mild MR/TR/PI, mild AS, mild-mod AI\par TTE 06/2022 EF 30-35%, mild AS MG 11 ASTON 1.7, mild MR\par TTE 01/2023 EF 25-30%, mild to mod AS MG 18.5, ASTON 1, mod AI, mild MR/TR\par Nuc stress test 01/2022 large, severe mostly fixed defects apical, apical ant, distal ant, mid anterior, inferior walls

## 2023-04-11 ENCOUNTER — APPOINTMENT (OUTPATIENT)
Dept: ELECTROPHYSIOLOGY | Facility: CLINIC | Age: 88
End: 2023-04-11
Payer: MEDICARE

## 2023-04-11 VITALS
WEIGHT: 190 LBS | HEART RATE: 71 BPM | HEIGHT: 69 IN | BODY MASS INDEX: 28.14 KG/M2 | SYSTOLIC BLOOD PRESSURE: 140 MMHG | DIASTOLIC BLOOD PRESSURE: 68 MMHG

## 2023-04-11 DIAGNOSIS — Z95.810 PRESENCE OF AUTOMATIC (IMPLANTABLE) CARDIAC DEFIBRILLATOR: ICD-10-CM

## 2023-04-11 DIAGNOSIS — T85.9XXA UNSPECIFIED COMPLICATION OF INTERNAL PROSTHETIC DEVICE, IMPLANT AND GRAFT, INITIAL ENCOUNTER: ICD-10-CM

## 2023-04-11 PROCEDURE — 93290 INTERROG DEV EVAL ICPMS IP: CPT | Mod: 26

## 2023-04-11 PROCEDURE — 93283 PRGRMG EVAL IMPLANTABLE DFB: CPT

## 2023-04-14 PROBLEM — T85.9XXA COMPLICATION OF IMPLANTED DEVICE: Status: ACTIVE | Noted: 2023-04-14

## 2023-04-14 NOTE — PHYSICAL EXAM
[General Appearance - In No Acute Distress] : no acute distress [Heart Sounds] : normal S1 and S2 [] : no respiratory distress [Left Infraclavicular] : left infraclavicular area [Well-Healed] : well-healed [Bowel Sounds] : normal bowel sounds [Abdomen Soft] : soft

## 2023-04-14 NOTE — HISTORY OF PRESENT ILLNESS
[SOB] : dyspnea [de-identified] : Pt has known RV lead fracture since 7/27/22, ICD tachy detections are shut off.\par Pt decided against having new RV lead placement or extraction of device.  This discussion was also with his son and Dr. Oh. He is in NSR 99% and the device had never given him ICD therapy.\par \par Today he reports his symptoms are the same, GARDNER, he had recent cardiac workup including nuc stress which was negative for ischemia, repeat 2D echo with LVEF 25-30%, in past was 30-35%

## 2023-04-14 NOTE — PROCEDURE
[No] : not [NSR] : normal sinus rhythm [ICD] : Implantable cardioverter-defibrillator [Threshold Testing Performed] : Threshold testing was performed [___V @] : [unfilled] V [___ ms] : [unfilled] ms [Lead Imp:  ___ohms] : lead impedance was [unfilled] ohms [Sensing Amplitude ___mv] : sensing amplitude was [unfilled] mv [Outputs/Safety Margin] : output changed to allow for adequate safety margin [de-identified] : Medtronic [de-identified] : Kanu [de-identified] : [id number] [de-identified] : 8/21/2014 [de-identified] : AAI -DDD [de-identified] : 1.9 years longevity [de-identified] : Mode changed to AAI  60 BPM.   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  The patient has chosen at this time not to undergo lead extraction and/or revision.\par He is in NSR 99%.\par All tachy detections and therapies are off.\par Pt has 1.9 yrs battery longevity.

## 2023-04-14 NOTE — REVIEW OF SYSTEMS
[Dyspnea on exertion] : dyspnea during exertion [Negative] : Gastrointestinal [FreeTextEntry2] : fatigue

## 2023-04-14 NOTE — DISCUSSION/SUMMARY
[Routine Follow-up in 3-4 months] : routine follow-up in 3-4 months [Patient] : the patient [FreeTextEntry1] : Discussion with patient and Dr. Oh regarding RV lead failure.\par At this time we will continue to monitor.\par Pt was told that if he changes his mind at any time, we can arrange for lead extraction and new implant.\par Will f/u in device clinic in 3 months.

## 2023-04-24 ENCOUNTER — RX RENEWAL (OUTPATIENT)
Age: 88
End: 2023-04-24

## 2023-05-01 ENCOUNTER — RX RENEWAL (OUTPATIENT)
Age: 88
End: 2023-05-01

## 2023-05-01 ENCOUNTER — APPOINTMENT (OUTPATIENT)
Dept: FAMILY MEDICINE | Facility: CLINIC | Age: 88
End: 2023-05-01
Payer: MEDICARE

## 2023-05-01 VITALS — DIASTOLIC BLOOD PRESSURE: 80 MMHG | SYSTOLIC BLOOD PRESSURE: 130 MMHG | HEART RATE: 72 BPM | RESPIRATION RATE: 16 BRPM

## 2023-05-01 VITALS
HEIGHT: 69 IN | WEIGHT: 188 LBS | SYSTOLIC BLOOD PRESSURE: 130 MMHG | DIASTOLIC BLOOD PRESSURE: 50 MMHG | TEMPERATURE: 97.8 F | OXYGEN SATURATION: 94 % | HEART RATE: 66 BPM | BODY MASS INDEX: 27.85 KG/M2

## 2023-05-01 DIAGNOSIS — R09.89 OTHER SPECIFIED SYMPTOMS AND SIGNS INVOLVING THE CIRCULATORY AND RESPIRATORY SYSTEMS: ICD-10-CM

## 2023-05-01 PROCEDURE — 99214 OFFICE O/P EST MOD 30 MIN: CPT

## 2023-05-01 RX ORDER — TRAMADOL HYDROCHLORIDE 50 MG/1
50 TABLET, COATED ORAL
Qty: 50 | Refills: 0 | Status: COMPLETED | COMMUNITY
Start: 2018-11-12 | End: 2023-05-01

## 2023-05-01 NOTE — HISTORY OF PRESENT ILLNESS
[Diabetes Mellitus] : Diabetes Mellitus [Hyperlipidemia] : Hyperlipidemia [Hypertension] : Hypertension [Other: ___] : [unfilled] [Does not check] : Patient does not check blood glucose regularly [Most Recent A1C: ___] : Most recent A1C was [unfilled] [Checks BP Regularly] : The patient checks ~his/her~ blood pressure regularly [FreeTextEntry6] : pt had  screening test  glucose was  411 pt aicd  lead in RV is fractured and not sensing  [de-identified] : has  been elevated

## 2023-05-01 NOTE — ASSESSMENT
[FreeTextEntry1] : bp acceptable continue metoporlol \par chf continue lasix \par af continue eliquis dm start farxiga may help chf also check a1\par rtc 1 mo \par gout continue allopurinol

## 2023-05-01 NOTE — PHYSICAL EXAM
[No Acute Distress] : no acute distress [PERRL] : pupils equal round and reactive to light [Normal TMs] : both tympanic membranes were normal [Supple] : supple [Clear to Auscultation] : lungs were clear to auscultation bilaterally [Regular Rhythm] : with a regular rhythm [No Edema] : there was no peripheral edema [Normal] : soft, non-tender, non-distended, no masses palpated, no HSM and normal bowel sounds [Normal Supraclavicular Nodes] : no supraclavicular lymphadenopathy [Normal Posterior Cervical Nodes] : no posterior cervical lymphadenopathy [Normal Anterior Cervical Nodes] : no anterior cervical lymphadenopathy [No Rash] : no rash [No Focal Deficits] : no focal deficits [Normal Insight/Judgement] : insight and judgment were intact [de-identified] : systolic  murmur

## 2023-05-02 LAB
ALBUMIN SERPL ELPH-MCNC: 4.9 G/DL
ALP BLD-CCNC: 97 U/L
ALT SERPL-CCNC: 20 U/L
ANION GAP SERPL CALC-SCNC: 15 MMOL/L
AST SERPL-CCNC: 23 U/L
BASOPHILS # BLD AUTO: 0.06 K/UL
BASOPHILS NFR BLD AUTO: 1.1 %
BILIRUB SERPL-MCNC: 0.4 MG/DL
BUN SERPL-MCNC: 37 MG/DL
CALCIUM SERPL-MCNC: 11.1 MG/DL
CHLORIDE SERPL-SCNC: 102 MMOL/L
CHOLEST SERPL-MCNC: 140 MG/DL
CO2 SERPL-SCNC: 25 MMOL/L
CREAT SERPL-MCNC: 1.69 MG/DL
EGFR: 38 ML/MIN/1.73M2
EOSINOPHIL # BLD AUTO: 0.37 K/UL
EOSINOPHIL NFR BLD AUTO: 7 %
ESTIMATED AVERAGE GLUCOSE: 246 MG/DL
GLUCOSE SERPL-MCNC: 160 MG/DL
HBA1C MFR BLD HPLC: 10.2 %
HCT VFR BLD CALC: 45.8 %
HDLC SERPL-MCNC: 36 MG/DL
HGB BLD-MCNC: 14.3 G/DL
IMM GRANULOCYTES NFR BLD AUTO: 0.4 %
LDLC SERPL CALC-MCNC: 54 MG/DL
LYMPHOCYTES # BLD AUTO: 1.49 K/UL
LYMPHOCYTES NFR BLD AUTO: 28.2 %
MAN DIFF?: NORMAL
MCHC RBC-ENTMCNC: 29.9 PG
MCHC RBC-ENTMCNC: 31.2 GM/DL
MCV RBC AUTO: 95.6 FL
MONOCYTES # BLD AUTO: 0.79 K/UL
MONOCYTES NFR BLD AUTO: 14.9 %
NEUTROPHILS # BLD AUTO: 2.56 K/UL
NEUTROPHILS NFR BLD AUTO: 48.4 %
NONHDLC SERPL-MCNC: 104 MG/DL
PLATELET # BLD AUTO: 187 K/UL
POTASSIUM SERPL-SCNC: 5.1 MMOL/L
PROT SERPL-MCNC: 7.4 G/DL
RBC # BLD: 4.79 M/UL
RBC # FLD: 14.1 %
SODIUM SERPL-SCNC: 142 MMOL/L
T4 SERPL-MCNC: 6.2 UG/DL
TRIGL SERPL-MCNC: 247 MG/DL
TSH SERPL-ACNC: 0.87 UIU/ML
URATE SERPL-MCNC: 5.4 MG/DL
WBC # FLD AUTO: 5.29 K/UL

## 2023-05-06 ENCOUNTER — APPOINTMENT (OUTPATIENT)
Dept: OPHTHALMOLOGY | Facility: CLINIC | Age: 88
End: 2023-05-06
Payer: MEDICARE

## 2023-05-06 ENCOUNTER — NON-APPOINTMENT (OUTPATIENT)
Age: 88
End: 2023-05-06

## 2023-05-06 PROCEDURE — 92134 CPTRZ OPH DX IMG PST SGM RTA: CPT

## 2023-05-06 PROCEDURE — 67028 INJECTION EYE DRUG: CPT | Mod: RT

## 2023-05-08 ENCOUNTER — RX RENEWAL (OUTPATIENT)
Age: 88
End: 2023-05-08

## 2023-05-09 ENCOUNTER — RX RENEWAL (OUTPATIENT)
Age: 88
End: 2023-05-09

## 2023-05-14 LAB
APPEARANCE: ABNORMAL
BACTERIA: ABNORMAL /HPF
BILIRUBIN URINE: NEGATIVE
BLOOD URINE: ABNORMAL
CAST: NORMAL /LPF
COLOR: YELLOW
CREAT SPEC-SCNC: 52 MG/DL
EPITHELIAL CELLS: 1 /HPF
GLUCOSE QUALITATIVE U: 500 MG/DL
KETONES URINE: NEGATIVE MG/DL
LEUKOCYTE ESTERASE URINE: ABNORMAL
MICROALBUMIN 24H UR DL<=1MG/L-MCNC: 8.2 MG/DL
MICROALBUMIN/CREAT 24H UR-RTO: 160 MG/G
MICROSCOPIC-UA: NORMAL
NITRITE URINE: NEGATIVE
PH URINE: 6.5
PROTEIN URINE: 30 MG/DL
RED BLOOD CELLS URINE: 1 /HPF
SPECIFIC GRAVITY URINE: 1.01
SPERM-LIKE CELLS: PRESENT
UROBILINOGEN URINE: 0.2 MG/DL
WHITE BLOOD CELLS URINE: 1054 /HPF

## 2023-05-18 ENCOUNTER — APPOINTMENT (OUTPATIENT)
Dept: FAMILY MEDICINE | Facility: CLINIC | Age: 88
End: 2023-05-18
Payer: MEDICARE

## 2023-05-18 VITALS
BODY MASS INDEX: 27.85 KG/M2 | HEIGHT: 69 IN | DIASTOLIC BLOOD PRESSURE: 80 MMHG | HEART RATE: 67 BPM | TEMPERATURE: 97.6 F | SYSTOLIC BLOOD PRESSURE: 132 MMHG | OXYGEN SATURATION: 97 % | WEIGHT: 188 LBS

## 2023-05-18 DIAGNOSIS — N41.9 INFLAMMATORY DISEASE OF PROSTATE, UNSPECIFIED: ICD-10-CM

## 2023-05-18 LAB
BILIRUB UR QL STRIP: NEGATIVE
GLUCOSE UR-MCNC: NORMAL
HCG UR QL: 0.2 EU/DL
HGB UR QL STRIP.AUTO: NORMAL
KETONES UR-MCNC: NEGATIVE
LEUKOCYTE ESTERASE UR QL STRIP: NORMAL
NITRITE UR QL STRIP: NEGATIVE
PH UR STRIP: 5.5
PROT UR STRIP-MCNC: NEGATIVE
SP GR UR STRIP: 1.01

## 2023-05-18 PROCEDURE — 99214 OFFICE O/P EST MOD 30 MIN: CPT | Mod: 25

## 2023-05-18 PROCEDURE — 81003 URINALYSIS AUTO W/O SCOPE: CPT | Mod: QW

## 2023-05-18 RX ORDER — BLOOD SUGAR DIAGNOSTIC
STRIP MISCELLANEOUS
Qty: 200 | Refills: 0 | Status: ACTIVE | COMMUNITY
Start: 2017-01-05 | End: 1900-01-01

## 2023-05-18 NOTE — HISTORY OF PRESENT ILLNESS
[Diabetes Mellitus] : Diabetes Mellitus [Hyperlipidemia] : Hyperlipidemia [Hypertension] : Hypertension [Other: ___] : [unfilled] [FreeTextEntry6] : pt feeling  better with farxiga not as sob  checking glucose 1 x aday  pt c/o dysuria and frequency  [Does not check] : Patient does not check blood glucose regularly [Most Recent A1C: ___] : Most recent A1C was [unfilled] [Checks BP Regularly] : The patient checks ~his/her~ blood pressure regularly [de-identified] : has  been elevated

## 2023-05-21 RX ORDER — CIPROFLOXACIN HYDROCHLORIDE 500 MG/1
500 TABLET, FILM COATED ORAL TWICE DAILY
Qty: 20 | Refills: 0 | Status: DISCONTINUED | COMMUNITY
Start: 2023-05-18 | End: 2023-05-21

## 2023-05-22 LAB — BACTERIA UR CULT: ABNORMAL

## 2023-05-23 ENCOUNTER — RX RENEWAL (OUTPATIENT)
Age: 88
End: 2023-05-23

## 2023-06-08 ENCOUNTER — APPOINTMENT (OUTPATIENT)
Dept: OPHTHALMOLOGY | Facility: CLINIC | Age: 88
End: 2023-06-08
Payer: MEDICARE

## 2023-06-08 ENCOUNTER — NON-APPOINTMENT (OUTPATIENT)
Age: 88
End: 2023-06-08

## 2023-06-08 PROCEDURE — 92134 CPTRZ OPH DX IMG PST SGM RTA: CPT

## 2023-06-08 PROCEDURE — 67028 INJECTION EYE DRUG: CPT | Mod: RT

## 2023-06-10 NOTE — ASU PATIENT PROFILE, ADULT - NS PRO ABUSE SCREEN AFRAID ANYONE YN
[Alert] : alert [Oriented x 3] : ~L oriented x 3 [Well Nourished] : well nourished [FreeTextEntry3] : No active disease no

## 2023-07-06 ENCOUNTER — NON-APPOINTMENT (OUTPATIENT)
Age: 88
End: 2023-07-06

## 2023-07-06 ENCOUNTER — APPOINTMENT (OUTPATIENT)
Dept: OPHTHALMOLOGY | Facility: CLINIC | Age: 88
End: 2023-07-06
Payer: MEDICARE

## 2023-07-06 PROCEDURE — 92134 CPTRZ OPH DX IMG PST SGM RTA: CPT

## 2023-07-06 PROCEDURE — 92012 INTRM OPH EXAM EST PATIENT: CPT

## 2023-07-10 ENCOUNTER — RX RENEWAL (OUTPATIENT)
Age: 88
End: 2023-07-10

## 2023-07-11 ENCOUNTER — APPOINTMENT (OUTPATIENT)
Dept: ELECTROPHYSIOLOGY | Facility: CLINIC | Age: 88
End: 2023-07-11

## 2023-07-13 ENCOUNTER — RX RENEWAL (OUTPATIENT)
Age: 88
End: 2023-07-13

## 2023-07-18 ENCOUNTER — RX RENEWAL (OUTPATIENT)
Age: 88
End: 2023-07-18

## 2023-07-26 NOTE — ED PROVIDER NOTE - NSICDXFAMILYHX_GEN_ALL_CORE_FT
Pt to ED for STD treatment. Pt states his partner was seen somewhere today because she felt \"funny\", was treated for STDs but hasn't gotten all results yet. Pt came in to be treated. Pt also reports having foot pain x 4 months.
FAMILY HISTORY:  FH: CHF (congestive heart failure)  FH: myocardial infarction

## 2023-08-03 ENCOUNTER — RX RENEWAL (OUTPATIENT)
Age: 88
End: 2023-08-03

## 2023-08-07 ENCOUNTER — RX RENEWAL (OUTPATIENT)
Age: 88
End: 2023-08-07

## 2023-08-10 ENCOUNTER — NON-APPOINTMENT (OUTPATIENT)
Age: 88
End: 2023-08-10

## 2023-08-10 ENCOUNTER — APPOINTMENT (OUTPATIENT)
Dept: OPHTHALMOLOGY | Facility: CLINIC | Age: 88
End: 2023-08-10
Payer: MEDICARE

## 2023-08-10 PROCEDURE — 92014 COMPRE OPH EXAM EST PT 1/>: CPT

## 2023-08-10 PROCEDURE — 92134 CPTRZ OPH DX IMG PST SGM RTA: CPT

## 2023-08-23 ENCOUNTER — APPOINTMENT (OUTPATIENT)
Dept: CARDIOLOGY | Facility: CLINIC | Age: 88
End: 2023-08-23
Payer: MEDICARE

## 2023-08-23 VITALS
SYSTOLIC BLOOD PRESSURE: 128 MMHG | DIASTOLIC BLOOD PRESSURE: 82 MMHG | BODY MASS INDEX: 25.92 KG/M2 | HEIGHT: 69 IN | WEIGHT: 175 LBS | OXYGEN SATURATION: 98 % | HEART RATE: 66 BPM

## 2023-08-23 DIAGNOSIS — R06.09 OTHER FORMS OF DYSPNEA: ICD-10-CM

## 2023-08-23 PROCEDURE — 99214 OFFICE O/P EST MOD 30 MIN: CPT | Mod: 25

## 2023-08-23 PROCEDURE — 93000 ELECTROCARDIOGRAM COMPLETE: CPT

## 2023-08-23 NOTE — DISCUSSION/SUMMARY
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, PCI 2019 and 2020, ICD [facility],  AF on Eliquis, CKD baseline GARDNER and fatigue RV lead fracture since 07/2022, ICD tachy detections are shut off  ICM: nuc stress test was negative for ischemia c/w lasix to 60mg   c/w metoprolol succ  Entresto stopped in the past due to increase in renal function and electrolyte abnormalities  cannot tolerate coreg due to hypotension Farxiga started  CAD: c/w plavix c/w statin and fenofibrate 160, goal LDL <70 Entresto has been stopped in the past due increase in Cr and electrolyte abnormality c/w metoprolol c/w lasix - pt appears euvolemic  AF: on Eliquis 2.5mg bid - he denies any bleeding issues c/w dig and metoprolol  Pt will return in 6m or sooner as needed but I encouraged communication via phone or portal if necessary. The described plan was discussed with the pt.  All questions and concerns were addressed to the best of my knowledge.  [EKG obtained to assist in diagnosis and management of assessed problem(s)] : EKG obtained to assist in diagnosis and management of assessed problem(s)

## 2023-08-23 NOTE — HISTORY OF PRESENT ILLNESS
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  SOB occurs when he exerts himself. He denies palpitations, dizziness, syncope, LE edema, PND, orthopnea.    TTE 07/2021 EF 30-35%, mild MR/TR/PI, mild AS, mild-mod AI TTE 06/2022 EF 30-35%, mild AS MG 11 ASTON 1.7, mild MR TTE 01/2023 EF 25-30%, mild to mod AS MG 18.5, ASTON 1, mod AI, mild MR/TR Nuc stress test 01/2022 large, severe mostly fixed defects apical, apical ant, distal ant, mid anterior, inferior walls

## 2023-08-24 ENCOUNTER — NON-APPOINTMENT (OUTPATIENT)
Age: 88
End: 2023-08-24

## 2023-08-29 LAB
ALBUMIN SERPL ELPH-MCNC: 5 G/DL
ALP BLD-CCNC: 73 U/L
ALT SERPL-CCNC: 20 U/L
ANION GAP SERPL CALC-SCNC: 13 MMOL/L
AST SERPL-CCNC: 25 U/L
BILIRUB SERPL-MCNC: 0.7 MG/DL
BUN SERPL-MCNC: 38 MG/DL
CALCIUM SERPL-MCNC: 10.5 MG/DL
CHLORIDE SERPL-SCNC: 101 MMOL/L
CHOLEST SERPL-MCNC: 164 MG/DL
CO2 SERPL-SCNC: 28 MMOL/L
CREAT SERPL-MCNC: 1.73 MG/DL
EGFR: 37 ML/MIN/1.73M2
ESTIMATED AVERAGE GLUCOSE: 137 MG/DL
FOLATE SERPL-MCNC: >20 NG/ML
GLUCOSE SERPL-MCNC: 123 MG/DL
HBA1C MFR BLD HPLC: 6.4 %
HDLC SERPL-MCNC: 41 MG/DL
LDLC SERPL CALC-MCNC: 79 MG/DL
MAGNESIUM SERPL-MCNC: 2.5 MG/DL
NONHDLC SERPL-MCNC: 123 MG/DL
POTASSIUM SERPL-SCNC: 5.1 MMOL/L
PROT SERPL-MCNC: 7.6 G/DL
SODIUM SERPL-SCNC: 141 MMOL/L
TRIGL SERPL-MCNC: 272 MG/DL
TSH SERPL-ACNC: 1.2 UIU/ML
VIT B12 SERPL-MCNC: 431 PG/ML

## 2023-08-30 ENCOUNTER — APPOINTMENT (OUTPATIENT)
Dept: ELECTROPHYSIOLOGY | Facility: CLINIC | Age: 88
End: 2023-08-30
Payer: MEDICARE

## 2023-08-30 ENCOUNTER — APPOINTMENT (OUTPATIENT)
Dept: FAMILY MEDICINE | Facility: CLINIC | Age: 88
End: 2023-08-30
Payer: MEDICARE

## 2023-08-30 VITALS
BODY MASS INDEX: 26.22 KG/M2 | OXYGEN SATURATION: 97 % | HEIGHT: 69 IN | WEIGHT: 177 LBS | TEMPERATURE: 97 F | SYSTOLIC BLOOD PRESSURE: 126 MMHG | DIASTOLIC BLOOD PRESSURE: 80 MMHG | HEART RATE: 60 BPM

## 2023-08-30 VITALS — RESPIRATION RATE: 16 BRPM | DIASTOLIC BLOOD PRESSURE: 70 MMHG | HEART RATE: 72 BPM | SYSTOLIC BLOOD PRESSURE: 120 MMHG

## 2023-08-30 VITALS
OXYGEN SATURATION: 98 % | HEIGHT: 69 IN | HEART RATE: 68 BPM | WEIGHT: 178 LBS | SYSTOLIC BLOOD PRESSURE: 126 MMHG | DIASTOLIC BLOOD PRESSURE: 74 MMHG | BODY MASS INDEX: 26.36 KG/M2

## 2023-08-30 DIAGNOSIS — I50.22 CHRONIC SYSTOLIC (CONGESTIVE) HEART FAILURE: ICD-10-CM

## 2023-08-30 DIAGNOSIS — M54.9 DORSALGIA, UNSPECIFIED: ICD-10-CM

## 2023-08-30 DIAGNOSIS — T82.110A BREAKDOWN (MECHANICAL) OF CARDIAC ELECTRODE, INITIAL ENCOUNTER: ICD-10-CM

## 2023-08-30 DIAGNOSIS — G89.29 DORSALGIA, UNSPECIFIED: ICD-10-CM

## 2023-08-30 PROCEDURE — 99214 OFFICE O/P EST MOD 30 MIN: CPT

## 2023-08-30 PROCEDURE — 93289 INTERROG DEVICE EVAL HEART: CPT

## 2023-08-30 PROCEDURE — 99204 OFFICE O/P NEW MOD 45 MIN: CPT

## 2023-08-30 RX ORDER — TRAMADOL HYDROCHLORIDE 50 MG/1
50 TABLET, COATED ORAL
Qty: 40 | Refills: 0 | Status: ACTIVE | COMMUNITY
Start: 2023-08-30 | End: 1900-01-01

## 2023-08-30 NOTE — HISTORY OF PRESENT ILLNESS
[Diabetes Mellitus] : Diabetes Mellitus [Hyperlipidemia] : Hyperlipidemia [Hypertension] : Hypertension [Other: ___] : [unfilled] [FreeTextEntry6] : pt here for management of dm hld af and htn feeling better  with farxiga less sob c/o  back pain radiating down rt leg getting worse  [Does not check] : Patient does not check blood glucose regularly [Most Recent A1C: ___] : Most recent A1C was [unfilled] [Checks BP Regularly] : The patient checks ~his/her~ blood pressure regularly [de-identified] : has  been elevated

## 2023-08-30 NOTE — ASSESSMENT
[FreeTextEntry1] : chf improved  with farxiga  af continue eliquis dm well controlled continue glipizide cad continue plavix  back pain tramadol no more than 6 Tylenol a day

## 2023-08-31 ENCOUNTER — RX RENEWAL (OUTPATIENT)
Age: 88
End: 2023-08-31

## 2023-09-07 ENCOUNTER — NON-APPOINTMENT (OUTPATIENT)
Age: 88
End: 2023-09-07

## 2023-09-07 ENCOUNTER — APPOINTMENT (OUTPATIENT)
Dept: OPHTHALMOLOGY | Facility: CLINIC | Age: 88
End: 2023-09-07
Payer: MEDICARE

## 2023-09-07 PROCEDURE — 92134 CPTRZ OPH DX IMG PST SGM RTA: CPT

## 2023-09-07 PROCEDURE — 92014 COMPRE OPH EXAM EST PT 1/>: CPT

## 2023-09-08 RX ORDER — LANCETS 33 GAUGE
EACH MISCELLANEOUS
Qty: 100 | Refills: 1 | Status: ACTIVE | COMMUNITY
Start: 2019-01-10 | End: 1900-01-01

## 2023-09-08 RX ORDER — BLOOD-GLUCOSE METER
W/DEVICE EACH MISCELLANEOUS
Qty: 1 | Refills: 1 | Status: ACTIVE | COMMUNITY
Start: 2018-07-05 | End: 1900-01-01

## 2023-10-13 ENCOUNTER — RX RENEWAL (OUTPATIENT)
Age: 88
End: 2023-10-13

## 2023-10-14 ENCOUNTER — APPOINTMENT (OUTPATIENT)
Dept: OPHTHALMOLOGY | Facility: CLINIC | Age: 88
End: 2023-10-14
Payer: MEDICARE

## 2023-10-14 ENCOUNTER — NON-APPOINTMENT (OUTPATIENT)
Age: 88
End: 2023-10-14

## 2023-10-14 PROCEDURE — 67028 INJECTION EYE DRUG: CPT | Mod: RT

## 2023-10-14 PROCEDURE — 92134 CPTRZ OPH DX IMG PST SGM RTA: CPT

## 2023-10-30 ENCOUNTER — RX RENEWAL (OUTPATIENT)
Age: 88
End: 2023-10-30

## 2023-11-06 ENCOUNTER — RX RENEWAL (OUTPATIENT)
Age: 88
End: 2023-11-06

## 2023-11-13 ENCOUNTER — APPOINTMENT (OUTPATIENT)
Dept: FAMILY MEDICINE | Facility: CLINIC | Age: 88
End: 2023-11-13
Payer: MEDICARE

## 2023-11-13 VITALS — HEART RATE: 72 BPM | SYSTOLIC BLOOD PRESSURE: 112 MMHG | DIASTOLIC BLOOD PRESSURE: 76 MMHG | RESPIRATION RATE: 16 BRPM

## 2023-11-13 VITALS
SYSTOLIC BLOOD PRESSURE: 124 MMHG | BODY MASS INDEX: 26.07 KG/M2 | DIASTOLIC BLOOD PRESSURE: 76 MMHG | OXYGEN SATURATION: 97 % | HEIGHT: 69 IN | WEIGHT: 176 LBS | TEMPERATURE: 97.3 F | HEART RATE: 72 BPM

## 2023-11-13 DIAGNOSIS — M10.9 GOUT, UNSPECIFIED: ICD-10-CM

## 2023-11-13 DIAGNOSIS — E78.2 MIXED HYPERLIPIDEMIA: ICD-10-CM

## 2023-11-13 DIAGNOSIS — I50.9 HEART FAILURE, UNSPECIFIED: ICD-10-CM

## 2023-11-13 DIAGNOSIS — E11.9 TYPE 2 DIABETES MELLITUS W/OUT COMPLICATIONS: ICD-10-CM

## 2023-11-13 PROCEDURE — 99214 OFFICE O/P EST MOD 30 MIN: CPT

## 2023-11-13 RX ORDER — AMOXICILLIN 500 MG/1
500 TABLET, FILM COATED ORAL
Qty: 14 | Refills: 0 | Status: COMPLETED | COMMUNITY
Start: 2023-05-21 | End: 2023-11-13

## 2023-11-13 RX ORDER — APIXABAN 2.5 MG/1
2.5 TABLET, FILM COATED ORAL
Qty: 180 | Refills: 1 | Status: COMPLETED | COMMUNITY
Start: 2023-08-07 | End: 2023-11-13

## 2023-11-14 LAB
ALBUMIN SERPL ELPH-MCNC: 4.8 G/DL
ALP BLD-CCNC: 90 U/L
ALT SERPL-CCNC: 31 U/L
ANION GAP SERPL CALC-SCNC: 13 MMOL/L
APPEARANCE: CLEAR
AST SERPL-CCNC: 29 U/L
BACTERIA: NEGATIVE /HPF
BASOPHILS # BLD AUTO: 0.04 K/UL
BASOPHILS NFR BLD AUTO: 0.8 %
BILIRUB SERPL-MCNC: 0.4 MG/DL
BILIRUBIN URINE: NEGATIVE
BLOOD URINE: NEGATIVE
BUN SERPL-MCNC: 39 MG/DL
CALCIUM SERPL-MCNC: 10.2 MG/DL
CAST: 0 /LPF
CHLORIDE SERPL-SCNC: 105 MMOL/L
CHOLEST SERPL-MCNC: 172 MG/DL
CO2 SERPL-SCNC: 26 MMOL/L
COLOR: YELLOW
CREAT SERPL-MCNC: 1.57 MG/DL
CREAT SPEC-SCNC: 16 MG/DL
EGFR: 41 ML/MIN/1.73M2
EOSINOPHIL # BLD AUTO: 0.22 K/UL
EOSINOPHIL NFR BLD AUTO: 4.6 %
EPITHELIAL CELLS: 0 /HPF
ESTIMATED AVERAGE GLUCOSE: 166 MG/DL
GLUCOSE QUALITATIVE U: 500 MG/DL
GLUCOSE SERPL-MCNC: 249 MG/DL
HBA1C MFR BLD HPLC: 7.4 %
HCT VFR BLD CALC: 49.1 %
HDLC SERPL-MCNC: 37 MG/DL
HGB BLD-MCNC: 14.8 G/DL
IMM GRANULOCYTES NFR BLD AUTO: 0.6 %
KETONES URINE: NEGATIVE MG/DL
LDLC SERPL CALC-MCNC: 85 MG/DL
LEUKOCYTE ESTERASE URINE: NEGATIVE
LYMPHOCYTES # BLD AUTO: 1.37 K/UL
LYMPHOCYTES NFR BLD AUTO: 28.8 %
MAN DIFF?: NORMAL
MCHC RBC-ENTMCNC: 30.1 GM/DL
MCHC RBC-ENTMCNC: 30.1 PG
MCV RBC AUTO: 99.8 FL
MICROALBUMIN 24H UR DL<=1MG/L-MCNC: <1.2 MG/DL
MICROALBUMIN/CREAT 24H UR-RTO: NORMAL MG/G
MICROSCOPIC-UA: NORMAL
MONOCYTES # BLD AUTO: 0.5 K/UL
MONOCYTES NFR BLD AUTO: 10.5 %
NEUTROPHILS # BLD AUTO: 2.6 K/UL
NEUTROPHILS NFR BLD AUTO: 54.7 %
NITRITE URINE: NEGATIVE
NONHDLC SERPL-MCNC: 136 MG/DL
NT-PROBNP SERPL-MCNC: 1579 PG/ML
PH URINE: 6
PLATELET # BLD AUTO: 161 K/UL
POTASSIUM SERPL-SCNC: 4.9 MMOL/L
PROT SERPL-MCNC: 7.2 G/DL
PROTEIN URINE: NEGATIVE MG/DL
RBC # BLD: 4.92 M/UL
RBC # FLD: 13.9 %
RED BLOOD CELLS URINE: 0 /HPF
SODIUM SERPL-SCNC: 143 MMOL/L
SPECIFIC GRAVITY URINE: 1.01
T4 SERPL-MCNC: 5.2 UG/DL
TRIGL SERPL-MCNC: 305 MG/DL
TSH SERPL-ACNC: 1 UIU/ML
URATE SERPL-MCNC: 5.5 MG/DL
UROBILINOGEN URINE: 0.2 MG/DL
WBC # FLD AUTO: 4.76 K/UL
WHITE BLOOD CELLS URINE: 0 /HPF

## 2023-11-16 ENCOUNTER — APPOINTMENT (OUTPATIENT)
Dept: OPHTHALMOLOGY | Facility: CLINIC | Age: 88
End: 2023-11-16
Payer: MEDICARE

## 2023-11-16 ENCOUNTER — NON-APPOINTMENT (OUTPATIENT)
Age: 88
End: 2023-11-16

## 2023-11-16 PROCEDURE — 67028 INJECTION EYE DRUG: CPT | Mod: RT

## 2023-11-16 PROCEDURE — 92134 CPTRZ OPH DX IMG PST SGM RTA: CPT

## 2023-11-27 ENCOUNTER — RX RENEWAL (OUTPATIENT)
Age: 88
End: 2023-11-27

## 2023-12-13 PROBLEM — T82.110A AICD LEAD MALFUNCTION: Status: ACTIVE | Noted: 2023-12-13

## 2023-12-13 NOTE — HISTORY OF PRESENT ILLNESS
[FreeTextEntry1] : Mr. Allen is a pleasant 89-year-old male referred for arrhythmia management. The patient has a history of CAD/MI s/p CABG 1996 and PCI 2019/2020, s/p dual chamber ICD ([employer]. Tez, first implant 2007 and then gen change in 2014) and atrial fibrillation. Last year ICD lead was noted to have fractured. The patient elected to turn ICD therapies off at the time. He is doing well currently and denies any shortness of breath, dizziness, syncope or near syncope.

## 2023-12-13 NOTE — DISCUSSION/SUMMARY
[FreeTextEntry1] : In summary, the patient is a 89 year old male with a history of CAD/MI s/p CABG 1996 and PCI 2019/2020, s/p dual chamber ICD (St. Tez, first implant 2007 and then gen change in 2014) and atrial fibrillation. At his last office visit the patient was noted to have ICD lead fracture based device interrogation findings. After a comprehensive discussion of risks and benefits the patient elected to turn off therapies for VT/VF over lead extraction/revision. On interrogation today the lead has no pacing capture. He programmed to AAI pacing mode with 2% pacing. We again had a detailed discussion regarding the possible options - addition of new lead only vs extraction+addition of new lead vs doing nothing. Patient prefers not to do anything at this point. Will return for follow up in 6 months.

## 2023-12-15 ENCOUNTER — RX RENEWAL (OUTPATIENT)
Age: 88
End: 2023-12-15

## 2023-12-15 ENCOUNTER — APPOINTMENT (OUTPATIENT)
Dept: FAMILY MEDICINE | Facility: CLINIC | Age: 88
End: 2023-12-15
Payer: MEDICARE

## 2023-12-15 VITALS
DIASTOLIC BLOOD PRESSURE: 48 MMHG | SYSTOLIC BLOOD PRESSURE: 108 MMHG | OXYGEN SATURATION: 95 % | TEMPERATURE: 97.3 F | HEART RATE: 68 BPM | WEIGHT: 175 LBS | BODY MASS INDEX: 25.92 KG/M2 | HEIGHT: 69 IN

## 2023-12-15 DIAGNOSIS — Z01.818 ENCOUNTER FOR OTHER PREPROCEDURAL EXAMINATION: ICD-10-CM

## 2023-12-15 PROCEDURE — 99214 OFFICE O/P EST MOD 30 MIN: CPT

## 2023-12-15 NOTE — PHYSICAL EXAM
[No Acute Distress] : no acute distress [Well-Appearing] : well-appearing [Normal Oropharynx] : the oropharynx was normal [No Lymphadenopathy] : no lymphadenopathy [No Accessory Muscle Use] : no accessory muscle use [Clear to Auscultation] : lungs were clear to auscultation bilaterally [Normal Rate] : normal rate  [Regular Rhythm] : with a regular rhythm [Normal S1, S2] : normal S1 and S2 [No Edema] : there was no peripheral edema [No Rash] : no rash [Coordination Grossly Intact] : coordination grossly intact [No Focal Deficits] : no focal deficits

## 2023-12-15 NOTE — HISTORY OF PRESENT ILLNESS
[Atrial Fibrillation] : atrial fibrillation [Coronary Artery Disease] : coronary artery disease [Chronic Anticoagulation] : chronic anticoagulation [Chronic Kidney Disease] : chronic kidney disease [Diabetes] : diabetes [(Patient denies any chest pain, claudication, dyspnea on exertion, orthopnea, palpitations or syncope)] : Patient denies any chest pain, claudication, dyspnea on exertion, orthopnea, palpitations or syncope [Moderate (4-6 METs)] : Moderate (4-6 METs) [Anticoagulants: _____] : Anticoagulants: [unfilled] [Aortic Stenosis] : no aortic stenosis [Recent Myocardial Infarction] : no recent myocardial infarction [Implantable Device/Pacemaker] : no implantable device/pacemaker [Asthma] : no asthma [COPD] : no COPD [Sleep Apnea] : no sleep apnea [Smoker] : not a smoker [Family Member] : no family member with adverse anesthesia reaction/sudden death [FreeTextEntry1] : Tooth extraction [FreeTextEntry2] : 12/20/23 [FreeTextEntry3] : Dr Mariela Brannon [FreeTextEntry4] : 89yo male with PMH of HTN, HLD, AFib on Eliquis, CAD, T2DM, CKD3 and gout who presents for medical clearance.   He is feeling very well today. Patient seen by dentist and requires tooth extraction. Dentist requesting preoperative clearance.

## 2023-12-15 NOTE — REVIEW OF SYSTEMS
[Fever] : no fever [Chills] : no chills [Chest Pain] : no chest pain [Palpitations] : no palpitations [Shortness Of Breath] : no shortness of breath [Cough] : no cough [Abdominal Pain] : no abdominal pain [Nausea] : no nausea [Vomiting] : no vomiting [Frequency] : no frequency [Headache] : no headache [Dizziness] : no dizziness

## 2023-12-15 NOTE — ASSESSMENT
[Patient Optimized for Surgery] : Patient optimized for surgery [Modify anticoagulant treatment prior to procedure] : Modify anticoagulant treatment prior to procedure [Continue anti-platelet treatment as is] : Continue current anti-platelet treatment [As per surgery] : as per surgery [FreeTextEntry4] : 89yo male with PMH of HTN, HLD, AFib on Eliquis, CAD, T2DM, CKD3 and gout who presents for medical clearance. Will have dental extraction on 12/20/23 with Dr. Mariela Brannon.   - ECG from 8/23/23 reviewed, NSR with QRS widening but no change compared to prior - Recent labwork from in office examination stable no need to repeat at this time - Recommend to hold evening and AM doses of eliquis prior to surgical procedure  - Patient is an acceptable medical candidate and is medically optimized to proceed with planned procedure [FreeTextEntry5] : hold Eliquis day before surgery

## 2023-12-28 ENCOUNTER — APPOINTMENT (OUTPATIENT)
Dept: OPHTHALMOLOGY | Facility: CLINIC | Age: 88
End: 2023-12-28
Payer: MEDICARE

## 2023-12-28 ENCOUNTER — NON-APPOINTMENT (OUTPATIENT)
Age: 88
End: 2023-12-28

## 2023-12-28 PROCEDURE — 92134 CPTRZ OPH DX IMG PST SGM RTA: CPT

## 2023-12-28 PROCEDURE — 67028 INJECTION EYE DRUG: CPT | Mod: RT

## 2024-01-03 ENCOUNTER — RX RENEWAL (OUTPATIENT)
Age: 89
End: 2024-01-03

## 2024-01-05 ENCOUNTER — RX RENEWAL (OUTPATIENT)
Age: 89
End: 2024-01-05

## 2024-01-09 ENCOUNTER — RX RENEWAL (OUTPATIENT)
Age: 89
End: 2024-01-09

## 2024-01-12 ENCOUNTER — RX RENEWAL (OUTPATIENT)
Age: 89
End: 2024-01-12

## 2024-02-01 ENCOUNTER — RX RENEWAL (OUTPATIENT)
Age: 89
End: 2024-02-01

## 2024-02-03 ENCOUNTER — NON-APPOINTMENT (OUTPATIENT)
Age: 89
End: 2024-02-03

## 2024-02-03 ENCOUNTER — APPOINTMENT (OUTPATIENT)
Dept: OPHTHALMOLOGY | Facility: CLINIC | Age: 89
End: 2024-02-03
Payer: MEDICARE

## 2024-02-03 PROCEDURE — 92014 COMPRE OPH EXAM EST PT 1/>: CPT

## 2024-02-03 PROCEDURE — 92134 CPTRZ OPH DX IMG PST SGM RTA: CPT

## 2024-02-05 ENCOUNTER — RX RENEWAL (OUTPATIENT)
Age: 89
End: 2024-02-05

## 2024-02-15 ENCOUNTER — RX RENEWAL (OUTPATIENT)
Age: 89
End: 2024-02-15

## 2024-02-15 RX ORDER — TIZANIDINE 4 MG/1
4 TABLET ORAL
Qty: 60 | Refills: 1 | Status: ACTIVE | COMMUNITY
Start: 2019-03-04 | End: 1900-01-01

## 2024-02-16 ENCOUNTER — RX RENEWAL (OUTPATIENT)
Age: 89
End: 2024-02-16

## 2024-02-16 RX ORDER — BLOOD SUGAR DIAGNOSTIC
STRIP MISCELLANEOUS
Qty: 100 | Refills: 2 | Status: ACTIVE | COMMUNITY
Start: 2018-07-05 | End: 1900-01-01

## 2024-02-16 RX ORDER — FOLIC ACID 1 MG/1
1 TABLET ORAL DAILY
Qty: 90 | Refills: 2 | Status: ACTIVE | COMMUNITY
Start: 2017-11-02 | End: 1900-01-01

## 2024-02-21 ENCOUNTER — APPOINTMENT (OUTPATIENT)
Dept: ELECTROPHYSIOLOGY | Facility: CLINIC | Age: 89
End: 2024-02-21

## 2024-02-28 ENCOUNTER — APPOINTMENT (OUTPATIENT)
Dept: CARDIOLOGY | Facility: CLINIC | Age: 89
End: 2024-02-28
Payer: MEDICARE

## 2024-02-28 VITALS
HEIGHT: 69 IN | BODY MASS INDEX: 26.51 KG/M2 | OXYGEN SATURATION: 94 % | SYSTOLIC BLOOD PRESSURE: 110 MMHG | HEART RATE: 70 BPM | WEIGHT: 179 LBS | DIASTOLIC BLOOD PRESSURE: 32 MMHG

## 2024-02-28 DIAGNOSIS — I42.9 CARDIOMYOPATHY, UNSPECIFIED: ICD-10-CM

## 2024-02-28 DIAGNOSIS — I48.91 UNSPECIFIED ATRIAL FIBRILLATION: ICD-10-CM

## 2024-02-28 DIAGNOSIS — I25.10 ATHEROSCLEROTIC HEART DISEASE OF NATIVE CORONARY ARTERY W/OUT ANGINA PECTORIS: ICD-10-CM

## 2024-02-28 PROCEDURE — 99214 OFFICE O/P EST MOD 30 MIN: CPT | Mod: 25

## 2024-02-28 PROCEDURE — 93000 ELECTROCARDIOGRAM COMPLETE: CPT

## 2024-02-28 RX ORDER — NITROGLYCERIN 0.4 MG/1
0.4 TABLET SUBLINGUAL
Qty: 25 | Refills: 1 | Status: ACTIVE | COMMUNITY
Start: 2023-01-23 | End: 1900-01-01

## 2024-02-28 NOTE — DISCUSSION/SUMMARY
[EKG obtained to assist in diagnosis and management of assessed problem(s)] : EKG obtained to assist in diagnosis and management of assessed problem(s) [FreeTextEntry1] : Pt is an 89 y/o M with PMH CAD s/p MI 1973, 3V CABG 1996 at [facility], PCI 2019 and 2020, ICD [facility] Tez,  AF on Eliquis, CKD baseline D RV lead fracture since 07/2022, ICD tachy detections are shut off  ICM: nuc stress test was negative for ischemia c/w lasix to 60mg   c/w metoprolol succ  Entresto stopped in the past due to increase in renal function and electrolyte abnormalities  cannot tolerate coreg due to hypotension c/w Farxiga   CAD: c/w plavix c/w statin and fenofibrate 160, goal LDL <70 Entresto has been stopped in the past due increase in Cr and electrolyte abnormality c/w metoprolol c/w lasix - pt appears euvolemic  AF: on Eliquis 2.5mg bid - he denies any bleeding issues c/w dig and metoprolol  Pt will return in 6m or sooner as needed but I encouraged communication via phone or portal if necessary. The described plan was discussed with the pt.  All questions and concerns were addressed to the best of my knowledge.

## 2024-02-28 NOTE — PHYSICAL EXAM
[Well Developed] : well developed [Well Nourished] : well nourished [No Acute Distress] : no acute distress [Normal Venous Pressure] : normal venous pressure [No Carotid Bruit] : no carotid bruit [Normal S1, S2] : normal S1, S2 [No Murmur] : no murmur [No Rub] : no rub [No Gallop] : no gallop [Clear Lung Fields] : clear lung fields [Good Air Entry] : good air entry [No Respiratory Distress] : no respiratory distress  [Soft] : abdomen soft [Non Tender] : non-tender [No Masses/organomegaly] : no masses/organomegaly [Normal Bowel Sounds] : normal bowel sounds [Normal Gait] : normal gait [No Edema] : no edema [No Cyanosis] : no cyanosis [No Rash] : no rash [No Varicosities] : no varicosities [No Clubbing] : no clubbing [No Skin Lesions] : no skin lesions [Moves all extremities] : moves all extremities [No Focal Deficits] : no focal deficits [Normal Speech] : normal speech [Normal memory] : normal memory [Alert and Oriented] : alert and oriented [General Appearance - Well Developed] : well developed [Normal Appearance] : normal appearance [Well Groomed] : well groomed [General Appearance - Well Nourished] : well nourished [No Deformities] : no deformities [General Appearance - In No Acute Distress] : no acute distress [Normal Conjunctiva] : the conjunctiva exhibited no abnormalities [Eyelids - No Xanthelasma] : the eyelids demonstrated no xanthelasmas [Normal Oral Mucosa] : normal oral mucosa [No Oral Pallor] : no oral pallor [No Oral Cyanosis] : no oral cyanosis [Respiration, Rhythm And Depth] : normal respiratory rhythm and effort [Exaggerated Use Of Accessory Muscles For Inspiration] : no accessory muscle use [Auscultation Breath Sounds / Voice Sounds] : lungs were clear to auscultation bilaterally [Heart Rate And Rhythm] : heart rate and rhythm were normal [Heart Sounds] : normal S1 and S2 [Murmurs] : no murmurs present [Arterial Pulses Normal] : the arterial pulses were normal [Edema] : no peripheral edema present [Abdomen Soft] : soft [Abdomen Tenderness] : non-tender [Abdomen Mass (___ Cm)] : no abdominal mass palpated [Abnormal Walk] : normal gait [Gait - Sufficient For Exercise Testing] : the gait was sufficient for exercise testing [Nail Clubbing] : no clubbing of the fingernails [Cyanosis, Localized] : no localized cyanosis [Petechial Hemorrhages (___cm)] : no petechial hemorrhages [] : no ischemic changes [Oriented To Time, Place, And Person] : oriented to person, place, and time [Mood] : the mood was normal [Impaired Insight] : insight and judgment were intact [Affect] : the affect was normal [No Anxiety] : not feeling anxious [FreeTextEntry1] : no JVD or bruits

## 2024-02-28 NOTE — HISTORY OF PRESENT ILLNESS
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, 3V CABG 1996 at [facility], PCI 2019 and 2020, mod to sev ICM, ICD [facility] Tez (tachyarrhythmia therapies are now turned off),  AF on coumadin, CKD cardiac cath 06/2019 patent graft to LAD, mod disease in SVG to OM, D1 s/p ELOY x2 cardiac cath 06/2020 ELOY to ostial/prox SVG to OM Of note anginal symptoms were "feeling exhausted", GARDNER 2022 he was found to have lead fracture - pt elected not to have any intervention - tachyarrhythmia therapies were turned off  Today he tells me that he got SOB a few weeks ago after he missed his morning meds - he took SL NTG and has been feeling better.  He denies SOB, diaphoresis, palpitations, dizziness, syncope, LE edema, PND, orthopnea.    TTE 07/2021 EF 30-35%, mild MR/TR/PI, mild AS, mild-mod AI TTE 06/2022 EF 30-35%, mild AS MG 11 ASTON 1.7, mild MR TTE 01/2023 EF 25-30%, mild to mod AS MG 18.5, ASTON 1, mod AI, mild MR/TR Nuc stress test 01/2022 large, severe mostly fixed defects apical, apical ant, distal ant, mid anterior, inferior walls

## 2024-03-13 ENCOUNTER — RX RENEWAL (OUTPATIENT)
Age: 89
End: 2024-03-13

## 2024-03-13 RX ORDER — METOPROLOL SUCCINATE 100 MG/1
100 TABLET, EXTENDED RELEASE ORAL DAILY
Qty: 90 | Refills: 3 | Status: ACTIVE | COMMUNITY
Start: 2022-01-13 | End: 1900-01-01

## 2024-03-20 ENCOUNTER — RX RENEWAL (OUTPATIENT)
Age: 89
End: 2024-03-20

## 2024-03-23 ENCOUNTER — NON-APPOINTMENT (OUTPATIENT)
Age: 89
End: 2024-03-23

## 2024-03-23 ENCOUNTER — APPOINTMENT (OUTPATIENT)
Dept: OPHTHALMOLOGY | Facility: CLINIC | Age: 89
End: 2024-03-23
Payer: MEDICARE

## 2024-03-23 PROCEDURE — 67028 INJECTION EYE DRUG: CPT | Mod: RT

## 2024-03-23 PROCEDURE — 92134 CPTRZ OPH DX IMG PST SGM RTA: CPT

## 2024-03-25 RX ORDER — DAPAGLIFLOZIN 10 MG/1
10 TABLET, FILM COATED ORAL
Qty: 90 | Refills: 1 | Status: ACTIVE | COMMUNITY
Start: 2023-05-01 | End: 1900-01-01

## 2024-04-12 ENCOUNTER — RX RENEWAL (OUTPATIENT)
Age: 89
End: 2024-04-12

## 2024-04-12 RX ORDER — DIGOXIN 125 UG/1
125 TABLET ORAL
Qty: 90 | Refills: 0 | Status: ACTIVE | COMMUNITY
Start: 2022-05-02 | End: 1900-01-01

## 2024-04-20 ENCOUNTER — NON-APPOINTMENT (OUTPATIENT)
Age: 89
End: 2024-04-20

## 2024-04-20 ENCOUNTER — APPOINTMENT (OUTPATIENT)
Dept: OPHTHALMOLOGY | Facility: CLINIC | Age: 89
End: 2024-04-20
Payer: MEDICARE

## 2024-04-20 PROCEDURE — 92134 CPTRZ OPH DX IMG PST SGM RTA: CPT

## 2024-04-20 PROCEDURE — 67028 INJECTION EYE DRUG: CPT | Mod: RT

## 2024-04-25 ENCOUNTER — RX RENEWAL (OUTPATIENT)
Age: 89
End: 2024-04-25

## 2024-04-25 RX ORDER — CLOPIDOGREL BISULFATE 75 MG/1
75 TABLET, FILM COATED ORAL
Qty: 90 | Refills: 1 | Status: ACTIVE | COMMUNITY
Start: 2019-06-06 | End: 1900-01-01

## 2024-05-06 ENCOUNTER — RX RENEWAL (OUTPATIENT)
Age: 89
End: 2024-05-06

## 2024-05-08 ENCOUNTER — RX RENEWAL (OUTPATIENT)
Age: 89
End: 2024-05-08

## 2024-05-08 RX ORDER — APIXABAN 2.5 MG/1
2.5 TABLET, FILM COATED ORAL
Qty: 180 | Refills: 1 | Status: ACTIVE | COMMUNITY
Start: 2022-06-23 | End: 1900-01-01

## 2024-05-18 ENCOUNTER — NON-APPOINTMENT (OUTPATIENT)
Age: 89
End: 2024-05-18

## 2024-05-18 ENCOUNTER — APPOINTMENT (OUTPATIENT)
Dept: OPHTHALMOLOGY | Facility: CLINIC | Age: 89
End: 2024-05-18
Payer: MEDICARE

## 2024-05-18 PROCEDURE — 99213 OFFICE O/P EST LOW 20 MIN: CPT

## 2024-05-18 PROCEDURE — 92134 CPTRZ OPH DX IMG PST SGM RTA: CPT

## 2024-06-15 ENCOUNTER — NON-APPOINTMENT (OUTPATIENT)
Age: 89
End: 2024-06-15

## 2024-06-15 ENCOUNTER — APPOINTMENT (OUTPATIENT)
Dept: OPHTHALMOLOGY | Facility: CLINIC | Age: 89
End: 2024-06-15
Payer: MEDICARE

## 2024-06-15 PROCEDURE — 92134 CPTRZ OPH DX IMG PST SGM RTA: CPT

## 2024-06-15 PROCEDURE — 99213 OFFICE O/P EST LOW 20 MIN: CPT

## 2024-06-16 ENCOUNTER — RX RENEWAL (OUTPATIENT)
Age: 89
End: 2024-06-16

## 2024-06-16 RX ORDER — QUETIAPINE FUMARATE 25 MG/1
25 TABLET ORAL
Qty: 45 | Refills: 0 | Status: ACTIVE | COMMUNITY
Start: 2017-06-09 | End: 1900-01-01

## 2024-06-21 ENCOUNTER — RX RENEWAL (OUTPATIENT)
Age: 89
End: 2024-06-21

## 2024-06-21 RX ORDER — ATORVASTATIN CALCIUM 40 MG/1
40 TABLET, FILM COATED ORAL
Qty: 90 | Refills: 3 | Status: ACTIVE | COMMUNITY
Start: 2019-05-20 | End: 1900-01-01

## 2024-06-27 ENCOUNTER — RX RENEWAL (OUTPATIENT)
Age: 89
End: 2024-06-27

## 2024-07-15 ENCOUNTER — RX RENEWAL (OUTPATIENT)
Age: 89
End: 2024-07-15

## 2024-07-20 ENCOUNTER — APPOINTMENT (OUTPATIENT)
Dept: OPHTHALMOLOGY | Facility: CLINIC | Age: 89
End: 2024-07-20
Payer: MEDICARE

## 2024-07-20 ENCOUNTER — NON-APPOINTMENT (OUTPATIENT)
Age: 89
End: 2024-07-20

## 2024-07-20 PROCEDURE — 99213 OFFICE O/P EST LOW 20 MIN: CPT

## 2024-07-20 PROCEDURE — 92134 CPTRZ OPH DX IMG PST SGM RTA: CPT

## 2024-07-23 NOTE — HISTORY OF PRESENT ILLNESS
Spoke to patient, patient informed she has an appointment with the podiatrist next week. She stated its really swollen so she's going to the ER.   [FreeTextEntry1] : Evaluation of growths [de-identified] : First visit for 87-year-old white male presenting for evaluation of growths. Particular concern about a lesion on the back. No history of skin cancer.

## 2024-08-20 ENCOUNTER — APPOINTMENT (OUTPATIENT)
Dept: FAMILY MEDICINE | Facility: CLINIC | Age: 89
End: 2024-08-20
Payer: MEDICARE

## 2024-08-20 VITALS
SYSTOLIC BLOOD PRESSURE: 118 MMHG | TEMPERATURE: 97.5 F | HEART RATE: 99 BPM | OXYGEN SATURATION: 98 % | BODY MASS INDEX: 25.33 KG/M2 | HEIGHT: 69 IN | WEIGHT: 171 LBS | DIASTOLIC BLOOD PRESSURE: 70 MMHG

## 2024-08-20 VITALS — SYSTOLIC BLOOD PRESSURE: 144 MMHG | HEART RATE: 76 BPM | DIASTOLIC BLOOD PRESSURE: 70 MMHG | RESPIRATION RATE: 16 BRPM

## 2024-08-20 DIAGNOSIS — R09.89 OTHER SPECIFIED SYMPTOMS AND SIGNS INVOLVING THE CIRCULATORY AND RESPIRATORY SYSTEMS: ICD-10-CM

## 2024-08-20 DIAGNOSIS — I50.9 HEART FAILURE, UNSPECIFIED: ICD-10-CM

## 2024-08-20 DIAGNOSIS — E11.9 TYPE 2 DIABETES MELLITUS W/OUT COMPLICATIONS: ICD-10-CM

## 2024-08-20 PROCEDURE — G2211 COMPLEX E/M VISIT ADD ON: CPT

## 2024-08-20 PROCEDURE — 99214 OFFICE O/P EST MOD 30 MIN: CPT

## 2024-08-20 RX ORDER — SPIRONOLACTONE 25 MG/1
25 TABLET ORAL
Qty: 90 | Refills: 1 | Status: ACTIVE | COMMUNITY
Start: 2024-08-20 | End: 1900-01-01

## 2024-08-20 NOTE — ASSESSMENT
[FreeTextEntry1] : CHF  CONTINUE LASIX ADD SPIRONLACTONE  DM CONTINUE FARXIGA  MAY HAVE TO ADD METFORMIN  CAD  CONTINUE ASA F/U WITH CARDIOLOG HLD CONTINUE ATORVASTATIN CBC, ESR, CRP, CMP ORDERED

## 2024-08-20 NOTE — HISTORY OF PRESENT ILLNESS
[FreeTextEntry1] : PT HERE FOR MANAGMENT OF CHF CAD  [de-identified] : PT C/O CP TAKES LUIS E JANAE HELPS PAIN HAS GARDNER  1 BLOCK TIRED AFTER 1 DANCE GLUCOSE HAS BEEN HIGH CHECKS DAILY SEEING CARDIOLGY  SOON

## 2024-08-20 NOTE — PHYSICAL EXAM
[No Acute Distress] : no acute distress [PERRL] : pupils equal round and reactive to light [Normal TMs] : both tympanic membranes were normal [Supple] : supple [Clear to Auscultation] : lungs were clear to auscultation bilaterally [Regular Rhythm] : with a regular rhythm [No Edema] : there was no peripheral edema [No Rash] : no rash [No Focal Deficits] : no focal deficits [Normal Insight/Judgement] : insight and judgment were intact [de-identified] : SYSTOLIC MURMUR

## 2024-08-21 LAB — DIGOXIN SERPL-MCNC: 1.2 NG/ML

## 2024-08-24 ENCOUNTER — APPOINTMENT (OUTPATIENT)
Dept: OPHTHALMOLOGY | Facility: CLINIC | Age: 89
End: 2024-08-24
Payer: MEDICARE

## 2024-08-24 ENCOUNTER — NON-APPOINTMENT (OUTPATIENT)
Age: 89
End: 2024-08-24

## 2024-08-24 PROCEDURE — 92134 CPTRZ OPH DX IMG PST SGM RTA: CPT

## 2024-08-24 PROCEDURE — 99213 OFFICE O/P EST LOW 20 MIN: CPT

## 2024-08-29 ENCOUNTER — APPOINTMENT (OUTPATIENT)
Dept: CARDIOLOGY | Facility: CLINIC | Age: 89
End: 2024-08-29
Payer: MEDICARE

## 2024-08-29 ENCOUNTER — NON-APPOINTMENT (OUTPATIENT)
Age: 89
End: 2024-08-29

## 2024-08-29 VITALS
BODY MASS INDEX: 25.25 KG/M2 | WEIGHT: 171 LBS | OXYGEN SATURATION: 98 % | SYSTOLIC BLOOD PRESSURE: 120 MMHG | HEART RATE: 73 BPM | DIASTOLIC BLOOD PRESSURE: 70 MMHG

## 2024-08-29 DIAGNOSIS — T82.110A BREAKDOWN (MECHANICAL) OF CARDIAC ELECTRODE, INITIAL ENCOUNTER: ICD-10-CM

## 2024-08-29 DIAGNOSIS — I48.91 UNSPECIFIED ATRIAL FIBRILLATION: ICD-10-CM

## 2024-08-29 DIAGNOSIS — I42.9 CARDIOMYOPATHY, UNSPECIFIED: ICD-10-CM

## 2024-08-29 DIAGNOSIS — I25.10 ATHEROSCLEROTIC HEART DISEASE OF NATIVE CORONARY ARTERY W/OUT ANGINA PECTORIS: ICD-10-CM

## 2024-08-29 PROCEDURE — 93306 TTE W/DOPPLER COMPLETE: CPT

## 2024-08-29 PROCEDURE — 93000 ELECTROCARDIOGRAM COMPLETE: CPT

## 2024-08-29 PROCEDURE — 99214 OFFICE O/P EST MOD 30 MIN: CPT | Mod: 25

## 2024-08-29 NOTE — DISCUSSION/SUMMARY
[EKG obtained to assist in diagnosis and management of assessed problem(s)] : EKG obtained to assist in diagnosis and management of assessed problem(s) [FreeTextEntry1] : Pt is an 89 y/o M with PMH CAD s/p MI 1973, 3V CABG 1996 at [facility], PCI 2019 and 2020, HFrEF, ICD [facility] Tez, AF on Eliquis, CKD baseline D RV lead fracture since 07/2022, ICD tachy detections are shut off  ICM: nuc stress test was negative for ischemia c/w lasix 60mg   spironolactone was recently added c/w metoprolol succ 100mg qd Entresto stopped in the past due to increase in renal function and electrolyte abnormalities  cannot tolerate coreg due to hypotension c/w Farxiga   CAD: c/w plavix c/w statin and fenofibrate 160, goal LDL <70 Entresto has been stopped in the past due increase in Cr and electrolyte abnormality c/w metoprolol c/w lasix and spironolactone - pt appears euvolemic  AF: on Eliquis 2.5mg bid - he denies any bleeding issues or falls since last OV c/w dig and metoprolol for rate control  CKD: avoid nephrotoxins monitor electrolytes closely  Pt will return in 6m or sooner as needed but I encouraged communication via phone or portal if necessary. The described plan was discussed with the pt.  All questions and concerns were addressed to the best of my knowledge.

## 2024-08-29 NOTE — HISTORY OF PRESENT ILLNESS
[FreeTextEntry1] : Pt is an 89 y/o M with PMH CAD s/p MI 1973, 3V CABG 1996 at [facility], PCI 2019 and 2020, mod to sev ICM, ICD [facility] (tachyarrhythmia therapies are now turned off), AF on coumadin, CKD cardiac cath 06/2019 patent graft to LAD, mod disease in SVG to OM, D1 s/p ELOY x2 cardiac cath 06/2020 ELOY to ostial/prox SVG to OM Of note anginal symptoms were "feeling exhausted", GARDNER 2022 he was found to have lead fracture - pt elected not to have any intervention - tachyarrhythmia therapies were turned off  Today he tells me that he has been getting chest tightness for about 2 months.  He was seen by Dr Hart who started spironolactone which has helped with symptoms.  He is now feeling better - denies CP, SOB, diaphoresis, palpitations, dizziness, syncope, LE edema, PND, orthopnea.   TTE 07/2021 EF 30-35%, mild MR/TR/PI, mild AS, mild-mod AI TTE 06/2022 EF 30-35%, mild AS MG 11 ASTON 1.7, mild MR TTE 01/2023 EF 25-30%, mild to mod AS MG 18.5, ASTON 1, mod AI, mild MR/TR Nuc stress test 01/2022 large, severe mostly fixed defects apical, apical ant, distal ant, mid anterior, inferior walls

## 2024-08-30 LAB
ALBUMIN SERPL ELPH-MCNC: 5.1 G/DL
ALP BLD-CCNC: 104 U/L
ALT SERPL-CCNC: 16 U/L
ANION GAP SERPL CALC-SCNC: 15 MMOL/L
AST SERPL-CCNC: 18 U/L
BILIRUB SERPL-MCNC: 0.5 MG/DL
BUN SERPL-MCNC: 56 MG/DL
CALCIUM SERPL-MCNC: 10.2 MG/DL
CHLORIDE SERPL-SCNC: 97 MMOL/L
CHOLEST SERPL-MCNC: 180 MG/DL
CO2 SERPL-SCNC: 25 MMOL/L
CREAT SERPL-MCNC: 1.83 MG/DL
EGFR: 34 ML/MIN/1.73M2
ESTIMATED AVERAGE GLUCOSE: 223 MG/DL
GLUCOSE SERPL-MCNC: 229 MG/DL
HBA1C MFR BLD HPLC: 9.4 %
HCT VFR BLD CALC: 49.8 %
HDLC SERPL-MCNC: 38 MG/DL
HGB BLD-MCNC: 16.2 G/DL
LDLC SERPL CALC-MCNC: 100 MG/DL
MCHC RBC-ENTMCNC: 29.7 PG
MCHC RBC-ENTMCNC: 32.5 GM/DL
MCV RBC AUTO: 91.4 FL
NONHDLC SERPL-MCNC: 142 MG/DL
PLATELET # BLD AUTO: 227 K/UL
POTASSIUM SERPL-SCNC: 5.4 MMOL/L
PROT SERPL-MCNC: 7.7 G/DL
RBC # BLD: 5.45 M/UL
RBC # FLD: 14.3 %
SODIUM SERPL-SCNC: 136 MMOL/L
TRIGL SERPL-MCNC: 246 MG/DL
TSH SERPL-ACNC: 0.64 UIU/ML
WBC # FLD AUTO: 9.21 K/UL

## 2024-09-10 ENCOUNTER — RX RENEWAL (OUTPATIENT)
Age: 89
End: 2024-09-10

## 2024-09-13 ENCOUNTER — RX RENEWAL (OUTPATIENT)
Age: 89
End: 2024-09-13

## 2024-09-23 ENCOUNTER — APPOINTMENT (OUTPATIENT)
Dept: FAMILY MEDICINE | Facility: CLINIC | Age: 89
End: 2024-09-23
Payer: MEDICARE

## 2024-09-23 VITALS
OXYGEN SATURATION: 97 % | RESPIRATION RATE: 16 BRPM | WEIGHT: 176 LBS | BODY MASS INDEX: 26.07 KG/M2 | TEMPERATURE: 97.4 F | HEART RATE: 67 BPM | SYSTOLIC BLOOD PRESSURE: 130 MMHG | DIASTOLIC BLOOD PRESSURE: 58 MMHG | HEIGHT: 69 IN

## 2024-09-23 VITALS — HEART RATE: 66 BPM | RESPIRATION RATE: 16 BRPM | DIASTOLIC BLOOD PRESSURE: 70 MMHG | SYSTOLIC BLOOD PRESSURE: 100 MMHG

## 2024-09-23 DIAGNOSIS — I50.9 HEART FAILURE, UNSPECIFIED: ICD-10-CM

## 2024-09-23 DIAGNOSIS — S89.91XS UNSPECIFIED INJURY OF RIGHT LOWER LEG, SEQUELA: ICD-10-CM

## 2024-09-23 DIAGNOSIS — E11.9 TYPE 2 DIABETES MELLITUS W/OUT COMPLICATIONS: ICD-10-CM

## 2024-09-23 DIAGNOSIS — I25.10 ATHEROSCLEROTIC HEART DISEASE OF NATIVE CORONARY ARTERY W/OUT ANGINA PECTORIS: ICD-10-CM

## 2024-09-23 DIAGNOSIS — T82.110A BREAKDOWN (MECHANICAL) OF CARDIAC ELECTRODE, INITIAL ENCOUNTER: ICD-10-CM

## 2024-09-23 PROCEDURE — G0008: CPT

## 2024-09-23 PROCEDURE — 99214 OFFICE O/P EST MOD 30 MIN: CPT | Mod: 25

## 2024-09-23 PROCEDURE — 90662 IIV NO PRSV INCREASED AG IM: CPT

## 2024-09-23 NOTE — HISTORY OF PRESENT ILLNESS
[FreeTextEntry8] : pt fell 9/13  tripped over [facility] MAN HOLE COVER WAS IN PAIN BUT PAIN GOT ACUTELY WORSE TODAY PT UNABLE TO WALK   ALSO GLUCOSE HAS BEEN HIGH

## 2024-09-23 NOTE — PHYSICAL EXAM
[No Lymphadenopathy] : no lymphadenopathy [Regular Rhythm] : with a regular rhythm [Normal] : soft, non-tender, non-distended, no masses palpated, no HSM and normal bowel sounds [Normal Insight/Judgement] : insight and judgment were intact [de-identified] : APPEARING IN PAIN UNABLE TO STAND  [de-identified] : SYSTOLIC  MURMUR  [de-identified] : TENSE  SWELLOING AND ECHYMOSIS RT LATERAL THIGH

## 2024-09-23 NOTE — ASSESSMENT
[FreeTextEntry1] : LEG INJURY UNABLE TO STAND SEND TO ER PT ON ELIQUIS AND HAS   SIGNIFICAN SWELLING OF LEG        CHF CONTINUE LASIX AND DIGOXIN dm  CONTINUE FARXIGA AND AND GLIPIZIDE  HLD CONTINUE ATORVASTAIN

## 2024-10-16 ENCOUNTER — TRANSCRIPTION ENCOUNTER (OUTPATIENT)
Age: 89
End: 2024-10-16

## 2024-10-23 ENCOUNTER — RX RENEWAL (OUTPATIENT)
Age: 89
End: 2024-10-23

## 2024-10-29 ENCOUNTER — RX RENEWAL (OUTPATIENT)
Age: 89
End: 2024-10-29

## 2024-11-04 ENCOUNTER — APPOINTMENT (OUTPATIENT)
Dept: FAMILY MEDICINE | Facility: CLINIC | Age: 89
End: 2024-11-04
Payer: MEDICARE

## 2024-11-04 VITALS
WEIGHT: 165 LBS | RESPIRATION RATE: 16 BRPM | BODY MASS INDEX: 24.44 KG/M2 | HEART RATE: 85 BPM | DIASTOLIC BLOOD PRESSURE: 50 MMHG | TEMPERATURE: 97.2 F | SYSTOLIC BLOOD PRESSURE: 100 MMHG | HEIGHT: 69 IN | OXYGEN SATURATION: 97 %

## 2024-11-04 VITALS — DIASTOLIC BLOOD PRESSURE: 70 MMHG | HEART RATE: 76 BPM | SYSTOLIC BLOOD PRESSURE: 110 MMHG | RESPIRATION RATE: 16 BRPM

## 2024-11-04 DIAGNOSIS — E11.9 TYPE 2 DIABETES MELLITUS W/OUT COMPLICATIONS: ICD-10-CM

## 2024-11-04 DIAGNOSIS — I25.10 ATHEROSCLEROTIC HEART DISEASE OF NATIVE CORONARY ARTERY W/OUT ANGINA PECTORIS: ICD-10-CM

## 2024-11-04 DIAGNOSIS — I42.9 CARDIOMYOPATHY, UNSPECIFIED: ICD-10-CM

## 2024-11-04 DIAGNOSIS — I50.22 CHRONIC SYSTOLIC (CONGESTIVE) HEART FAILURE: ICD-10-CM

## 2024-11-04 PROCEDURE — 99214 OFFICE O/P EST MOD 30 MIN: CPT

## 2024-11-04 PROCEDURE — G2211 COMPLEX E/M VISIT ADD ON: CPT

## 2024-11-04 RX ORDER — FUROSEMIDE 20 MG/1
20 TABLET ORAL DAILY
Qty: 30 | Refills: 0 | Status: ACTIVE | COMMUNITY
Start: 2024-11-04

## 2024-11-04 RX ORDER — CARVEDILOL 6.25 MG/1
6.25 TABLET, FILM COATED ORAL
Refills: 0 | Status: ACTIVE | COMMUNITY
Start: 2024-11-04

## 2024-11-05 LAB
ALBUMIN SERPL ELPH-MCNC: 4.8 G/DL
ALP BLD-CCNC: 135 U/L
ALT SERPL-CCNC: 42 U/L
ANION GAP SERPL CALC-SCNC: 15 MMOL/L
APPEARANCE: CLEAR
AST SERPL-CCNC: 27 U/L
BACTERIA: NEGATIVE /HPF
BASOPHILS # BLD AUTO: 0.09 K/UL
BASOPHILS NFR BLD AUTO: 1.3 %
BILIRUB SERPL-MCNC: 0.5 MG/DL
BILIRUBIN URINE: NEGATIVE
BLOOD URINE: NEGATIVE
BUN SERPL-MCNC: 37 MG/DL
CALCIUM SERPL-MCNC: 9.6 MG/DL
CAST: 1 /LPF
CHLORIDE SERPL-SCNC: 102 MMOL/L
CHOLEST SERPL-MCNC: 116 MG/DL
CO2 SERPL-SCNC: 23 MMOL/L
COLOR: YELLOW
CREAT SERPL-MCNC: 1.35 MG/DL
CREAT SPEC-SCNC: 44 MG/DL
EGFR: 49 ML/MIN/1.73M2
EOSINOPHIL # BLD AUTO: 0.38 K/UL
EOSINOPHIL NFR BLD AUTO: 5.4 %
EPITHELIAL CELLS: 0 /HPF
ESTIMATED AVERAGE GLUCOSE: 177 MG/DL
GLUCOSE QUALITATIVE U: >=1000 MG/DL
GLUCOSE SERPL-MCNC: 111 MG/DL
HBA1C MFR BLD HPLC: 7.8 %
HCT VFR BLD CALC: 46.3 %
HDLC SERPL-MCNC: 36 MG/DL
HGB BLD-MCNC: 14.3 G/DL
IMM GRANULOCYTES NFR BLD AUTO: 0.4 %
KETONES URINE: NEGATIVE MG/DL
LDLC SERPL CALC-MCNC: 51 MG/DL
LEUKOCYTE ESTERASE URINE: NEGATIVE
LYMPHOCYTES # BLD AUTO: 2.1 K/UL
LYMPHOCYTES NFR BLD AUTO: 30 %
MAN DIFF?: NORMAL
MCHC RBC-ENTMCNC: 29.1 PG
MCHC RBC-ENTMCNC: 30.9 G/DL
MCV RBC AUTO: 94.3 FL
MICROALBUMIN 24H UR DL<=1MG/L-MCNC: <1.2 MG/DL
MICROALBUMIN/CREAT 24H UR-RTO: NORMAL MG/G
MICROSCOPIC-UA: NORMAL
MONOCYTES # BLD AUTO: 0.85 K/UL
MONOCYTES NFR BLD AUTO: 12.1 %
NEUTROPHILS # BLD AUTO: 3.56 K/UL
NEUTROPHILS NFR BLD AUTO: 50.8 %
NITRITE URINE: NEGATIVE
NONHDLC SERPL-MCNC: 81 MG/DL
PH URINE: 6
PLATELET # BLD AUTO: 223 K/UL
POTASSIUM SERPL-SCNC: 5.8 MMOL/L
PROT SERPL-MCNC: 7.1 G/DL
PROTEIN URINE: NEGATIVE MG/DL
RBC # BLD: 4.91 M/UL
RBC # FLD: 14.5 %
RED BLOOD CELLS URINE: 0 /HPF
SODIUM SERPL-SCNC: 140 MMOL/L
SPECIFIC GRAVITY URINE: 1.02
T4 SERPL-MCNC: 6 UG/DL
TRIGL SERPL-MCNC: 175 MG/DL
TSH SERPL-ACNC: 0.64 UIU/ML
URATE SERPL-MCNC: 4.5 MG/DL
UROBILINOGEN URINE: 0.2 MG/DL
WBC # FLD AUTO: 7.01 K/UL
WHITE BLOOD CELLS URINE: 0 /HPF

## 2024-11-14 ENCOUNTER — RX RENEWAL (OUTPATIENT)
Age: 89
End: 2024-11-14

## 2024-11-16 ENCOUNTER — RX RENEWAL (OUTPATIENT)
Age: 89
End: 2024-11-16

## 2024-12-02 ENCOUNTER — APPOINTMENT (OUTPATIENT)
Dept: FAMILY MEDICINE | Facility: CLINIC | Age: 88
End: 2024-12-02
Payer: MEDICARE

## 2024-12-02 VITALS
TEMPERATURE: 97.7 F | WEIGHT: 175.25 LBS | DIASTOLIC BLOOD PRESSURE: 40 MMHG | OXYGEN SATURATION: 94 % | HEIGHT: 69 IN | SYSTOLIC BLOOD PRESSURE: 108 MMHG | BODY MASS INDEX: 25.96 KG/M2 | HEART RATE: 63 BPM

## 2024-12-02 VITALS — DIASTOLIC BLOOD PRESSURE: 70 MMHG | RESPIRATION RATE: 16 BRPM | SYSTOLIC BLOOD PRESSURE: 110 MMHG | HEART RATE: 66 BPM

## 2024-12-02 DIAGNOSIS — I48.91 UNSPECIFIED ATRIAL FIBRILLATION: ICD-10-CM

## 2024-12-02 DIAGNOSIS — M10.9 GOUT, UNSPECIFIED: ICD-10-CM

## 2024-12-02 DIAGNOSIS — T82.110A BREAKDOWN (MECHANICAL) OF CARDIAC ELECTRODE, INITIAL ENCOUNTER: ICD-10-CM

## 2024-12-02 DIAGNOSIS — E11.9 TYPE 2 DIABETES MELLITUS W/OUT COMPLICATIONS: ICD-10-CM

## 2024-12-02 DIAGNOSIS — I25.10 ATHEROSCLEROTIC HEART DISEASE OF NATIVE CORONARY ARTERY W/OUT ANGINA PECTORIS: ICD-10-CM

## 2024-12-02 DIAGNOSIS — I50.9 HEART FAILURE, UNSPECIFIED: ICD-10-CM

## 2024-12-02 DIAGNOSIS — F32.A DEPRESSION, UNSPECIFIED: ICD-10-CM

## 2024-12-02 DIAGNOSIS — I50.22 CHRONIC SYSTOLIC (CONGESTIVE) HEART FAILURE: ICD-10-CM

## 2024-12-02 DIAGNOSIS — I42.9 CARDIOMYOPATHY, UNSPECIFIED: ICD-10-CM

## 2024-12-02 PROCEDURE — 99497 ADVNCD CARE PLAN 30 MIN: CPT

## 2024-12-02 PROCEDURE — G0439: CPT

## 2024-12-09 ENCOUNTER — RX RENEWAL (OUTPATIENT)
Age: 88
End: 2024-12-09

## 2024-12-10 ENCOUNTER — TRANSCRIPTION ENCOUNTER (OUTPATIENT)
Age: 88
End: 2024-12-10

## 2025-01-08 ENCOUNTER — NON-APPOINTMENT (OUTPATIENT)
Age: 89
End: 2025-01-08

## 2025-01-27 ENCOUNTER — TRANSCRIPTION ENCOUNTER (OUTPATIENT)
Age: 89
End: 2025-01-27

## 2025-02-03 ENCOUNTER — APPOINTMENT (OUTPATIENT)
Dept: FAMILY MEDICINE | Facility: CLINIC | Age: 89
End: 2025-02-03
Payer: MEDICARE

## 2025-02-03 VITALS
HEART RATE: 67 BPM | OXYGEN SATURATION: 96 % | BODY MASS INDEX: 25.18 KG/M2 | WEIGHT: 170 LBS | HEIGHT: 69 IN | SYSTOLIC BLOOD PRESSURE: 92 MMHG | TEMPERATURE: 98.1 F | DIASTOLIC BLOOD PRESSURE: 62 MMHG

## 2025-02-03 VITALS — RESPIRATION RATE: 16 BRPM | DIASTOLIC BLOOD PRESSURE: 70 MMHG | HEART RATE: 72 BPM | SYSTOLIC BLOOD PRESSURE: 100 MMHG

## 2025-02-03 DIAGNOSIS — I48.91 UNSPECIFIED ATRIAL FIBRILLATION: ICD-10-CM

## 2025-02-03 DIAGNOSIS — I50.22 CHRONIC SYSTOLIC (CONGESTIVE) HEART FAILURE: ICD-10-CM

## 2025-02-03 DIAGNOSIS — T85.9XXA UNSPECIFIED COMPLICATION OF INTERNAL PROSTHETIC DEVICE, IMPLANT AND GRAFT, INITIAL ENCOUNTER: ICD-10-CM

## 2025-02-03 DIAGNOSIS — H91.90 UNSPECIFIED HEARING LOSS, UNSPECIFIED EAR: ICD-10-CM

## 2025-02-03 DIAGNOSIS — Z95.810 PRESENCE OF AUTOMATIC (IMPLANTABLE) CARDIAC DEFIBRILLATOR: ICD-10-CM

## 2025-02-03 DIAGNOSIS — E11.9 TYPE 2 DIABETES MELLITUS W/OUT COMPLICATIONS: ICD-10-CM

## 2025-02-03 DIAGNOSIS — G89.29 DORSALGIA, UNSPECIFIED: ICD-10-CM

## 2025-02-03 DIAGNOSIS — I42.9 CARDIOMYOPATHY, UNSPECIFIED: ICD-10-CM

## 2025-02-03 DIAGNOSIS — F32.A DEPRESSION, UNSPECIFIED: ICD-10-CM

## 2025-02-03 DIAGNOSIS — I50.9 HEART FAILURE, UNSPECIFIED: ICD-10-CM

## 2025-02-03 DIAGNOSIS — M54.9 DORSALGIA, UNSPECIFIED: ICD-10-CM

## 2025-02-03 DIAGNOSIS — I25.10 ATHEROSCLEROTIC HEART DISEASE OF NATIVE CORONARY ARTERY W/OUT ANGINA PECTORIS: ICD-10-CM

## 2025-02-03 DIAGNOSIS — A04.72 ENTEROCOLITIS DUE TO CLOSTRIDIUM DIFFICILE, NOT SPECIFIED AS RECURRENT: ICD-10-CM

## 2025-02-03 DIAGNOSIS — K08.9 DISORDER OF TEETH AND SUPPORTING STRUCTURES, UNSPECIFIED: ICD-10-CM

## 2025-02-03 DIAGNOSIS — U07.1 COVID-19: ICD-10-CM

## 2025-02-03 PROCEDURE — G2211 COMPLEX E/M VISIT ADD ON: CPT

## 2025-02-03 PROCEDURE — 99214 OFFICE O/P EST MOD 30 MIN: CPT

## 2025-02-04 LAB
ALBUMIN SERPL ELPH-MCNC: 4.5 G/DL
ALP BLD-CCNC: 114 U/L
ALT SERPL-CCNC: 46 U/L
ANION GAP SERPL CALC-SCNC: 13 MMOL/L
APPEARANCE: CLEAR
AST SERPL-CCNC: 30 U/L
BACTERIA: NEGATIVE /HPF
BASOPHILS # BLD AUTO: 0.04 K/UL
BASOPHILS NFR BLD AUTO: 0.7 %
BILIRUB SERPL-MCNC: 0.6 MG/DL
BILIRUBIN URINE: NEGATIVE
BLOOD URINE: NEGATIVE
BUN SERPL-MCNC: 41 MG/DL
CALCIUM SERPL-MCNC: 10.4 MG/DL
CAST: 0 /LPF
CHLORIDE SERPL-SCNC: 100 MMOL/L
CHOLEST SERPL-MCNC: 133 MG/DL
CO2 SERPL-SCNC: 26 MMOL/L
COLOR: YELLOW
CREAT SERPL-MCNC: 1.55 MG/DL
CREAT SPEC-SCNC: 34 MG/DL
DIGOXIN SERPL-MCNC: 1.3 NG/ML
EGFR: 41 ML/MIN/1.73M2
EOSINOPHIL # BLD AUTO: 0.1 K/UL
EOSINOPHIL NFR BLD AUTO: 1.9 %
EPITHELIAL CELLS: 0 /HPF
ESTIMATED AVERAGE GLUCOSE: 183 MG/DL
GLUCOSE QUALITATIVE U: >=1000 MG/DL
GLUCOSE SERPL-MCNC: 194 MG/DL
HBA1C MFR BLD HPLC: 8 %
HCT VFR BLD CALC: 46.3 %
HDLC SERPL-MCNC: 52 MG/DL
HGB BLD-MCNC: 14.6 G/DL
IMM GRANULOCYTES NFR BLD AUTO: 0.4 %
KETONES URINE: NEGATIVE MG/DL
LDLC SERPL CALC-MCNC: 61 MG/DL
LEUKOCYTE ESTERASE URINE: NEGATIVE
LYMPHOCYTES # BLD AUTO: 1.49 K/UL
LYMPHOCYTES NFR BLD AUTO: 27.7 %
MAN DIFF?: NORMAL
MCHC RBC-ENTMCNC: 28.9 PG
MCHC RBC-ENTMCNC: 31.5 G/DL
MCV RBC AUTO: 91.5 FL
MICROALBUMIN 24H UR DL<=1MG/L-MCNC: <1.2 MG/DL
MICROALBUMIN/CREAT 24H UR-RTO: NORMAL MG/G
MICROSCOPIC-UA: NORMAL
MONOCYTES # BLD AUTO: 0.69 K/UL
MONOCYTES NFR BLD AUTO: 12.8 %
NEUTROPHILS # BLD AUTO: 3.04 K/UL
NEUTROPHILS NFR BLD AUTO: 56.5 %
NITRITE URINE: NEGATIVE
NONHDLC SERPL-MCNC: 81 MG/DL
PH URINE: 6
PLATELET # BLD AUTO: 162 K/UL
POTASSIUM SERPL-SCNC: 5.3 MMOL/L
PROT SERPL-MCNC: 6.8 G/DL
PROTEIN URINE: NEGATIVE MG/DL
RBC # BLD: 5.06 M/UL
RBC # FLD: 14.9 %
RED BLOOD CELLS URINE: 0 /HPF
SODIUM SERPL-SCNC: 139 MMOL/L
SPECIFIC GRAVITY URINE: 1.01
T4 SERPL-MCNC: 6 UG/DL
TRIGL SERPL-MCNC: 105 MG/DL
TSH SERPL-ACNC: 0.99 UIU/ML
URATE SERPL-MCNC: 5.3 MG/DL
UROBILINOGEN URINE: 0.2 MG/DL
WBC # FLD AUTO: 5.38 K/UL
WHITE BLOOD CELLS URINE: 0 /HPF

## 2025-02-12 ENCOUNTER — APPOINTMENT (OUTPATIENT)
Dept: FAMILY MEDICINE | Facility: CLINIC | Age: 89
End: 2025-02-12
Payer: MEDICARE

## 2025-02-12 DIAGNOSIS — M54.2 CERVICALGIA: ICD-10-CM

## 2025-02-12 PROCEDURE — 99213 OFFICE O/P EST LOW 20 MIN: CPT | Mod: 2W

## 2025-02-15 ENCOUNTER — RX RENEWAL (OUTPATIENT)
Age: 89
End: 2025-02-15

## 2025-02-20 ENCOUNTER — RX RENEWAL (OUTPATIENT)
Age: 89
End: 2025-02-20

## 2025-02-22 NOTE — DIETITIAN INITIAL EVALUATION ADULT. - PERTINENT LABORATORY DATA
09-10 Na136 mmol/L Glu 99 mg/dL K+ 5.2 mmol/L Cr  1.44 mg/dL<H> BUN 39.5 mg/dL<H> Phos n/a   Alb n/a   PAB n/a Spontaneous, unlabored and symmetrical

## 2025-02-24 ENCOUNTER — RX RENEWAL (OUTPATIENT)
Age: 89
End: 2025-02-24

## 2025-03-04 ENCOUNTER — NON-APPOINTMENT (OUTPATIENT)
Age: 89
End: 2025-03-04

## 2025-03-05 ENCOUNTER — NON-APPOINTMENT (OUTPATIENT)
Age: 89
End: 2025-03-05

## 2025-03-05 ENCOUNTER — APPOINTMENT (OUTPATIENT)
Dept: CARDIOLOGY | Facility: CLINIC | Age: 89
End: 2025-03-05
Payer: MEDICARE

## 2025-03-05 VITALS
OXYGEN SATURATION: 98 % | WEIGHT: 173 LBS | DIASTOLIC BLOOD PRESSURE: 68 MMHG | HEART RATE: 76 BPM | BODY MASS INDEX: 25.62 KG/M2 | SYSTOLIC BLOOD PRESSURE: 110 MMHG | HEIGHT: 69 IN

## 2025-03-05 DIAGNOSIS — I42.9 CARDIOMYOPATHY, UNSPECIFIED: ICD-10-CM

## 2025-03-05 DIAGNOSIS — I25.10 ATHEROSCLEROTIC HEART DISEASE OF NATIVE CORONARY ARTERY W/OUT ANGINA PECTORIS: ICD-10-CM

## 2025-03-05 DIAGNOSIS — I48.91 UNSPECIFIED ATRIAL FIBRILLATION: ICD-10-CM

## 2025-03-05 PROCEDURE — 99214 OFFICE O/P EST MOD 30 MIN: CPT | Mod: 25

## 2025-03-05 PROCEDURE — 93000 ELECTROCARDIOGRAM COMPLETE: CPT

## 2025-03-05 RX ORDER — ISOSORBIDE MONONITRATE 30 MG/1
30 TABLET, EXTENDED RELEASE ORAL
Qty: 90 | Refills: 3 | Status: ACTIVE | COMMUNITY
Start: 2025-03-05 | End: 1900-01-01

## 2025-03-11 ENCOUNTER — RX RENEWAL (OUTPATIENT)
Age: 89
End: 2025-03-11

## 2025-03-12 ENCOUNTER — TRANSCRIPTION ENCOUNTER (OUTPATIENT)
Age: 89
End: 2025-03-12

## 2025-03-17 ENCOUNTER — RX RENEWAL (OUTPATIENT)
Age: 89
End: 2025-03-17

## 2025-03-19 ENCOUNTER — TRANSCRIPTION ENCOUNTER (OUTPATIENT)
Age: 89
End: 2025-03-19

## 2025-03-28 ENCOUNTER — RX RENEWAL (OUTPATIENT)
Age: 89
End: 2025-03-28

## 2025-04-07 ENCOUNTER — APPOINTMENT (OUTPATIENT)
Dept: FAMILY MEDICINE | Facility: CLINIC | Age: 89
End: 2025-04-07
Payer: MEDICARE

## 2025-04-07 VITALS
TEMPERATURE: 97.2 F | HEART RATE: 54 BPM | WEIGHT: 172 LBS | SYSTOLIC BLOOD PRESSURE: 116 MMHG | BODY MASS INDEX: 25.48 KG/M2 | RESPIRATION RATE: 16 BRPM | HEIGHT: 69 IN | OXYGEN SATURATION: 95 % | DIASTOLIC BLOOD PRESSURE: 70 MMHG

## 2025-04-07 VITALS — RESPIRATION RATE: 16 BRPM | HEART RATE: 60 BPM | SYSTOLIC BLOOD PRESSURE: 118 MMHG | DIASTOLIC BLOOD PRESSURE: 70 MMHG

## 2025-04-07 DIAGNOSIS — R09.89 OTHER SPECIFIED SYMPTOMS AND SIGNS INVOLVING THE CIRCULATORY AND RESPIRATORY SYSTEMS: ICD-10-CM

## 2025-04-07 DIAGNOSIS — I25.10 ATHEROSCLEROTIC HEART DISEASE OF NATIVE CORONARY ARTERY W/OUT ANGINA PECTORIS: ICD-10-CM

## 2025-04-07 DIAGNOSIS — E78.2 MIXED HYPERLIPIDEMIA: ICD-10-CM

## 2025-04-07 DIAGNOSIS — E11.9 TYPE 2 DIABETES MELLITUS W/OUT COMPLICATIONS: ICD-10-CM

## 2025-04-07 DIAGNOSIS — I48.91 UNSPECIFIED ATRIAL FIBRILLATION: ICD-10-CM

## 2025-04-07 DIAGNOSIS — I50.9 HEART FAILURE, UNSPECIFIED: ICD-10-CM

## 2025-04-07 PROCEDURE — 99214 OFFICE O/P EST MOD 30 MIN: CPT

## 2025-04-07 PROCEDURE — G2211 COMPLEX E/M VISIT ADD ON: CPT

## 2025-05-06 ENCOUNTER — TRANSCRIPTION ENCOUNTER (OUTPATIENT)
Age: 89
End: 2025-05-06

## 2025-05-07 ENCOUNTER — NON-APPOINTMENT (OUTPATIENT)
Age: 89
End: 2025-05-07

## 2025-05-13 ENCOUNTER — APPOINTMENT (OUTPATIENT)
Dept: ELECTROPHYSIOLOGY | Facility: CLINIC | Age: 89
End: 2025-05-13

## 2025-07-01 NOTE — ED PROVIDER NOTE - WET READ LAUNCH FT
07/01/25 1324   ITP   Date of Plan 07/01/25   Primary Diagnosis Code Suicidal ideation R45.851   Plan of Care   Long Term Goal (LTG) Stated in patient/guardian terms \"to work on myself\"   Short Term Goal 1   Short Term Goal 1 Client will learn and demonstrate improved bourndaries   STG Goal 1 Status: Patient Appears to be  Goal has been met, formulating new goal   Short Term Goal 2   Short Term Goal 2 Client will learn and demonstrate effective coping skills   STG Goal 2 Status: Patient Appears to be  Goal has been met, formulating new goal        There are no Wet Read(s) to document.

## 2025-07-14 ENCOUNTER — APPOINTMENT (OUTPATIENT)
Dept: FAMILY MEDICINE | Facility: CLINIC | Age: 89
End: 2025-07-14

## 2025-09-03 ENCOUNTER — APPOINTMENT (OUTPATIENT)
Dept: CARDIOLOGY | Facility: CLINIC | Age: 89
End: 2025-09-03